# Patient Record
Sex: FEMALE | Race: WHITE | NOT HISPANIC OR LATINO | Employment: OTHER | URBAN - METROPOLITAN AREA
[De-identification: names, ages, dates, MRNs, and addresses within clinical notes are randomized per-mention and may not be internally consistent; named-entity substitution may affect disease eponyms.]

---

## 2017-02-02 ENCOUNTER — GENERIC CONVERSION - ENCOUNTER (OUTPATIENT)
Dept: OTHER | Facility: OTHER | Age: 77
End: 2017-02-02

## 2017-02-02 ENCOUNTER — TRANSCRIBE ORDERS (OUTPATIENT)
Dept: ADMINISTRATIVE | Facility: HOSPITAL | Age: 77
End: 2017-02-02

## 2017-02-02 DIAGNOSIS — R19.7 DIARRHEA, UNSPECIFIED TYPE: ICD-10-CM

## 2017-02-02 DIAGNOSIS — R10.13 ABDOMINAL PAIN, EPIGASTRIC: Primary | ICD-10-CM

## 2017-02-07 ENCOUNTER — TRANSCRIBE ORDERS (OUTPATIENT)
Dept: ADMINISTRATIVE | Facility: HOSPITAL | Age: 77
End: 2017-02-07

## 2017-02-07 ENCOUNTER — APPOINTMENT (OUTPATIENT)
Dept: LAB | Facility: HOSPITAL | Age: 77
End: 2017-02-07
Attending: INTERNAL MEDICINE
Payer: MEDICARE

## 2017-02-07 ENCOUNTER — APPOINTMENT (OUTPATIENT)
Dept: LAB | Facility: CLINIC | Age: 77
End: 2017-02-07
Payer: MEDICARE

## 2017-02-07 DIAGNOSIS — R10.32 ABDOMINAL PAIN, LEFT LOWER QUADRANT: Primary | ICD-10-CM

## 2017-02-07 DIAGNOSIS — R10.13 ABDOMINAL PAIN, EPIGASTRIC: ICD-10-CM

## 2017-02-07 DIAGNOSIS — R10.32 ABDOMINAL PAIN, LEFT LOWER QUADRANT: ICD-10-CM

## 2017-02-07 LAB
ALBUMIN SERPL BCP-MCNC: 3.6 G/DL (ref 3.5–5)
ALP SERPL-CCNC: 95 U/L (ref 46–116)
ALT SERPL W P-5'-P-CCNC: 31 U/L (ref 12–78)
AMYLASE SERPL-CCNC: 42 IU/L (ref 25–115)
ANION GAP SERPL CALCULATED.3IONS-SCNC: 10 MMOL/L (ref 4–13)
AST SERPL W P-5'-P-CCNC: 22 U/L (ref 5–45)
BILIRUB SERPL-MCNC: 0.6 MG/DL (ref 0.2–1)
BUN SERPL-MCNC: 13 MG/DL (ref 5–25)
CALCIUM SERPL-MCNC: 8.9 MG/DL (ref 8.3–10.1)
CHLORIDE SERPL-SCNC: 103 MMOL/L (ref 100–108)
CO2 SERPL-SCNC: 28 MMOL/L (ref 21–32)
CREAT SERPL-MCNC: 0.66 MG/DL (ref 0.6–1.3)
ERYTHROCYTE [DISTWIDTH] IN BLOOD BY AUTOMATED COUNT: 12.9 % (ref 11.6–15.1)
GFR SERPL CREATININE-BSD FRML MDRD: >60 ML/MIN/1.73SQ M
GLUCOSE SERPL-MCNC: 123 MG/DL (ref 65–140)
HCT VFR BLD AUTO: 40.2 % (ref 37–47)
HGB BLD-MCNC: 13.5 G/DL (ref 12–16)
LIPASE SERPL-CCNC: 183 U/L (ref 73–393)
MCH RBC QN AUTO: 29.8 PG (ref 27–31)
MCHC RBC AUTO-ENTMCNC: 33.7 G/DL (ref 31.4–37.4)
MCV RBC AUTO: 89 FL (ref 82–98)
PLATELET # BLD AUTO: 212 THOUSANDS/UL (ref 130–400)
PMV BLD AUTO: 9.5 FL (ref 8.9–12.7)
POTASSIUM SERPL-SCNC: 3.7 MMOL/L (ref 3.5–5.3)
PROT SERPL-MCNC: 7.2 G/DL (ref 6.4–8.2)
RBC # BLD AUTO: 4.54 MILLION/UL (ref 4.2–5.4)
SODIUM SERPL-SCNC: 141 MMOL/L (ref 136–145)
WBC # BLD AUTO: 7.6 THOUSAND/UL (ref 4.8–10.8)

## 2017-02-07 PROCEDURE — 85027 COMPLETE CBC AUTOMATED: CPT

## 2017-02-07 PROCEDURE — 80053 COMPREHEN METABOLIC PANEL: CPT | Performed by: INTERNAL MEDICINE

## 2017-02-07 PROCEDURE — 82150 ASSAY OF AMYLASE: CPT

## 2017-02-07 PROCEDURE — 83690 ASSAY OF LIPASE: CPT

## 2017-02-07 PROCEDURE — 36415 COLL VENOUS BLD VENIPUNCTURE: CPT

## 2017-02-13 ENCOUNTER — HOSPITAL ENCOUNTER (OUTPATIENT)
Dept: RADIOLOGY | Facility: HOSPITAL | Age: 77
Discharge: HOME | End: 2017-02-13
Attending: INTERNAL MEDICINE
Payer: MEDICARE

## 2017-02-13 DIAGNOSIS — R19.7 DIARRHEA, UNSPECIFIED TYPE: ICD-10-CM

## 2017-02-13 DIAGNOSIS — R10.13 ABDOMINAL PAIN, EPIGASTRIC: ICD-10-CM

## 2017-02-13 PROCEDURE — 74176 CT ABD & PELVIS W/O CONTRAST: CPT

## 2017-02-15 ENCOUNTER — ALLSCRIPTS OFFICE VISIT (OUTPATIENT)
Dept: OTHER | Facility: OTHER | Age: 77
End: 2017-02-15

## 2017-02-20 ENCOUNTER — ANESTHESIA EVENT (OUTPATIENT)
Dept: GASTROENTEROLOGY | Facility: AMBULARY SURGERY CENTER | Age: 77
End: 2017-02-20
Payer: MEDICARE

## 2017-02-20 RX ORDER — AMITRIPTYLINE HYDROCHLORIDE 25 MG/1
25 TABLET, FILM COATED ORAL
COMMUNITY
End: 2017-10-09

## 2017-02-21 ENCOUNTER — HOSPITAL ENCOUNTER (OUTPATIENT)
Facility: AMBULARY SURGERY CENTER | Age: 77
Setting detail: OUTPATIENT SURGERY
Discharge: HOME | End: 2017-02-21
Attending: INTERNAL MEDICINE | Admitting: INTERNAL MEDICINE
Payer: MEDICARE

## 2017-02-21 ENCOUNTER — ANESTHESIA (OUTPATIENT)
Dept: GASTROENTEROLOGY | Facility: AMBULARY SURGERY CENTER | Age: 77
End: 2017-02-21
Payer: MEDICARE

## 2017-02-21 VITALS
OXYGEN SATURATION: 100 % | HEART RATE: 69 BPM | TEMPERATURE: 98.7 F | SYSTOLIC BLOOD PRESSURE: 179 MMHG | RESPIRATION RATE: 18 BRPM | DIASTOLIC BLOOD PRESSURE: 77 MMHG

## 2017-02-21 DIAGNOSIS — R19.4 CHANGE IN BOWEL HABITS: ICD-10-CM

## 2017-02-21 DIAGNOSIS — Z86.010 HISTORY OF COLONIC POLYPS: ICD-10-CM

## 2017-02-21 DIAGNOSIS — R10.13 EPIGASTRIC PAIN: ICD-10-CM

## 2017-02-21 PROCEDURE — 88305 TISSUE EXAM BY PATHOLOGIST: CPT | Performed by: INTERNAL MEDICINE

## 2017-02-21 PROCEDURE — 88342 IMHCHEM/IMCYTCHM 1ST ANTB: CPT | Performed by: INTERNAL MEDICINE

## 2017-02-21 RX ORDER — FENTANYL CITRATE 50 UG/ML
INJECTION, SOLUTION INTRAMUSCULAR; INTRAVENOUS AS NEEDED
Status: DISCONTINUED | OUTPATIENT
Start: 2017-02-21 | End: 2017-02-21 | Stop reason: SURG

## 2017-02-21 RX ORDER — LABETALOL HYDROCHLORIDE 5 MG/ML
INJECTION, SOLUTION INTRAVENOUS AS NEEDED
Status: DISCONTINUED | OUTPATIENT
Start: 2017-02-21 | End: 2017-02-21 | Stop reason: SURG

## 2017-02-21 RX ORDER — PROPOFOL 10 MG/ML
INJECTION, EMULSION INTRAVENOUS AS NEEDED
Status: DISCONTINUED | OUTPATIENT
Start: 2017-02-21 | End: 2017-02-21 | Stop reason: SURG

## 2017-02-21 RX ORDER — SODIUM CHLORIDE, SODIUM LACTATE, POTASSIUM CHLORIDE, CALCIUM CHLORIDE 600; 310; 30; 20 MG/100ML; MG/100ML; MG/100ML; MG/100ML
125 INJECTION, SOLUTION INTRAVENOUS CONTINUOUS
Status: DISCONTINUED | OUTPATIENT
Start: 2017-02-21 | End: 2017-02-21 | Stop reason: HOSPADM

## 2017-02-21 RX ADMIN — PROPOFOL 50 MG: 10 INJECTION, EMULSION INTRAVENOUS at 11:55

## 2017-02-21 RX ADMIN — PROPOFOL 100 MG: 10 INJECTION, EMULSION INTRAVENOUS at 11:42

## 2017-02-21 RX ADMIN — LABETALOL HYDROCHLORIDE 5 MG: 5 INJECTION, SOLUTION INTRAVENOUS at 11:50

## 2017-02-21 RX ADMIN — PROPOFOL 50 MG: 10 INJECTION, EMULSION INTRAVENOUS at 11:59

## 2017-02-21 RX ADMIN — FENTANYL CITRATE 50 MCG: 50 INJECTION, SOLUTION INTRAMUSCULAR; INTRAVENOUS at 11:42

## 2017-02-21 RX ADMIN — PROPOFOL 50 MG: 10 INJECTION, EMULSION INTRAVENOUS at 12:10

## 2017-02-21 RX ADMIN — PROPOFOL 50 MG: 10 INJECTION, EMULSION INTRAVENOUS at 11:50

## 2017-02-21 RX ADMIN — SODIUM CHLORIDE, SODIUM LACTATE, POTASSIUM CHLORIDE, AND CALCIUM CHLORIDE 125 ML/HR: .6; .31; .03; .02 INJECTION, SOLUTION INTRAVENOUS at 11:27

## 2017-02-21 RX ADMIN — PROPOFOL 50 MG: 10 INJECTION, EMULSION INTRAVENOUS at 12:04

## 2017-02-21 RX ADMIN — FENTANYL CITRATE 50 MCG: 50 INJECTION, SOLUTION INTRAMUSCULAR; INTRAVENOUS at 11:50

## 2017-02-23 ENCOUNTER — HOSPITAL ENCOUNTER (EMERGENCY)
Facility: HOSPITAL | Age: 77
Discharge: HOME | End: 2017-02-23
Admitting: EMERGENCY MEDICINE
Payer: MEDICARE

## 2017-02-23 ENCOUNTER — APPOINTMENT (EMERGENCY)
Dept: RADIOLOGY | Facility: HOSPITAL | Age: 77
End: 2017-02-23
Payer: MEDICARE

## 2017-02-23 VITALS
WEIGHT: 190 LBS | HEART RATE: 90 BPM | TEMPERATURE: 98.4 F | SYSTOLIC BLOOD PRESSURE: 145 MMHG | OXYGEN SATURATION: 96 % | BODY MASS INDEX: 34.96 KG/M2 | DIASTOLIC BLOOD PRESSURE: 73 MMHG | HEIGHT: 62 IN | RESPIRATION RATE: 20 BRPM

## 2017-02-23 DIAGNOSIS — S22.42XA FRACTURE OF MULTIPLE RIBS OF LEFT SIDE, INITIAL ENCOUNTER: Primary | ICD-10-CM

## 2017-02-23 PROCEDURE — 99284 EMERGENCY DEPT VISIT MOD MDM: CPT

## 2017-02-23 PROCEDURE — 73030 X-RAY EXAM OF SHOULDER: CPT

## 2017-02-23 RX ORDER — OXYCODONE HYDROCHLORIDE AND ACETAMINOPHEN 5; 325 MG/1; MG/1
1 TABLET ORAL ONCE
Status: COMPLETED | OUTPATIENT
Start: 2017-02-23 | End: 2017-02-23

## 2017-02-23 RX ORDER — OXYCODONE HYDROCHLORIDE AND ACETAMINOPHEN 5; 325 MG/1; MG/1
1 TABLET ORAL EVERY 4 HOURS PRN
Qty: 12 TABLET | Refills: 0 | Status: SHIPPED | OUTPATIENT
Start: 2017-02-23 | End: 2017-02-25

## 2017-02-23 RX ADMIN — OXYCODONE HYDROCHLORIDE AND ACETAMINOPHEN 1 TABLET: 5; 325 TABLET ORAL at 09:39

## 2017-03-06 ENCOUNTER — OFFICE VISIT (OUTPATIENT)
Dept: NUTRITION | Facility: HOSPITAL | Age: 77
End: 2017-03-06
Payer: MEDICARE

## 2017-03-06 DIAGNOSIS — E11.42 DIABETIC POLYNEUROPATHY ASSOCIATED WITH TYPE 2 DIABETES MELLITUS (HCC): ICD-10-CM

## 2017-03-06 DIAGNOSIS — E11.9 DIABETES MELLITUS WITHOUT COMPLICATION (HCC): ICD-10-CM

## 2017-03-06 PROCEDURE — 97802 MEDICAL NUTRITION INDIV IN: CPT

## 2017-03-17 ENCOUNTER — GENERIC CONVERSION - ENCOUNTER (OUTPATIENT)
Dept: OTHER | Facility: OTHER | Age: 77
End: 2017-03-17

## 2017-03-20 ENCOUNTER — ALLSCRIPTS OFFICE VISIT (OUTPATIENT)
Dept: OTHER | Facility: OTHER | Age: 77
End: 2017-03-20

## 2017-03-29 ENCOUNTER — ALLSCRIPTS OFFICE VISIT (OUTPATIENT)
Dept: OTHER | Facility: OTHER | Age: 77
End: 2017-03-29

## 2017-04-11 ENCOUNTER — GENERIC CONVERSION - ENCOUNTER (OUTPATIENT)
Dept: OTHER | Facility: OTHER | Age: 77
End: 2017-04-11

## 2017-04-11 LAB
LEFT EYE DIABETIC RETINOPATHY: NORMAL
RIGHT EYE DIABETIC RETINOPATHY: NORMAL

## 2017-04-14 ENCOUNTER — GENERIC CONVERSION - ENCOUNTER (OUTPATIENT)
Dept: OTHER | Facility: OTHER | Age: 77
End: 2017-04-14

## 2017-05-05 ENCOUNTER — ALLSCRIPTS OFFICE VISIT (OUTPATIENT)
Dept: OTHER | Facility: OTHER | Age: 77
End: 2017-05-05

## 2017-05-05 DIAGNOSIS — M25.512 PAIN IN LEFT SHOULDER: ICD-10-CM

## 2017-05-10 ENCOUNTER — ALLSCRIPTS OFFICE VISIT (OUTPATIENT)
Dept: OTHER | Facility: OTHER | Age: 77
End: 2017-05-10

## 2017-06-09 ENCOUNTER — ALLSCRIPTS OFFICE VISIT (OUTPATIENT)
Dept: OTHER | Facility: OTHER | Age: 77
End: 2017-06-09

## 2017-06-25 ENCOUNTER — APPOINTMENT (INPATIENT)
Dept: RADIOLOGY | Facility: HOSPITAL | Age: 77
DRG: 605 | End: 2017-06-25
Payer: MEDICARE

## 2017-06-25 ENCOUNTER — HOSPITAL ENCOUNTER (INPATIENT)
Facility: HOSPITAL | Age: 77
LOS: 2 days | Discharge: HOME HEALTH | DRG: 605 | End: 2017-06-27
Attending: FAMILY MEDICINE | Admitting: FAMILY MEDICINE
Payer: MEDICARE

## 2017-06-25 ENCOUNTER — APPOINTMENT (EMERGENCY)
Dept: RADIOLOGY | Facility: HOSPITAL | Age: 77
DRG: 605 | End: 2017-06-25
Payer: MEDICARE

## 2017-06-25 DIAGNOSIS — M54.9 BACK PAIN: ICD-10-CM

## 2017-06-25 DIAGNOSIS — R29.6 FREQUENT FALLS: ICD-10-CM

## 2017-06-25 DIAGNOSIS — R07.9 CHEST PAIN: Primary | ICD-10-CM

## 2017-06-25 DIAGNOSIS — R26.89 DECREASED MOBILITY: ICD-10-CM

## 2017-06-25 DIAGNOSIS — M25.562 LEFT KNEE PAIN: ICD-10-CM

## 2017-06-25 DIAGNOSIS — M79.7 FIBROMYALGIA: ICD-10-CM

## 2017-06-25 LAB
ALBUMIN SERPL BCP-MCNC: 3.5 G/DL (ref 3.5–5)
ALP SERPL-CCNC: 91 U/L (ref 46–116)
ALT SERPL W P-5'-P-CCNC: 38 U/L (ref 12–78)
ANION GAP SERPL CALCULATED.3IONS-SCNC: 10 MMOL/L (ref 4–13)
APTT PPP: 24 SECONDS (ref 24–33)
AST SERPL W P-5'-P-CCNC: 26 U/L (ref 5–45)
BASOPHILS # BLD AUTO: 0.1 THOUSANDS/ΜL (ref 0–0.1)
BASOPHILS NFR BLD AUTO: 1 % (ref 0–1)
BILIRUB SERPL-MCNC: 0.5 MG/DL (ref 0.2–1)
BUN SERPL-MCNC: 16 MG/DL (ref 5–25)
CALCIUM SERPL-MCNC: 10 MG/DL (ref 8.3–10.1)
CHLORIDE SERPL-SCNC: 105 MMOL/L (ref 100–108)
CO2 SERPL-SCNC: 25 MMOL/L (ref 21–32)
CREAT SERPL-MCNC: 0.59 MG/DL (ref 0.6–1.3)
EOSINOPHIL # BLD AUTO: 0.3 THOUSAND/ΜL (ref 0–0.61)
EOSINOPHIL NFR BLD AUTO: 2 % (ref 0–6)
ERYTHROCYTE [DISTWIDTH] IN BLOOD BY AUTOMATED COUNT: 13.4 % (ref 11.6–15.1)
GFR SERPL CREATININE-BSD FRML MDRD: >60 ML/MIN/1.73SQ M
GLUCOSE SERPL-MCNC: 76 MG/DL (ref 65–140)
HCT VFR BLD AUTO: 40.8 % (ref 37–47)
HGB BLD-MCNC: 14 G/DL (ref 12–16)
INR PPP: 1.01 (ref 0.86–1.16)
LYMPHOCYTES # BLD AUTO: 2.9 THOUSANDS/ΜL (ref 0.6–4.47)
LYMPHOCYTES NFR BLD AUTO: 26 % (ref 14–44)
MCH RBC QN AUTO: 30.9 PG (ref 27–31)
MCHC RBC AUTO-ENTMCNC: 34.3 G/DL (ref 31.4–37.4)
MCV RBC AUTO: 90 FL (ref 82–98)
MONOCYTES # BLD AUTO: 0.8 THOUSAND/ΜL (ref 0.17–1.22)
MONOCYTES NFR BLD AUTO: 7 % (ref 4–12)
NEUTROPHILS # BLD AUTO: 7.2 THOUSANDS/ΜL (ref 1.85–7.62)
NEUTS SEG NFR BLD AUTO: 64 % (ref 43–75)
NRBC BLD AUTO-RTO: 0 /100 WBCS
PLATELET # BLD AUTO: 191 THOUSANDS/UL (ref 130–400)
PMV BLD AUTO: 9.5 FL (ref 8.9–12.7)
POTASSIUM SERPL-SCNC: 4.3 MMOL/L (ref 3.5–5.3)
PROT SERPL-MCNC: 7.6 G/DL (ref 6.4–8.2)
PROTHROMBIN TIME: 10.6 SECONDS (ref 9.4–11.7)
RBC # BLD AUTO: 4.53 MILLION/UL (ref 4.2–5.4)
SODIUM SERPL-SCNC: 140 MMOL/L (ref 136–145)
TROPONIN I SERPL-MCNC: <0.02 NG/ML
WBC # BLD AUTO: 11.2 THOUSAND/UL (ref 4.8–10.8)

## 2017-06-25 PROCEDURE — 85610 PROTHROMBIN TIME: CPT | Performed by: PHYSICIAN ASSISTANT

## 2017-06-25 PROCEDURE — 99285 EMERGENCY DEPT VISIT HI MDM: CPT

## 2017-06-25 PROCEDURE — 84484 ASSAY OF TROPONIN QUANT: CPT | Performed by: PHYSICIAN ASSISTANT

## 2017-06-25 PROCEDURE — 93005 ELECTROCARDIOGRAM TRACING: CPT | Performed by: PHYSICIAN ASSISTANT

## 2017-06-25 PROCEDURE — 73523 X-RAY EXAM HIPS BI 5/> VIEWS: CPT

## 2017-06-25 PROCEDURE — 71020 HB CHEST X-RAY 2VW FRONTAL&LATL: CPT

## 2017-06-25 PROCEDURE — 85025 COMPLETE CBC W/AUTO DIFF WBC: CPT | Performed by: PHYSICIAN ASSISTANT

## 2017-06-25 PROCEDURE — 73564 X-RAY EXAM KNEE 4 OR MORE: CPT

## 2017-06-25 PROCEDURE — 80053 COMPREHEN METABOLIC PANEL: CPT | Performed by: PHYSICIAN ASSISTANT

## 2017-06-25 PROCEDURE — 36415 COLL VENOUS BLD VENIPUNCTURE: CPT | Performed by: PHYSICIAN ASSISTANT

## 2017-06-25 PROCEDURE — 85730 THROMBOPLASTIN TIME PARTIAL: CPT | Performed by: PHYSICIAN ASSISTANT

## 2017-06-25 RX ORDER — OXYCODONE HYDROCHLORIDE AND ACETAMINOPHEN 5; 325 MG/1; MG/1
1 TABLET ORAL EVERY 4 HOURS PRN
Status: DISCONTINUED | OUTPATIENT
Start: 2017-06-25 | End: 2017-06-27 | Stop reason: HOSPADM

## 2017-06-25 RX ORDER — OXYCODONE HYDROCHLORIDE AND ACETAMINOPHEN 325; 5 MG/5ML; MG/5ML
SOLUTION ORAL EVERY 4 HOURS PRN
Status: ON HOLD | COMMUNITY
End: 2017-06-26 | Stop reason: CLARIF

## 2017-06-25 RX ORDER — PREGABALIN 75 MG/1
75 CAPSULE ORAL 2 TIMES DAILY
COMMUNITY
End: 2017-10-09

## 2017-06-25 RX ORDER — METOPROLOL SUCCINATE 50 MG/1
50 TABLET, EXTENDED RELEASE ORAL EVERY MORNING
Status: DISCONTINUED | OUTPATIENT
Start: 2017-06-26 | End: 2017-06-27 | Stop reason: HOSPADM

## 2017-06-25 RX ORDER — ATORVASTATIN CALCIUM 40 MG/1
40 TABLET, FILM COATED ORAL
Status: DISCONTINUED | OUTPATIENT
Start: 2017-06-26 | End: 2017-06-27 | Stop reason: HOSPADM

## 2017-06-25 RX ADMIN — SODIUM CHLORIDE 1000 ML: 0.9 INJECTION, SOLUTION INTRAVENOUS at 18:58

## 2017-06-25 NOTE — H&P
H&P Exam - Michal Sacks 65 y o  female MRN: [id number]    Unit/Bed#: ED 05 Encounter: [id number]    Assessment/Plan: This is a 65 y o  F with a PMHx of HTN,HLD,DM who presents for chest pain and L  Knee pain s/p fall  Patient is expected to stay for atleast 2 midnights, with an expected length of stay of 3 or more days  Patient will be placed under the service of Dr Harry Rater  The following assessment and plan was discussed with attending and the Senior Resident  Chest pain: Risk factors for ACS include HTN, DM, HLD  Patient does have a history of rib injury, pain can also be due to this  Need to rule out ACS, first troponin negative, trend troponins, place on telemetry, cardio consult; nuclear stress test in AM  Acute on chronic left knee pain s/p fall: Fall precautions, XR of hip and L  knee negative, order PT/OT, morphine 1mg q4h PRN for severe pain, home percocet for moderate pain, Consult ortho for further eval 2/2 severe worsening of functionality 2/2 the acute pain with no fractures seen on XR  Hx of L  Sided rib fracture: order CXR to assess this, ordered incentive spirometry to prevent atelectasis  HTN: Initial BP elevated on admission - possibly to pain; repeat BP improved  Continue home metoprolol, monitor vital signs, IV Lopressor PRN for SBP >180  HLD: Continue Crestor, last lipid panel was in November 2016, LDL 80, rest of the labs are unremarkable, will reorder lipid panel to reassess status of cholesterol  NIDDM: Will hold metformin, place patient on insulin sliding scale with regular Accu-Cheks, last hemoglobin A1c was 7 3 in November 2016, will reorder another A1c  Chronic pain 2/2 Fibromyalgia: Continue her home Percocet  IBS: pt reports hx of IBS, CT abd in Feb 2017 shows diverticulosis  Monitor for diarrhea or constipation, will treat as indicated  No recent exacerbation of chronic abdominal pain    Chronic urinary incontinence: supportive care  DVT prophylaxis: SCDs, SQL  Global: HL, Cardio diet, NPO after midnight for stress test in AM    History of Present Illness    66 yo f with PMH of fibromyalgia, IBS, HTN, HLD who presents today with L  Knee pain x3 days and L  Sided CP  Pt fell 3 days ago, as she was walking - reported that her knees gave away, and she ended up landing on her buttocks with her legs pinned under her, and since then she has had pain in her  L  Knee pain, can't stand on it, can only walk with a walker - started to use a walker after the fall  No LOC, no head injury  Reports she has been falling since this year, she has had 2 falls this year  Pt also reports that she has had increasing weakness in her RLE x1-2 months, reason for the fall  L  Sided chest pain: intermittent episodes x 3-4 months, sharp pain, 10/10, episodes last 2 minutes,  happens often (pt unable to quantify frequency), triggered regardless of activity, nothing makes it better or worse, self resolves, not tried anything for the pain  No hx of MI, never seen a cardiologist, no cardiac hx  No associated SOB or N/V  Review of Systems   Constitutional: Negative for chills and fever  Pt reports "all kinds of pains since I was 16 since I have had fibromyalgia"   HENT: Negative for rhinorrhea and sore throat  Respiratory: Negative for cough, shortness of breath and wheezing  Cardiovascular: Positive for chest pain  Negative for palpitations and leg swelling  Gastrointestinal: Positive for abdominal pain  Negative for blood in stool, nausea and vomiting  Chronic diffuse abdominal pain since 6-7 months with no recent exacerbation, seen Dr Irving Cortez in past, pt reports 2/2 IBS, last coloscopy showed polyps, also has chronic diarrhea associated with this with no recent acute exacerbations   Genitourinary: Negative for dysuria  Neurological: Negative for light-headedness and headaches         Historical Information   Past Medical History:   Diagnosis Date    Arthritis     DJD both knees    Diabetes mellitus     Fall with injury 02/15/2017    fell at home secondary to macular degeneration vision issues, neck pain currently    Fibromyalgia     had for many years  may have polymyalgia    Glaucoma     both eyes    H/O fibromyalgia     Herniated cervical disc     Hyperlipidemia     Hypertension     Liver disease     " fatty liver"    Lumbar herniated disc     with sciatica    Macular degeneration     both eyes, gets injections    Neuropathy     in the feet? falls on occ     Past Surgical History:   Procedure Laterality Date    APPENDECTOMY      CHOLECYSTECTOMY      Lap    COLONOSCOPY      COLONOSCOPY N/A 2/21/2017    Procedure: COLONOSCOPY and biopsy, snare polypectomy;  Surgeon: Connie Camacho MD;  Location: [facility] GI LAB; Service:     DILATION AND CURETTAGE, DIAGNOSTIC / THERAPEUTIC      ESOPHAGOGASTRODUODENOSCOPY N/A 2/21/2017    Procedure: ESOPHAGOGASTRODUODENOSCOPY (EGD) and biopsy ;  Surgeon: Connie Camacho MD;  Location: [facility] GI LAB; Service:     HEMORRHOID SURGERY      in her 18's    HYSTERECTOMY      complete    OOPHORECTOMY Left     age 21 then hysterectomy    OH REMV CATARACT EXTRACAP,INSERT LENS Right 5/19/2016    Procedure: EXTRACTION EXTRACAPSULAR CATARACT PHACO INTRAOCULAR LENS (IOL);   Surgeon: Laurie Iniguez MD;  Location: [facility] MAIN OR;  Service: Ophthalmology    TONSILLECTOMY      age 24     Social History   History   Alcohol Use    Yes     Comment: occ     History   Drug Use No     History   Smoking Status    Never Smoker   Smokeless Tobacco    Never Used     Family History:   Family History   Problem Relation Age of Onset    Coronary artery disease Father     Cancer Father      esophagus, trach    Peripheral vascular disease Father     Heart disease Sister      MI, in her 62s    Cancer Brother      bowel CA exp age 52    Cancer Maternal Grandmother      bowel CA       Meds/Allergies   PTA meds:   Prior to Admission Medications   Prescriptions Last Dose Informant Patient Reported? Taking? Multiple Vitamins-Minerals (PRESERVISION AREDS 2 PO) 6/25/2017  Yes No   Sig: Take by mouth 2 (two) times a day  Omega-3 Fatty Acids (FISH OIL PO) 6/25/2017  Yes No   Sig: Take 1,000 mg by mouth 2 (two) times a day  amitriptyline (ELAVIL) 25 mg tablet Past Month at Unknown time  Yes Yes   Sig: Take 25 mg by mouth daily at bedtime   ascorbic acid (VITAMIN C) 500 mg tablet 6/25/2017  Yes No   Sig: Take 500 mg by mouth 2 (two) times a day  bimatoprost (LUMIGAN) 0 01 % ophthalmic drops 6/24/2017  Yes No   Sig: Administer 1 drop to the right eye daily at bedtime  metFORMIN (GLUCOPHAGE) 850 mg tablet 6/25/2017  Yes No   Sig: Take 850 mg by mouth 2 (two) times a day with meals  metoprolol succinate (TOPROL-XL) 50 mg 24 hr tablet 6/25/2017  Yes No   Sig: Take 50 mg by mouth every morning  oxyCODONE-acetaminophen (ROXICET) 5-325 mg/5 mL solution 6/25/2017  Yes Yes   Sig: Take by mouth every 4 (four) hours as needed for moderate pain   pregabalin (LYRICA) 75 mg capsule 6/23/2017  Yes Yes   Sig: Take 75 mg by mouth 2 (two) times a day   rosuvastatin (CRESTOR) 20 MG tablet 6/24/2017  Yes No   Sig: Take 20 mg by mouth daily at bedtime        Facility-Administered Medications: None     Allergies   Allergen Reactions    Other Hives     Cat Scan Dye       Objective   First Vitals:   Blood Pressure: (!) 185/100 (06/25/17 1653)  Pulse: 80 (06/25/17 1653)  Temperature: 98 2 °F (36 8 °C) (06/25/17 1653)  Respirations: 18 (06/25/17 1653)  Weight - Scale: 86 2 kg (190 lb) (06/25/17 1653)  SpO2: 97 % (06/25/17 1653)    Current Vitals:   Blood Pressure: (!) 195/77 (06/25/17 1901)  Pulse: 80 (06/25/17 1901)  Temperature: 98 2 °F (36 8 °C) (06/25/17 1653)  Respirations: 18 (06/25/17 1901)  Weight - Scale: 86 2 kg (190 lb) (06/25/17 1653)  SpO2: 98 % (06/25/17 1901)    No intake or output data in the 24 hours ending 06/25/17 1911    Invasive Devices     Peripheral Intravenous Line Peripheral IV 06/25/17 Left Hand less than 1 day                Physical Exam   Constitutional: She is oriented to person, place, and time  She appears well-developed and well-nourished  No distress  HENT:   Head: Normocephalic and atraumatic  Right Ear: External ear normal    Left Ear: External ear normal    Cardiovascular: Normal rate, regular rhythm, normal heart sounds and intact distal pulses  No murmur heard  Pulmonary/Chest: Effort normal and breath sounds normal  No respiratory distress  She has no wheezes  She has no rales  She exhibits tenderness  Tender to palpation of L  Lateral lower costal area on ribs ~5-8   Abdominal: Soft  Bowel sounds are normal  She exhibits no distension  Mildly tender diffusely, more so in lower abdomen    Musculoskeletal: She exhibits no edema, tenderness or deformity  No erythema, or swelling noted on L  Knee; ROM in LLE limited & slow 2/2 pain - pt unable to completely flex L  Knee; R  Knee ROM intact    Neurological: She is alert and oriented to person, place, and time  No cranial nerve deficit  She exhibits normal muscle tone  Motor strength 4/5 but equal b/l   Skin: Skin is warm and dry  She is not diaphoretic  Psychiatric: She has a normal mood and affect         Lab Results:   Results for orders placed or performed during the hospital encounter of 06/25/17   CBC and differential   Result Value Ref Range    WBC 11 20 (H) 4 80 - 10 80 Thousand/uL    RBC 4 53 4 20 - 5 40 Million/uL    Hemoglobin 14 0 12 0 - 16 0 g/dL    Hematocrit 40 8 37 0 - 47 0 %    MCV 90 82 - 98 fL    MCH 30 9 27 0 - 31 0 pg    MCHC 34 3 31 4 - 37 4 g/dL    RDW 13 4 11 6 - 15 1 %    MPV 9 5 8 9 - 12 7 fL    Platelets 965 496 - 470 Thousands/uL    nRBC 0 /100 WBCs    Neutrophils Relative 64 43 - 75 %    Lymphocytes Relative 26 14 - 44 %    Monocytes Relative 7 4 - 12 %    Eosinophils Relative 2 0 - 6 %    Basophils Relative 1 0 - 1 %    Neutrophils Absolute 7 20 1 85 - 7 62 Thousands/µL    Lymphocytes Absolute 2 90 0 60 - 4 47 Thousands/µL    Monocytes Absolute 0 80 0 17 - 1 22 Thousand/µL    Eosinophils Absolute 0 30 0 00 - 0 61 Thousand/µL    Basophils Absolute 0 10 0 00 - 0 10 Thousands/µL   Protime-INR   Result Value Ref Range    Protime 10 6 9 4 - 11 7 seconds    INR 1 01 0 86 - 1 16   APTT   Result Value Ref Range    PTT 24 24 - 33 seconds   Comprehensive metabolic panel   Result Value Ref Range    Sodium 140 136 - 145 mmol/L    Potassium 4 3 3 5 - 5 3 mmol/L    Chloride 105 100 - 108 mmol/L    CO2 25 21 - 32 mmol/L    Anion Gap 10 4 - 13 mmol/L    BUN 16 5 - 25 mg/dL    Creatinine 0 59 (L) 0 60 - 1 30 mg/dL    Glucose 76 65 - 140 mg/dL    Calcium 10 0 8 3 - 10 1 mg/dL    AST 26 5 - 45 U/L    ALT 38 12 - 78 U/L    Alkaline Phosphatase 91 46 - 116 U/L    Total Protein 7 6 6 4 - 8 2 g/dL    Albumin 3 5 3 5 - 5 0 g/dL    Total Bilirubin 0 50 0 20 - 1 00 mg/dL    eGFR >60 0 ml/min/1 73sq m   Troponin I   Result Value Ref Range    Troponin I <0 02 <=0 04 ng/mL       Imaging: Xr Knee 4+ Views Left Injury    Result Date: 6/25/2017  Impression: No acute osseous abnormality  Workstation performed: [id number]     Xr Hips Bilateral 5+ W Pelvis If Performed    Result Date: 6/25/2017  Impression: No evidence of acute traumatic injury to the pelvis and bilateral hips  There is mild osteoarthritis of both hips  Workstation performed: [id number]     EKG, Pathology, and Other Studies: I have personally reviewed all pertinent reports      Code Status: DNAR  Advance Directive and Living Will: Yes      Counseling / Coordination of Care: Total floor / unit time spent today 30 minutes

## 2017-06-25 NOTE — ED PROVIDER NOTES
History  Chief Complaint   Patient presents with    Fall     pt has back problems and legs give out  a lot,states she fell 2 days ago when she did not lift her foot high enough,c/o left arm pain,having headache today     Patient is a 68-year-old female, h/o macular degeneration, fibromyalgia, HTN, HLD, presenting today with acute on chronic left knee pain x 3 days after she had a fall when both knees gave out on her and then fell onto her buttock with her legs underneath her  She has been having multiple falls more frequently over the past year  She is now using a walker at home and is able to ambulate with her son who helps, however lives by herself  Took a percocet before coming to the ED  Goes on to mention that she has been having chest pain on and off over the past few months that comes while at rest and while ambulating, without specific exacerbation factors  It is sharp and short lived and occurs on a daily basis  This pain began after she broke left side ribs back in February  Not actively having chest pain  Denies numbness, parethesias, weakness, nausea, vomiting, diaphoresis, shortness of breath  Differential includes but is not limited to fracture, sprain, effusion, ACS, PE  Prior to Admission Medications   Prescriptions Last Dose Informant Patient Reported? Taking? Multiple Vitamins-Minerals (PRESERVISION AREDS 2 PO) 6/25/2017  Yes No   Sig: Take by mouth 2 (two) times a day  Omega-3 Fatty Acids (FISH OIL PO) 6/25/2017  Yes No   Sig: Take 1,000 mg by mouth 2 (two) times a day  amitriptyline (ELAVIL) 25 mg tablet Past Month at Unknown time  Yes Yes   Sig: Take 25 mg by mouth daily at bedtime   ascorbic acid (VITAMIN C) 500 mg tablet 6/25/2017  Yes No   Sig: Take 500 mg by mouth 2 (two) times a day  bimatoprost (LUMIGAN) 0 01 % ophthalmic drops 6/24/2017  Yes No   Sig: Administer 1 drop to the right eye daily at bedtime     metFORMIN (GLUCOPHAGE) 850 mg tablet 6/25/2017  Yes No   Sig: Take 850 mg by mouth 2 (two) times a day with meals  metoprolol succinate (TOPROL-XL) 50 mg 24 hr tablet 6/25/2017  Yes No   Sig: Take 50 mg by mouth every morning  oxyCODONE-acetaminophen (ROXICET) 5-325 mg/5 mL solution 6/25/2017  Yes Yes   Sig: Take by mouth every 4 (four) hours as needed for moderate pain   pregabalin (LYRICA) 75 mg capsule 6/23/2017  Yes Yes   Sig: Take 75 mg by mouth 2 (two) times a day   rosuvastatin (CRESTOR) 20 MG tablet 6/24/2017  Yes No   Sig: Take 20 mg by mouth daily at bedtime  Facility-Administered Medications: None       Past Medical History:   Diagnosis Date    Arthritis     DJD both knees    Diabetes mellitus     Fall with injury 02/15/2017    fell at home secondary to macular degeneration vision issues, neck pain currently    Fibromyalgia     had for many years  may have polymyalgia    Glaucoma     both eyes    H/O fibromyalgia     Herniated cervical disc     Hyperlipidemia     Hypertension     Liver disease     " fatty liver"    Lumbar herniated disc     with sciatica    Macular degeneration     both eyes, gets injections    Neuropathy     in the feet? falls on occ       Past Surgical History:   Procedure Laterality Date    APPENDECTOMY      CHOLECYSTECTOMY      Lap    COLONOSCOPY      COLONOSCOPY N/A 2/21/2017    Procedure: COLONOSCOPY and biopsy, snare polypectomy;  Surgeon: Isa Todd MD;  Location: [facility] GI LAB; Service:     DILATION AND CURETTAGE, DIAGNOSTIC / THERAPEUTIC      ESOPHAGOGASTRODUODENOSCOPY N/A 2/21/2017    Procedure: ESOPHAGOGASTRODUODENOSCOPY (EGD) and biopsy ;  Surgeon: Isa Todd MD;  Location: [facility] GI LAB; Service:     HEMORRHOID SURGERY      in her 18's    HYSTERECTOMY      complete    OOPHORECTOMY Left     age 21 then hysterectomy    [place] REMV CATARACT EXTRACAP,INSERT LENS Right 5/19/2016    Procedure: EXTRACTION EXTRACAPSULAR CATARACT PHACO INTRAOCULAR LENS (IOL);   Surgeon: Kate Pérez MD;  Location: [facility] MAIN OR;  Service: Ophthalmology    TONSILLECTOMY      age 24       Family History   Problem Relation Age of Onset    Coronary artery disease Father     Cancer Father      esophagus, trach    Peripheral vascular disease Father     Heart disease Sister      MI, in her 62s    Cancer Brother      bowel CA exp age 52    Cancer Maternal Grandmother      bowel CA     I have reviewed and agree with the history as documented  Social History   Substance Use Topics    Smoking status: Never Smoker    Smokeless tobacco: Never Used    Alcohol use Yes      Comment: occ        Review of Systems   Constitutional: Negative  Negative for activity change and appetite change  Eyes: Negative  Respiratory: Negative  Cardiovascular: Positive for chest pain  Left rib pain    Gastrointestinal: Negative  Negative for abdominal distention and abdominal pain  Genitourinary: Negative  Musculoskeletal: Positive for arthralgias and back pain  Negative for gait problem, joint swelling, myalgias, neck pain and neck stiffness  Skin: Negative  Negative for color change, pallor, rash and wound  Neurological: Positive for headaches  Negative for dizziness, tremors, seizures, syncope, facial asymmetry, speech difficulty, light-headedness and numbness  All other systems reviewed and are negative  Physical Exam  ED Triage Vitals   Temperature Pulse Respirations Blood Pressure SpO2   06/25/17 1653 06/25/17 1653 06/25/17 1653 06/25/17 1653 06/25/17 1653   98 2 °F (36 8 °C) 80 18 (!) 185/100 97 %      Temp src Heart Rate Source Patient Position BP Location FiO2 (%)   -- 06/25/17 1901 06/25/17 1653 06/25/17 1653 --    Monitor Sitting Left arm       Pain Score       06/25/17 1653       6           Physical Exam   Constitutional: She is oriented to person, place, and time  She appears well-developed and well-nourished  HENT:   Head: Normocephalic and atraumatic     Right Ear: External ear normal    Left Ear: External ear normal    Nose: Nose normal    Mouth/Throat: Oropharynx is clear and moist    Eyes: Conjunctivae and EOM are normal  Pupils are equal, round, and reactive to light  Neck: Normal range of motion  Cardiovascular: Normal rate, regular rhythm, normal heart sounds and intact distal pulses  Pulmonary/Chest: Effort normal and breath sounds normal  No respiratory distress  She has no wheezes  She has no rales  She exhibits no tenderness  spo2 is 97% within normal limits resting comfortably in no apparent distress    Abdominal: Soft  Bowel sounds are normal    Musculoskeletal:        Legs:  Neurological: She is alert and oriented to person, place, and time  Neurovascular exam intact  Strength 5/5    Skin: Skin is warm and dry  Capillary refill takes less than 2 seconds  Nursing note and vitals reviewed        ED Medications  Medications   sodium chloride 0 9 % bolus 1,000 mL (1,000 mL Intravenous New Bag 6/25/17 1148)       Diagnostic Studies  Labs Reviewed   CBC AND DIFFERENTIAL - Abnormal        Result Value Ref Range Status    WBC 11 20 (*) 4 80 - 10 80 Thousand/uL Final    RBC 4 53  4 20 - 5 40 Million/uL Final    Hemoglobin 14 0  12 0 - 16 0 g/dL Final    Hematocrit 40 8  37 0 - 47 0 % Final    MCV 90  82 - 98 fL Final    MCH 30 9  27 0 - 31 0 pg Final    MCHC 34 3  31 4 - 37 4 g/dL Final    RDW 13 4  11 6 - 15 1 % Final    MPV 9 5  8 9 - 12 7 fL Final    Platelets 069  711 - 400 Thousands/uL Final    nRBC 0  /100 WBCs Final    Neutrophils Relative 64  43 - 75 % Final    Lymphocytes Relative 26  14 - 44 % Final    Monocytes Relative 7  4 - 12 % Final    Eosinophils Relative 2  0 - 6 % Final    Basophils Relative 1  0 - 1 % Final    Neutrophils Absolute 7 20  1 85 - 7 62 Thousands/µL Final    Lymphocytes Absolute 2 90  0 60 - 4 47 Thousands/µL Final    Monocytes Absolute 0 80  0 17 - 1 22 Thousand/µL Final    Eosinophils Absolute 0 30  0 00 - 0 61 Thousand/µL Final    Basophils Absolute 0 10 0 00 - 0 10 Thousands/µL Final   COMPREHENSIVE METABOLIC PANEL - Abnormal     Creatinine 0 59 (*) 0 60 - 1 30 mg/dL Final    Comment: Standardized to IDMS reference method    Sodium 140  136 - 145 mmol/L Final    Potassium 4 3  3 5 - 5 3 mmol/L Final    Comment: Slightly Hemolyzed; Results May be Affected    Chloride 105  100 - 108 mmol/L Final    CO2 25  21 - 32 mmol/L Final    Anion Gap 10  4 - 13 mmol/L Final    BUN 16  5 - 25 mg/dL Final    Glucose 76  65 - 140 mg/dL Final    Comment: If the patient is fasting, the ADA then defines impaired fasting glucose as > 100 mg/dL and diabetes as > or equal to 123 mg/dL  Calcium 10 0  8 3 - 10 1 mg/dL Final    AST 26  5 - 45 U/L Final    Comment: Slightly Hemolyzed; Results May be Affected    ALT 38  12 - 78 U/L Final    Alkaline Phosphatase 91  46 - 116 U/L Final    Total Protein 7 6  6 4 - 8 2 g/dL Final    Albumin 3 5  3 5 - 5 0 g/dL Final    Total Bilirubin 0 50  0 20 - 1 00 mg/dL Final    eGFR >60 0  ml/min/1 73sq m Final    Narrative:     National Kidney Disease Education Program recommendations are as follows:  GFR calculation is accurate only with a steady state creatinine  Chronic Kidney disease less than 60 ml/min/1 73 sq  meters  Kidney failure less than 15 ml/min/1 73 sq  meters  PROTIME-INR - Normal    Protime 10 6  9 4 - 11 7 seconds Final    INR 1 01  0 86 - 1 16 Final   APTT - Normal    PTT 24  24 - 33 seconds Final    Narrative: Therapeutic Heparin Range = 60-90 seconds   TROPONIN I - Normal    Troponin I <0 02  <=0 04 ng/mL Final    Comment: 3Autovalidation override    Narrative:     [employer] Chemistry analyzer 99% cutoff is > 0 04 ng/mL in network labs    o cTnI 99% cutoff is useful only when applied to patients in the clinical setting of myocardial ischemia  o cTnI 99% cutoff should be interpreted in the context of clinical history, ECG findings and possibly cardiac imaging to establish correct diagnosis    o cTnI 99% cutoff may be suggestive but clearly not indicative of a coronary event without the clinical setting of myocardial ischemia  RED TOP       XR knee 4+ views left injury    (Results Pending)   XR hips bilateral 5+ w pelvis if performed    (Results Pending)       Procedures  ECG 12 Lead Documentation  Date/Time: 6/25/2017 5:59 PM  Performed by: Yair Sales  Authorized by: Yair Sales     Indications / Diagnosis:  Rib pain   ECG reviewed by me, the ED Provider: yes    Patient location:  ED  Previous ECG:     Comparison to cardiac monitor: No    Interpretation:     Interpretation: normal    Rate:     ECG rate:  73    ECG rate assessment: normal    Rhythm:     Rhythm: sinus rhythm    Ectopy:     Ectopy: none    QRS:     QRS axis:  Normal    QRS intervals:  Normal  Conduction:     Conduction: normal    ST segments:     ST segments:  Normal  T waves:     T waves: normal            Phone Contacts  ED Phone Contact    ED Course  ED Course   Izabella Breen Documentation   Comment Time   Faustino Iglesias attending paged 06/25 2440   Paged [phone] resident  06/25 1905                         Initial Sepsis Screening     None           Default Flowsheet Data (last 720 hours)      Sepsis Reassessment     None                MDM  Number of Diagnoses or Management Options  Chest pain:   Frequent falls:   Left knee pain:   Diagnosis management comments: Discussed with patient the results of the lab test  Will admit patient for chest pain workup, physical and occupational therapy evaluation as well as for repetitive falls  Patient verbalizes understanding and agrees with the above assessment and plan         Amount and/or Complexity of Data Reviewed  Clinical lab tests: ordered and reviewed  Tests in the radiology section of CPT®: ordered and reviewed  Review and summarize past medical records: yes  Independent visualization of images, tracings, or specimens: yes      CritCare Time    Disposition  Final diagnoses:   Chest pain   Left knee pain Frequent falls     ED Disposition     ED Disposition Condition Comment    Admit  Case was discussed with Dr Radha Bernal and the patient's admission status was agreed to be Admission Status: inpatient status to the service of Dr Radha Bernal   Follow-up Information    None       Patient's Medications   Discharge Prescriptions    No medications on file     No discharge procedures on file      ED Provider  Electronically Signed by       Jaime Dominguez PA-C  06/25/17 0309

## 2017-06-25 NOTE — ED NOTES
Patient and son are very talkative  [employer] at bedside       Jamaica Campbell RN  06/25/17 [id number]

## 2017-06-26 ENCOUNTER — APPOINTMENT (INPATIENT)
Dept: RADIOLOGY | Facility: HOSPITAL | Age: 77
DRG: 605 | End: 2017-06-26
Payer: MEDICARE

## 2017-06-26 ENCOUNTER — GENERIC CONVERSION - ENCOUNTER (OUTPATIENT)
Dept: OTHER | Facility: OTHER | Age: 77
End: 2017-06-26

## 2017-06-26 ENCOUNTER — APPOINTMENT (INPATIENT)
Dept: NON INVASIVE DIAGNOSTICS | Facility: HOSPITAL | Age: 77
DRG: 605 | End: 2017-06-26
Payer: MEDICARE

## 2017-06-26 PROBLEM — M79.7 FIBROMYALGIA: Status: ACTIVE | Noted: 2017-06-26

## 2017-06-26 PROBLEM — E78.5 HYPERLIPIDEMIA: Status: ACTIVE | Noted: 2017-06-26

## 2017-06-26 PROBLEM — M54.9 BACK PAIN: Status: ACTIVE | Noted: 2017-06-26

## 2017-06-26 PROBLEM — E11.9 NON-INSULIN DEPENDENT TYPE 2 DIABETES MELLITUS (HCC): Status: ACTIVE | Noted: 2017-06-26

## 2017-06-26 PROBLEM — I10 HYPERTENSION: Status: ACTIVE | Noted: 2017-06-26

## 2017-06-26 PROBLEM — R32 URINARY INCONTINENCE: Status: ACTIVE | Noted: 2017-06-26

## 2017-06-26 PROBLEM — M25.562 LEFT KNEE PAIN: Status: ACTIVE | Noted: 2017-06-26

## 2017-06-26 PROBLEM — K58.9 IBS (IRRITABLE BOWEL SYNDROME): Status: ACTIVE | Noted: 2017-06-26

## 2017-06-26 LAB
ANION GAP SERPL CALCULATED.3IONS-SCNC: 10 MMOL/L (ref 4–13)
BUN SERPL-MCNC: 11 MG/DL (ref 5–25)
CALCIUM SERPL-MCNC: 9.2 MG/DL (ref 8.3–10.1)
CHLORIDE SERPL-SCNC: 106 MMOL/L (ref 100–108)
CHOLEST SERPL-MCNC: 200 MG/DL (ref 50–200)
CO2 SERPL-SCNC: 27 MMOL/L (ref 21–32)
CREAT SERPL-MCNC: 0.56 MG/DL (ref 0.6–1.3)
ERYTHROCYTE [DISTWIDTH] IN BLOOD BY AUTOMATED COUNT: 13.1 % (ref 11.6–15.1)
EST. AVERAGE GLUCOSE BLD GHB EST-MCNC: 171 MG/DL
GFR SERPL CREATININE-BSD FRML MDRD: >60 ML/MIN/1.73SQ M
GLUCOSE SERPL-MCNC: 103 MG/DL (ref 65–140)
GLUCOSE SERPL-MCNC: 108 MG/DL (ref 65–140)
GLUCOSE SERPL-MCNC: 135 MG/DL (ref 65–140)
GLUCOSE SERPL-MCNC: 139 MG/DL (ref 65–140)
GLUCOSE SERPL-MCNC: 158 MG/DL (ref 65–140)
GLUCOSE SERPL-MCNC: 99 MG/DL (ref 65–140)
HBA1C MFR BLD: 7.6 % (ref 4.2–6.3)
HCT VFR BLD AUTO: 37.7 % (ref 37–47)
HDLC SERPL-MCNC: 59 MG/DL (ref 40–60)
HGB BLD-MCNC: 12.7 G/DL (ref 12–16)
LDLC SERPL CALC-MCNC: 105 MG/DL (ref 0–100)
MAGNESIUM SERPL-MCNC: 1.5 MG/DL (ref 1.6–2.6)
MCH RBC QN AUTO: 30.5 PG (ref 27–31)
MCHC RBC AUTO-ENTMCNC: 33.7 G/DL (ref 31.4–37.4)
MCV RBC AUTO: 91 FL (ref 82–98)
PLATELET # BLD AUTO: 175 THOUSANDS/UL (ref 130–400)
PMV BLD AUTO: 9.8 FL (ref 8.9–12.7)
POTASSIUM SERPL-SCNC: 3.6 MMOL/L (ref 3.5–5.3)
RBC # BLD AUTO: 4.16 MILLION/UL (ref 4.2–5.4)
SODIUM SERPL-SCNC: 143 MMOL/L (ref 136–145)
TRIGL SERPL-MCNC: 180 MG/DL
TROPONIN I SERPL-MCNC: <0.02 NG/ML
WBC # BLD AUTO: 9.8 THOUSAND/UL (ref 4.8–10.8)

## 2017-06-26 PROCEDURE — 83036 HEMOGLOBIN GLYCOSYLATED A1C: CPT | Performed by: FAMILY MEDICINE

## 2017-06-26 PROCEDURE — 80048 BASIC METABOLIC PNL TOTAL CA: CPT | Performed by: FAMILY MEDICINE

## 2017-06-26 PROCEDURE — 83735 ASSAY OF MAGNESIUM: CPT | Performed by: FAMILY MEDICINE

## 2017-06-26 PROCEDURE — 82948 REAGENT STRIP/BLOOD GLUCOSE: CPT

## 2017-06-26 PROCEDURE — 93017 CV STRESS TEST TRACING ONLY: CPT

## 2017-06-26 PROCEDURE — 85027 COMPLETE CBC AUTOMATED: CPT | Performed by: FAMILY MEDICINE

## 2017-06-26 PROCEDURE — A9502 TC99M TETROFOSMIN: HCPCS

## 2017-06-26 PROCEDURE — 87081 CULTURE SCREEN ONLY: CPT | Performed by: FAMILY MEDICINE

## 2017-06-26 PROCEDURE — 80061 LIPID PANEL: CPT | Performed by: FAMILY MEDICINE

## 2017-06-26 PROCEDURE — 84484 ASSAY OF TROPONIN QUANT: CPT | Performed by: FAMILY MEDICINE

## 2017-06-26 PROCEDURE — 78452 HT MUSCLE IMAGE SPECT MULT: CPT

## 2017-06-26 RX ORDER — ONDANSETRON 2 MG/ML
4 INJECTION INTRAMUSCULAR; INTRAVENOUS EVERY 6 HOURS PRN
Status: DISCONTINUED | OUTPATIENT
Start: 2017-06-26 | End: 2017-06-27 | Stop reason: HOSPADM

## 2017-06-26 RX ORDER — OXYCODONE AND ACETAMINOPHEN 10; 325 MG/1; MG/1
1 TABLET ORAL EVERY 6 HOURS PRN
COMMUNITY
End: 2018-03-06 | Stop reason: SDUPTHER

## 2017-06-26 RX ORDER — OXYCODONE HYDROCHLORIDE AND ACETAMINOPHEN 5; 325 MG/1; MG/1
2 TABLET ORAL EVERY 6 HOURS PRN
Status: DISCONTINUED | OUTPATIENT
Start: 2017-06-26 | End: 2017-06-27 | Stop reason: HOSPADM

## 2017-06-26 RX ORDER — NAPROXEN 500 MG/1
250 TABLET ORAL 2 TIMES DAILY WITH MEALS
Status: DISCONTINUED | OUTPATIENT
Start: 2017-06-26 | End: 2017-06-27 | Stop reason: HOSPADM

## 2017-06-26 RX ORDER — IBUPROFEN 600 MG/1
600 TABLET ORAL 2 TIMES DAILY WITH MEALS
Status: DISCONTINUED | OUTPATIENT
Start: 2017-06-26 | End: 2017-06-26 | Stop reason: ALTCHOICE

## 2017-06-26 RX ORDER — MAGNESIUM SULFATE HEPTAHYDRATE 40 MG/ML
2 INJECTION, SOLUTION INTRAVENOUS ONCE
Status: COMPLETED | OUTPATIENT
Start: 2017-06-26 | End: 2017-06-26

## 2017-06-26 RX ADMIN — INSULIN LISPRO 1 UNITS: 100 INJECTION, SOLUTION INTRAVENOUS; SUBCUTANEOUS at 21:46

## 2017-06-26 RX ADMIN — ENOXAPARIN SODIUM 40 MG: 40 INJECTION SUBCUTANEOUS at 09:56

## 2017-06-26 RX ADMIN — MAGNESIUM SULFATE HEPTAHYDRATE 2 G: 40 INJECTION, SOLUTION INTRAVENOUS at 14:32

## 2017-06-26 RX ADMIN — REGADENOSON 0.4 MG: 0.08 INJECTION, SOLUTION INTRAVENOUS at 09:40

## 2017-06-26 RX ADMIN — NAPROXEN 250 MG: 500 TABLET ORAL at 16:30

## 2017-06-26 RX ADMIN — ATORVASTATIN CALCIUM 40 MG: 40 TABLET, FILM COATED ORAL at 16:30

## 2017-06-26 RX ADMIN — METOPROLOL SUCCINATE 50 MG: 50 TABLET, FILM COATED, EXTENDED RELEASE ORAL at 09:56

## 2017-06-26 RX ADMIN — OXYCODONE HYDROCHLORIDE AND ACETAMINOPHEN 1 TABLET: 5; 325 TABLET ORAL at 09:58

## 2017-06-26 RX ADMIN — OXYCODONE HYDROCHLORIDE AND ACETAMINOPHEN 2 TABLET: 5; 325 TABLET ORAL at 21:44

## 2017-06-26 RX ADMIN — OXYCODONE HYDROCHLORIDE AND ACETAMINOPHEN 1 TABLET: 5; 325 TABLET ORAL at 01:58

## 2017-06-26 NOTE — CONSULTS
Orthopedics   Jarred Guzman 65 y o  female MRN: [id number]  Unit/Bed#: 2 Libia Terrell 306-01      Chief Complaint:   left knee pain    HPI:   65 y  o female complaining of left knee pain S/P fall recently  Pt states she had a trip and fall events with impact of the L knee into a hard object  Since that time she has had medial sided L knee pain  She notes occasional feeling of instability due to pain  Pt notes analgesics seem to make the pain better  Pt  Denies catching, locking, poping numbness or tingling in the L leg  Review Of Systems:   · Skin: Normal  · Neuro: See HPI  · Musculoskeletal: See HPI  · 14 point review of systems negative except as stated above     Past Medical History:   Past Medical History:   Diagnosis Date    Arthritis     DJD both knees    Diabetes mellitus     Fall with injury 02/15/2017    fell at home secondary to macular degeneration vision issues, neck pain currently    Fibromyalgia     had for many years  may have polymyalgia    Glaucoma     both eyes    H/O fibromyalgia     Herniated cervical disc     Hyperlipidemia     Hypertension     Liver disease     " fatty liver"    Lumbar herniated disc     with sciatica    Macular degeneration     both eyes, gets injections    Neuropathy     in the feet? falls on occ       Past Surgical History:   Past Surgical History:   Procedure Laterality Date    APPENDECTOMY      CHOLECYSTECTOMY      Lap    COLONOSCOPY      COLONOSCOPY N/A 2/21/2017    Procedure: COLONOSCOPY and biopsy, snare polypectomy;  Surgeon: Evan Lujan MD;  Location: [facility] GI LAB; Service:     DILATION AND CURETTAGE, DIAGNOSTIC / THERAPEUTIC      ESOPHAGOGASTRODUODENOSCOPY N/A 2/21/2017    Procedure: ESOPHAGOGASTRODUODENOSCOPY (EGD) and biopsy ;  Surgeon: Evan Lujan MD;  Location: [facility];   Service:     HEMORRHOID SURGERY      in her 18's    HYSTERECTOMY      complete    OOPHORECTOMY Left     age 21 then hysterectomy    MA REMV CATARACT EXTRACAP,INSERT LENS Right 5/19/2016    Procedure: EXTRACTION EXTRACAPSULAR CATARACT PHACO INTRAOCULAR LENS (IOL); Surgeon: Lilian Manzano MD;  Location: [facility] MAIN OR;  Service: Ophthalmology    TONSILLECTOMY      age 24       Family History:  Family history reviewed and non-contributory  Family History   Problem Relation Age of Onset    Coronary artery disease Father     Cancer Father      esophagus, trach    Peripheral vascular disease Father     Heart disease Sister      MI, in her 62s    Cancer Brother      bowel CA exp age 52    Cancer Maternal Grandmother      bowel CA       Social History:  Social History     Social History    Marital status:      Spouse name: N/A    Number of children: N/A    Years of education: N/A     Social History Main Topics    Smoking status: Never Smoker    Smokeless tobacco: Never Used    Alcohol use Yes      Comment: occ    Drug use: No    Sexual activity: Not Asked     Other Topics Concern    None     Social History Narrative    None       Allergies:    Allergies   Allergen Reactions    Other Hives     Cat Scan Dye           Labs:    0  Lab Value Date/Time   HCT 37 7 06/26/2017 0543   HCT 40 8 06/25/2017 1750   HCT 40 2 02/07/2017 1446   HGB 12 7 06/26/2017 0543   HGB 14 0 06/25/2017 1750   HGB 13 5 02/07/2017 1446   INR 1 01 06/25/2017 1750   WBC 9 80 06/26/2017 0543   WBC 11 20 (H) 06/25/2017 1750   WBC 7 60 02/07/2017 1446       Meds:    Current Facility-Administered Medications:     atorvastatin (LIPITOR) tablet 40 mg, 40 mg, Oral, Daily With Dinner, Evette Kaylene, DO    enoxaparin (LOVENOX) subcutaneous injection 40 mg, 40 mg, Subcutaneous, Daily, Evette Kaylene, , 40 mg at 06/26/17 0956    insulin lispro (HumaLOG) 100 units/mL subcutaneous injection 1-5 Units, 1-5 Units, Subcutaneous, HS, Evette Kaylene, DO    insulin lispro (HumaLOG) 100 units/mL subcutaneous injection 1-6 Units, 1-6 Units, Subcutaneous, TID AC **AND** Fingerstick Glucose (POCT), , , TID AC, Evette Kaylene, DO    metoprolol (LOPRESSOR) injection 2 5 mg, 2 5 mg, Intravenous, Q6H PRN, Evette Kaylene, DO    metoprolol (LOPRESSOR) injection 5 mg, 5 mg, Intravenous, Once, Evette Kaylene, DO, Stopped at 06/26/17 0138    metoprolol succinate (TOPROL-XL) 24 hr tablet 50 mg, 50 mg, Oral, QAM, Evette Kaylene, DO, 50 mg at 06/26/17 0956    oxyCODONE-acetaminophen (PERCOCET) 5-325 mg per tablet 1 tablet, 1 tablet, Oral, Q4H PRN, Evette Kaylene, DO, 1 tablet at 06/26/17 0958    Blood Culture:   No results found for: BLOODCX    Wound Culture:   No results found for: WOUNDCULT    Ins and Outs:  No intake/output data recorded  Physical Exam:   /79   Pulse 88   Temp 98 °F (36 7 °C) (Tympanic)   Resp 18   Ht 5' 2" (1 575 m)   Wt 86 4 kg (190 lb 7 6 oz)   SpO2 99%   BMI 34 84 kg/m²   Gen: Alert and oriented to person, place, time  HEENT: EOMI, eyes clear, moist mucus membranes, hearing intact  Respiratory: Bilateral chest rise  No audible wheezing found  Cardiovascular: Regular Rate and Rhythm  Abdomen: soft nontender/nondistended  Musculoskeletal: left lower extremity  · Skin intact, no erythema, no effusion  · Tender to palpation over Medial joint line  · Stable to varus/valgus stress  · Negative lachmans, Posterior draw, Keri's test  · Sensation intact L1-S1  · 4/5 strength to hip flexion/extension, knee flexion/extension ankle dorsi/plantar flexion, EHL/FHL      Radiology:   I personally reviewed the films  X-rays left knee shows arthritis    _*_*_*_*_*_*_*_*_*_*_*_*_*_*_*_*_*_*_*_*_*_*_*_*_*_*_*_*_*_*_*_*_*_*_*_*_*_*_*_*_*    Assessment:  65 y  o female with left knee contusion and OA    Plan:   · Weight bearing as tolerated  left lower extremity  · PT  · Pain control  · Dispo: Ok for discharge from ortho perspective  · F/U OP if pain persists for consideration of injection vs  More advanced imaging    Zacarias Tony MD

## 2017-06-26 NOTE — CASE MANAGEMENT
Initial Clinical Review    Admission: Date/Time/Statement: 6/25/17 @ 1906 Inpatient Written     Orders Placed This Encounter   Procedures    Inpatient Admission (expected length of stay for this patient is greater than two midnights)     Standing Status:   Standing     Number of Occurrences:   1     Order Specific Question:   Admitting Physician     Answer:   Ady Brock     Order Specific Question:   Level of Care     Answer:   Med Surg [16]     Order Specific Question:   Estimated length of stay     Answer:   More than 2 Midnights     Order Specific Question:   Certification     Answer:   I certify that inpatient services are medically necessary for this patient for a duration of greater than two midnights  See H&P and MD Progress Notes for additional information about the patient's course of treatment  ED: Date/Time/Mode of Arrival:   ED Arrival Information     Expected Arrival Acuity Means of Arrival Escorted By Service Admission Type    - 6/25/2017 16:45 Urgent Walk-In Family Member General Medicine Urgent    Arrival Complaint    FALL, ARM PAIN, HEADACHE, BACK PAIN, LEG PAIN          Chief Complaint:   Chief Complaint   Patient presents with    Fall     pt has back problems and legs give out  a lot,states she fell 2 days ago when she did not lift her foot high enough,c/o left arm pain,having headache today       History of Illness: Patient is a 26-year-old female, h/o macular degeneration, fibromyalgia, HTN, HLD, presenting today with acute on chronic left knee pain x 3 days after she had a fall when both knees gave out on her and then fell onto her buttock with her legs underneath her  She has been having multiple falls more frequently over the past year  She is now using a walker at home and is able to ambulate with her son who helps, however lives by herself  Took a percocet before coming to the ED   Goes on to mention that she has been having chest pain on and off over the past few months that comes while at rest and while ambulating, without specific exacerbation factors  It is sharp and short lived and occurs on a daily basis  This pain began after she broke left side ribs back in February  Not actively having chest pain  ED Vital Signs:   ED Triage Vitals   Temperature Pulse Respirations Blood Pressure SpO2   06/25/17 1653 06/25/17 1653 06/25/17 1653 06/25/17 1653 06/25/17 1653   98 2 °F (36 8 °C) 80 18 (!) 185/100 97 %      Temp Source Heart Rate Source Patient Position BP Location FiO2 (%)   06/25/17 2026 06/25/17 1901 06/25/17 1653 06/25/17 1653 --   Tympanic Monitor Sitting Left arm       Pain Score       06/25/17 1653       6        Wt Readings from Last 1 Encounters:   06/25/17 86 4 kg (190 lb 7 6 oz)       Vital Signs (abnormal): /77    Abnormal Labs/Diagnostic Test Results:   WBC 11 20 (H)     Creatinine 0 59 (L)     XR right knee:  Impression: No acute osseous abnormality  Xr Hips Bilateral 5+ W Pelvis:  Impression: No evidence of acute traumatic injury to the pelvis and bilateral hips  There is mild osteoarthritis of both hips  EKG:  Rate 73, NSR    NM stress test:  IMPRESSIONS: Normal study after pharmacologic vasodilation  Myocardial perfusion imaging was normal at rest and with stress  ED Treatment:   Medication Administration from 06/25/2017 1645 to 06/25/2017 2023       Date/Time Order Dose Route Action     06/25/2017 1858 sodium chloride 0 9 % bolus 1,000 mL 1,000 mL Intravenous New Bag          Past Medical/Surgical History:    Active Ambulatory Problems     Diagnosis Date Noted    Cataract 05/16/2016     Resolved Ambulatory Problems     Diagnosis Date Noted    No Resolved Ambulatory Problems     Past Medical History:   Diagnosis Date    Arthritis     Diabetes mellitus     Fall with injury 02/15/2017    Fibromyalgia     Glaucoma     H/O fibromyalgia     Herniated cervical disc     Hyperlipidemia     Hypertension     Liver disease     Lumbar herniated disc     Macular degeneration     Neuropathy        Admitting Diagnosis: Back pain [M54 9]  Chest pain [R07 9]  Left knee pain [M25 562]  Frequent falls [R29 6]    Age/Sex: 65 y o  female    Assessment/Plan: This is a 65 y o  F with a PMHx of HTN,HLD,DM who presents for chest pain and R  Knee pain s/p fall  Patient is expected to stay for atleast 2 midnights, with an expected length of stay of 3 or more days  Patient will be placed under the service of Dr Huey Alonso  The following assessment and plan was discussed with attending and the Senior Resident      Chest pain: Risk factors for ACS include HTN, DM, HLD  Patient does have a history of rib injury, pain can also be due to this  Need to rule out ACS, first troponin negative, trend troponins, place on telemetry, cardiac consult; nuclear stress test in AM  Acute on chronic left knee pain s/p fall: Fall precautions, XR of hip and L  knee negative, order PT/OT, morphine 1mg q4h PRN for severe pain, home percocet for moderate pain, Consult ortho for further eval 2/2 severe worsening of functionality 2/2 the acute pain with no fractures seen on XR  Hx of L  Sided rib fracture: order CXR to assess this, ordered incentive spirometry to prevent atelectasis  HTN: Initial BP elevated on admission - possibly to pain; repeat improved  Continue home metoprolol, monitor vital signs, IV Lopressor PRN for SBP >180  HLD: Continue Crestor, last lipid panel was in November 2016, LDL 80, rest of the labs are unremarkable, will reorder lipid panel to reassess status of cholesterol  NIDDM: We'll hold metformin, place patient on insulin sliding scale with regular Accu-Cheks, last hemoglobin A1c was 7 3 in November 2016, will reorder another A1c  Chronic pain 2/2 Fibromyalgia: Continue her home Percocet  IBS: pt reports hx of IBS, CT abd in Feb 2017 shows diverticulosis  Monitor for diarrhea or constipation, treat as needed  No recent exacerbation of chronic abdominal pain    Chronic urinary incontinence: supportive care  DVT prophylaxis: SCDs, SQL  Global: HL, Cardiac diet, NPO after midnight for stress test in AM    Admission Orders:  Telemetry  Consult cardiology, orthopedic sx  NPO  Fall precautions  OOB with assistance  Accuchecks QAC and QHS with coverage  NM myocardial perfusion stress test  Sequential compression device  Incentive spirometry   MRSA cx pending  Trop stat on 06/26/17    Scheduled Meds:   atorvastatin 40 mg Oral Daily With Dinner   enoxaparin 40 mg Subcutaneous Daily   insulin lispro 1-5 Units Subcutaneous HS   insulin lispro 1-6 Units Subcutaneous TID AC   metoprolol 5 mg Intravenous Once   metoprolol succinate 50 mg Oral QAM     Continuous Infusions:    PRN Meds: metoprolol    oxyCODONE-acetaminophen 5-325mg x1 thus far    Network Utilization [address] [place] [address]  Main Number [phone]; Fax [phone]    L&D/PEDS/NICU Medical Necessity Denials should be called to the UR Nurses    ATTENTION: Be aware that we have recently moved to a new  office  We have a new phone and fax for the Network Utilization Review Department  Call with any questions or concerns to [phone] and follow the prompts  All voicemails are confidential  Fax determinations, denials, and requests for initial/continue stay review clinical to [phone]  For all requests, it would be easier if you could please send logs  *Due to high call volume, we can respond faster if you email us to our secured email listed above  *

## 2017-06-26 NOTE — CONSULTS
CARDIOLOGY CONSULTATION  Jarad Nielsen 65 y o  female MRN: [id number]  Unit/Bed#: [address] [place] [address] Encounter: [id number]      History of Present Illness   Physician Requesting Consult: Ciara Delacruz MD  Reason for Consult / Principal Problem: chest pain    Assessment/Plan   Chest pain- r/o acute coronary syndrome  Neg troponin *2  Stress test negative for evidence of focal ischemia  Possible MSK/rib pain? Htn- elevated bp secondary to pain   Hld  DM  Fibromyalgia  Mechanical fall        HPI: Jarad Nielsen is a 65y o  year old female who presents with left-sided chest pain and knee pain  She reports having a mechanical fall after which she had substernal pain radiating to her right arm  She also noted having knee pain and trouble with flexion  She reports having a mechanical fall at home  She denies feeling dizzy or lightheaded  She reports having any fevers or chills  She reports being very clumsy but also feeling unstable sometimes on her feet  Having some lower leg weakness  He denies having any prior history of myocardial infarction  She denies having any prior history of PCI or stents  Historical Information   Past Medical History:   Diagnosis Date    Arthritis     DJD both knees    Diabetes mellitus     Fall with injury 02/15/2017    fell at home secondary to macular degeneration vision issues, neck pain currently    Fibromyalgia     had for many years   may have polymyalgia    Glaucoma     both eyes    H/O fibromyalgia     Herniated cervical disc     Hyperlipidemia     Hypertension     Liver disease     " fatty liver"    Lumbar herniated disc     with sciatica    Macular degeneration     both eyes, gets injections    Neuropathy     in the feet? falls on occ     Past Surgical History:   Procedure Laterality Date    APPENDECTOMY      CHOLECYSTECTOMY      Lap    COLONOSCOPY      COLONOSCOPY N/A 2/21/2017    Procedure: COLONOSCOPY and biopsy, snare polypectomy;  Surgeon: Linda Mathews MD; Location: [facility] GI LAB; Service:     DILATION AND CURETTAGE, DIAGNOSTIC / THERAPEUTIC      ESOPHAGOGASTRODUODENOSCOPY N/A 2/21/2017    Procedure: ESOPHAGOGASTRODUODENOSCOPY (EGD) and biopsy ;  Surgeon: Ajay Anderson MD;  Location: [facility] GI LAB; Service:     HEMORRHOID SURGERY      in her 19's    HYSTERECTOMY      complete    OOPHORECTOMY Left     age 21 then hysterectomy    GA REMV CATARACT EXTRACAP,INSERT LENS Right 5/19/2016    Procedure: EXTRACTION EXTRACAPSULAR CATARACT PHACO INTRAOCULAR LENS (IOL); Surgeon: Andres Albright MD;  Location[facility] MAIN OR;  Service: Ophthalmology    TONSILLECTOMY      age 24     History   Alcohol Use    Yes     Comment: occ     History   Drug Use No     History   Smoking Status    Never Smoker   Smokeless Tobacco    Never Used     Family History   Problem Relation Age of Onset    Coronary artery disease Father     Cancer Father      esophagus, trach    Peripheral vascular disease Father     Heart disease Sister      MI, in her 62s    Cancer Brother      bowel CA exp age 52    Cancer Maternal Grandmother      bowel CA       Meds/Allergies   Prior to Admission medications    Medication Sig Start Date End Date Taking? Authorizing Provider   amitriptyline (ELAVIL) 25 mg tablet Take 25 mg by mouth daily at bedtime   Yes Historical Provider, MD   oxyCODONE-acetaminophen (PERCOCET)  mg per tablet Take 1 tablet by mouth every 6 (six) hours as needed for severe pain   Yes Deepak Morgan,    pregabalin (LYRICA) 75 mg capsule Take 75 mg by mouth 2 (two) times a day   Yes Historical Provider, MD   oxyCODONE-acetaminophen (ROXICET) 5-325 mg/5 mL solution Take by mouth every 4 (four) hours as needed for moderate pain  6/26/17 Yes Historical Provider, MD   ascorbic acid (VITAMIN C) 500 mg tablet Take 500 mg by mouth 2 (two) times a day  Historical Provider, MD   bimatoprost (LUMIGAN) 0 01 % ophthalmic drops Administer 1 drop to the right eye daily at bedtime  Historical Provider, MD   metFORMIN (GLUCOPHAGE) 850 mg tablet Take 850 mg by mouth 2 (two) times a day with meals  Historical Provider, MD   metoprolol succinate (TOPROL-XL) 50 mg 24 hr tablet Take 50 mg by mouth every morning  Historical Provider, MD   Multiple Vitamins-Minerals (PRESERVISION AREDS 2 PO) Take by mouth 2 (two) times a day  Historical Provider, MD   Omega-3 Fatty Acids (FISH OIL PO) Take 1,000 mg by mouth 2 (two) times a day  Historical Provider, MD   rosuvastatin (CRESTOR) 20 MG tablet Take 20 mg by mouth daily at bedtime      Historical Provider, MD     Current Facility-Administered Medications   Medication Dose Route Frequency Provider Last Rate Last Dose    atorvastatin (LIPITOR) tablet 40 mg  40 mg Oral Daily With [employer], DO   40 mg at 06/26/17 1630    enoxaparin (LOVENOX) subcutaneous injection 40 mg  40 mg Subcutaneous Daily [facility]   40 mg at 06/26/17 0956    insulin lispro (HumaLOG) 100 units/mL subcutaneous injection 1-5 Units  1-5 Units Subcutaneous HS [facility], DO        insulin lispro (HumaLOG) 100 units/mL subcutaneous injection 1-6 Units  1-6 Units Subcutaneous TID AC Evette Kaylene, DO        metoprolol (LOPRESSOR) injection 2 5 mg  2 5 mg Intravenous Q6H PRN [facility], DO        metoprolol (LOPRESSOR) injection 5 mg  5 mg Intravenous Once Evette Kaylene, DO   Stopped at 06/26/17 0138    metoprolol succinate (TOPROL-XL) 24 hr tablet 50 mg  50 mg Oral QAM [facility], DO   50 mg at 06/26/17 0956    naproxen (NAPROSYN) tablet 250 mg  250 mg Oral BID With Meals Sachdeep Paul   250 mg at 06/26/17 1630    ondansetron (ZOFRAN) injection 4 mg  4 mg Intravenous Q6H [facility]N [facility]        oxyCODONE-acetaminophen (PERCOCET) 5-325 mg per tablet 1 tablet  1 tablet Oral Q4H PRN Evette Kaylene, DO   1 tablet at 06/26/17 0958    oxyCODONE-acetaminophen (PERCOCET) 5-325 mg per tablet 2 tablet  2 tablet Oral Q6H PRN Wen Vela Allergies   Allergen Reactions    Other Hives     Cat Scan Dye       Review of systems  CONSTITUTIONAL:  No weight loss, fever, chills, weakness or fatigue  HEENT:  Eyes:  No visual loss, blurred vision, double vision or yellow sclerae  Ears, Nose, Throat:  No hearing loss, sneezing, congestion, runny nose or sore throat  SKIN:  No rash or itching  CARDIOVASCULAR:  As per hpi  RESPIRATORY:  No shortness of breath, cough or sputum  GASTROINTESTINAL:  No anorexia, nausea, vomiting or diarrhea  No abdominal pain or blood  GENITOURINARY:  Burning on urination  No flank pain  NEUROLOGICAL:  No headache, dizziness, syncope, paralysis, ataxia, numbness or tingling in the extremities  No change in bowel or bladder control  MUSCULOSKELETAL:  As per hpi  HEMATOLOGIC:  No anemia, bleeding or bruising  LYMPHATICS:  No enlarged nodes  No history of splenectomy  PSYCHIATRIC:  No active suicidal or homicidal ideation  ENDOCRINOLOGIC:  No reports of sweating, cold or heat intolerance  No polyuria or polydipsia  ALLERGIES:  No history of asthma, hives, eczema or rhinitis  More than 10 systems reviewed and otherwise noncontributory  Objective   Vitals: Blood pressure (!) 172/72, pulse 80, temperature 98 6 °F (37 °C), temperature source Tympanic, resp  rate 18, height 5' 2" (1 575 m), weight 86 4 kg (190 lb 7 6 oz), SpO2 98 %  Physical Exam   Constitutional: She is oriented to person, place, and time  No distress  HENT:   Head: Normocephalic and atraumatic  Right Ear: External ear normal    Left Ear: External ear normal    Nose: Nose normal    Mouth/Throat: No oropharyngeal exudate  Eyes: Conjunctivae are normal  Pupils are equal, round, and reactive to light  Right eye exhibits no discharge  Left eye exhibits no discharge  No scleral icterus  Neck: Normal range of motion  No JVD present  No tracheal deviation present  No thyromegaly present     Cardiovascular: Normal rate, regular rhythm and intact distal pulses  Exam reveals no gallop and no friction rub  No murmur heard  Pulmonary/Chest: Effort normal and breath sounds normal  No stridor  No respiratory distress  She has no wheezes  She has no rales  She exhibits no tenderness  Abdominal: Soft  Bowel sounds are normal  She exhibits no distension and no mass  There is no tenderness  There is no rebound and no guarding  Genitourinary:   Genitourinary Comments: No CVA tenderness   Musculoskeletal: She exhibits deformity  She exhibits no edema or tenderness  Neurological: She is alert and oriented to person, place, and time  She displays normal reflexes  She exhibits normal muscle tone  Skin: Skin is warm and dry  No rash noted  She is not diaphoretic  There is erythema  Psychiatric: She has a normal mood and affect   Her behavior is normal  Judgment and thought content normal          Recent Results (from the past 24 hour(s))   CBC and differential    Collection Time: 06/25/17  5:50 PM   Result Value Ref Range    WBC 11 20 (H) 4 80 - 10 80 Thousand/uL    RBC 4 53 4 20 - 5 40 Million/uL    Hemoglobin 14 0 12 0 - 16 0 g/dL    Hematocrit 40 8 37 0 - 47 0 %    MCV 90 82 - 98 fL    MCH 30 9 27 0 - 31 0 pg    MCHC 34 3 31 4 - 37 4 g/dL    RDW 13 4 11 6 - 15 1 %    MPV 9 5 8 9 - 12 7 fL    Platelets 180 793 - 310 Thousands/uL    nRBC 0 /100 WBCs    Neutrophils Relative 64 43 - 75 %    Lymphocytes Relative 26 14 - 44 %    Monocytes Relative 7 4 - 12 %    Eosinophils Relative 2 0 - 6 %    Basophils Relative 1 0 - 1 %    Neutrophils Absolute 7 20 1 85 - 7 62 Thousands/µL    Lymphocytes Absolute 2 90 0 60 - 4 47 Thousands/µL    Monocytes Absolute 0 80 0 17 - 1 22 Thousand/µL    Eosinophils Absolute 0 30 0 00 - 0 61 Thousand/µL    Basophils Absolute 0 10 0 00 - 0 10 Thousands/µL   Protime-INR    Collection Time: 06/25/17  5:50 PM   Result Value Ref Range    Protime 10 6 9 4 - 11 7 seconds    INR 1 01 0 86 - 1 16   APTT    Collection Time: 06/25/17  5:50 PM   Result Value Ref Range    PTT 24 24 - 33 seconds   Comprehensive metabolic panel    Collection Time: 06/25/17  5:50 PM   Result Value Ref Range    Sodium 140 136 - 145 mmol/L    Potassium 4 3 3 5 - 5 3 mmol/L    Chloride 105 100 - 108 mmol/L    CO2 25 21 - 32 mmol/L    Anion Gap 10 4 - 13 mmol/L    BUN 16 5 - 25 mg/dL    Creatinine 0 59 (L) 0 60 - 1 30 mg/dL    Glucose 76 65 - 140 mg/dL    Calcium 10 0 8 3 - 10 1 mg/dL    AST 26 5 - 45 U/L    ALT 38 12 - 78 U/L    Alkaline Phosphatase 91 46 - 116 U/L    Total Protein 7 6 6 4 - 8 2 g/dL    Albumin 3 5 3 5 - 5 0 g/dL    Total Bilirubin 0 50 0 20 - 1 00 mg/dL    eGFR >60 0 ml/min/1 73sq m   Troponin I    Collection Time: 06/25/17  5:50 PM   Result Value Ref Range    Troponin I <0 02 <=0 04 ng/mL   Fingerstick Glucose (POCT)    Collection Time: 06/26/17  1:07 AM   Result Value Ref Range    POC Glucose 108 65 - 140 mg/dl   Basic metabolic panel    Collection Time: 06/26/17  5:43 AM   Result Value Ref Range    Sodium 143 136 - 145 mmol/L    Potassium 3 6 3 5 - 5 3 mmol/L    Chloride 106 100 - 108 mmol/L    CO2 27 21 - 32 mmol/L    Anion Gap 10 4 - 13 mmol/L    BUN 11 5 - 25 mg/dL    Creatinine 0 56 (L) 0 60 - 1 30 mg/dL    Glucose 99 65 - 140 mg/dL    Calcium 9 2 8 3 - 10 1 mg/dL    eGFR >60 0 ml/min/1 73sq m   Magnesium    Collection Time: 06/26/17  5:43 AM   Result Value Ref Range    Magnesium 1 5 (L) 1 6 - 2 6 mg/dL   CBC (With Platelets)    Collection Time: 06/26/17  5:43 AM   Result Value Ref Range    WBC 9 80 4 80 - 10 80 Thousand/uL    RBC 4 16 (L) 4 20 - 5 40 Million/uL    Hemoglobin 12 7 12 0 - 16 0 g/dL    Hematocrit 37 7 37 0 - 47 0 %    MCV 91 82 - 98 fL    MCH 30 5 27 0 - 31 0 pg    MCHC 33 7 31 4 - 37 4 g/dL    RDW 13 1 11 6 - 15 1 %    Platelets 038 467 - 422 Thousands/uL    MPV 9 8 8 9 - 12 7 fL   Hemoglobin A1c    Collection Time: 06/26/17  5:43 AM   Result Value Ref Range    Hemoglobin A1C 7 6 (H) 4 2 - 6 3 %     mg/dl   Lipid Panel with Direct LDL reflex    Collection Time: 06/26/17  5:43 AM   Result Value Ref Range    Cholesterol 200 50 - 200 mg/dL    Triglycerides 180 (H) <=150 mg/dL    HDL, Direct 59 40 - 60 mg/dL    LDL Calculated 105 (H) 0 - 100 mg/dL   Troponin I    Collection Time: 06/26/17  5:43 AM   Result Value Ref Range    Troponin I <0 02 <=0 04 ng/mL   Fingerstick Glucose (POCT)    Collection Time: 06/26/17  7:45 AM   Result Value Ref Range    POC Glucose 103 65 - 140 mg/dl   Fingerstick Glucose (POCT)    Collection Time: 06/26/17 12:50 PM   Result Value Ref Range    POC Glucose 139 65 - 140 mg/dl   Fingerstick Glucose (POCT)    Collection Time: 06/26/17  3:45 PM   Result Value Ref Range    POC Glucose 135 65 - 140 mg/dl     Imaging: I have personally reviewed pertinent films in PACS  EKG: reviewed      Counseling / Coordination of Care  Total floor / unit time spent today 40 minutes   Greater than fifty percent of time spent at bedside for coordination of care, patient counseling, history taking:

## 2017-06-26 NOTE — PROGRESS NOTES
[facility] Practice Progress Note - Eunice Huitron 65 y o  female MRN: [id number]    Unit/Bed#: [address] [place] [address] Encounter: [id number]      Assessment/Plan:  Chest Pain rule out ACS  · Troponin x 2 negative  · Lipid Panel: Total Chol 200, TGs 180, HDL 59,   · CXR: No active pulmonary disease  · Nuclear stress test equivocal   · Continue to metoprolol, atorvastatin  Acute on Chronic Left Knee Pain s/p Fall  · Not improved  · Pain medication adjusted to provide better pain control, added naproxen, patient on home regimen of oxycodone  · XR hip shows mild arthritis bilaterally, no acute osseous abnormality of left knee  HTN  · Systolic BP range [id number]  · Likely elevated secondary to pain, expect improvement with better pain control  · Continue to monitor, consider adding anti-hypertensive if not improved  HLD  · Continue atorvastatin   Diabetes Mellitus Type II  · HbA1c 7 6  · Continue ISS, accuchecks  Fibromyalgia   · Continue home regimen of oxycodone  Chronic urinary incontinence  · Stable, continue to monitor  Hx of IBS      Subjective:   Patient reports severe pain in her left leg, especially at the knee  Has difficulty moving it, especially with flexion  Also reports right leg pain, not as severe  Denies fever, chills, headache, shortness of breath, current chest pain, abdominal pain  Objective:     Vitals: Blood pressure (!) 172/72, pulse 80, temperature 98 6 °F (37 °C), temperature source Tympanic, resp  rate 18, height 5' 2" (1 575 m), weight 86 4 kg (190 lb 7 6 oz), SpO2 98 %  ,Body mass index is 34 84 kg/m²    Wt Readings from Last 3 Encounters:   06/25/17 86 4 kg (190 lb 7 6 oz)   02/23/17 86 2 kg (190 lb)   05/16/16 86 2 kg (190 lb)     No intake or output data in the 24 hours ending 06/26/17 1617    Physical Exam: General appearance: alert and cooperative  Neck: supple, symmetrical, trachea midline  Lungs: clear to auscultation bilaterally  Heart: regular rate and rhythm, S1, S2 normal, no murmur, click, rub or gallop  Abdomen: soft, non-tender; bowel sounds normal; no masses,  no organomegaly  Extremities: +tenderness to palpation on posterior aspect of left knee, no baker's cyst appreciated; left knee flexion to 50-60 degrees; full left knee extension; no edema/effusions noted; 4/5 strength in lower extremities bilatearlly      Recent Results (from the past 24 hour(s))   CBC and differential    Collection Time: 06/25/17  5:50 PM   Result Value Ref Range    WBC 11 20 (H) 4 80 - 10 80 Thousand/uL    RBC 4 53 4 20 - 5 40 Million/uL    Hemoglobin 14 0 12 0 - 16 0 g/dL    Hematocrit 40 8 37 0 - 47 0 %    MCV 90 82 - 98 fL    MCH 30 9 27 0 - 31 0 pg    MCHC 34 3 31 4 - 37 4 g/dL    RDW 13 4 11 6 - 15 1 %    MPV 9 5 8 9 - 12 7 fL    Platelets 861 292 - 829 Thousands/uL    nRBC 0 /100 WBCs    Neutrophils Relative 64 43 - 75 %    Lymphocytes Relative 26 14 - 44 %    Monocytes Relative 7 4 - 12 %    Eosinophils Relative 2 0 - 6 %    Basophils Relative 1 0 - 1 %    Neutrophils Absolute 7 20 1 85 - 7 62 Thousands/µL    Lymphocytes Absolute 2 90 0 60 - 4 47 Thousands/µL    Monocytes Absolute 0 80 0 17 - 1 22 Thousand/µL    Eosinophils Absolute 0 30 0 00 - 0 61 Thousand/µL    Basophils Absolute 0 10 0 00 - 0 10 Thousands/µL   Protime-INR    Collection Time: 06/25/17  5:50 PM   Result Value Ref Range    Protime 10 6 9 4 - 11 7 seconds    INR 1 01 0 86 - 1 16   APTT    Collection Time: 06/25/17  5:50 PM   Result Value Ref Range    PTT 24 24 - 33 seconds   Comprehensive metabolic panel    Collection Time: 06/25/17  5:50 PM   Result Value Ref Range    Sodium 140 136 - 145 mmol/L    Potassium 4 3 3 5 - 5 3 mmol/L    Chloride 105 100 - 108 mmol/L    CO2 25 21 - 32 mmol/L    Anion Gap 10 4 - 13 mmol/L    BUN 16 5 - 25 mg/dL    Creatinine 0 59 (L) 0 60 - 1 30 mg/dL    Glucose 76 65 - 140 mg/dL    Calcium 10 0 8 3 - 10 1 mg/dL    AST 26 5 - 45 U/L    ALT 38 12 - 78 U/L    Alkaline Phosphatase 91 46 - 116 U/L    Total Protein 7 6 6 4 - 8 2 g/dL    Albumin 3 5 3 5 - 5 0 g/dL    Total Bilirubin 0 50 0 20 - 1 00 mg/dL    eGFR >60 0 ml/min/1 73sq m   Troponin I    Collection Time: 06/25/17  5:50 PM   Result Value Ref Range    Troponin I <0 02 <=0 04 ng/mL   Fingerstick Glucose (POCT)    Collection Time: 06/26/17  1:07 AM   Result Value Ref Range    POC Glucose 108 65 - 140 mg/dl   Basic metabolic panel    Collection Time: 06/26/17  5:43 AM   Result Value Ref Range    Sodium 143 136 - 145 mmol/L    Potassium 3 6 3 5 - 5 3 mmol/L    Chloride 106 100 - 108 mmol/L    CO2 27 21 - 32 mmol/L    Anion Gap 10 4 - 13 mmol/L    BUN 11 5 - 25 mg/dL    Creatinine 0 56 (L) 0 60 - 1 30 mg/dL    Glucose 99 65 - 140 mg/dL    Calcium 9 2 8 3 - 10 1 mg/dL    eGFR >60 0 ml/min/1 73sq m   Magnesium    Collection Time: 06/26/17  5:43 AM   Result Value Ref Range    Magnesium 1 5 (L) 1 6 - 2 6 mg/dL   CBC (With Platelets)    Collection Time: 06/26/17  5:43 AM   Result Value Ref Range    WBC 9 80 4 80 - 10 80 Thousand/uL    RBC 4 16 (L) 4 20 - 5 40 Million/uL    Hemoglobin 12 7 12 0 - 16 0 g/dL    Hematocrit 37 7 37 0 - 47 0 %    MCV 91 82 - 98 fL    MCH 30 5 27 0 - 31 0 pg    MCHC 33 7 31 4 - 37 4 g/dL    RDW 13 1 11 6 - 15 1 %    Platelets 648 363 - 157 Thousands/uL    MPV 9 8 8 9 - 12 7 fL   Hemoglobin A1c    Collection Time: 06/26/17  5:43 AM   Result Value Ref Range    Hemoglobin A1C 7 6 (H) 4 2 - 6 3 %     mg/dl   Lipid Panel with Direct LDL reflex    Collection Time: 06/26/17  5:43 AM   Result Value Ref Range    Cholesterol 200 50 - 200 mg/dL    Triglycerides 180 (H) <=150 mg/dL    HDL, Direct 59 40 - 60 mg/dL    LDL Calculated 105 (H) 0 - 100 mg/dL   Troponin I    Collection Time: 06/26/17  5:43 AM   Result Value Ref Range    Troponin I <0 02 <=0 04 ng/mL   Fingerstick Glucose (POCT)    Collection Time: 06/26/17  7:45 AM   Result Value Ref Range    POC Glucose 103 65 - 140 mg/dl   Fingerstick Glucose (POCT)    Collection Time: 06/26/17 12:50 PM   Result Value Ref Range    POC Glucose 139 65 - 140 mg/dl   Fingerstick Glucose (POCT)    Collection Time: 06/26/17  3:45 PM   Result Value Ref Range    POC Glucose 135 65 - 140 mg/dl       Current Facility-Administered Medications   Medication Dose Route Frequency Provider Last Rate Last Dose    atorvastatin (LIPITOR) tablet 40 mg  40 mg Oral Daily With [employer], DO        enoxaparin (LOVENOX) subcutaneous injection 40 mg  40 mg Subcutaneous Daily Evette Kaylene, DO   40 mg at 06/26/17 0956    insulin lispro (HumaLOG) 100 units/mL subcutaneous injection 1-5 Units  1-5 Units [facility]        insulin lispro (HumaLOG) 100 units/mL subcutaneous injection 1-6 Units  1-6 Units Subcutaneous TID AC Evette Kaylene, DO        magnesium sulfate 2 g/50 mL IVPB (premix) 2 g  2 g Intravenous Once Sachdeep Paul   2 g at 06/26/17 1432    metoprolol (LOPRESSOR) injection 2 5 mg  2 5 mg Intravenous Q6H PRN [facility]        metoprolol (LOPRESSOR) injection 5 mg  5 mg Intravenous Once Evette Kaylene, DO   Stopped at 06/26/17 0138    metoprolol succinate (TOPROL-XL) 24 hr tablet 50 mg  50 mg Oral QAM Evette Kaylene, DO   50 mg at 06/26/17 0956    naproxen (NAPROSYN) tablet 250 mg  250 mg Oral BID With Meals Sachdee Paul        ondansetron (ZOFRAN) injection 4 mg  4 mg Intra[facility]ous Q[facility]H [facility]N [facility]        oxyCODONE-acetaminophen (PERCOCET) 5-325 mg per tablet 1 tablet  1 tablet Oral Q4H PRN Evette Kaylene, DO   1 tablet at 06/26/17 0958    oxyCODONE-acetaminophen (PERCOCET) 5-325 mg per tablet 2 tablet  2 tablet Oral Q6H PRN [facility]           Invasive Devices     Peripheral Intravenous Line            Peripheral IV 06/25/17 Left Hand less than 1 day                Lab, Imaging and other studies: I have personally reviewed pertinent reports      VTE Pharmacologic Prophylaxis: Enoxaparin (Lovenox)  VTE Mechanical Prophylaxis: sequential compression device    [employer] Jae Colon

## 2017-06-26 NOTE — PLAN OF CARE
DISCHARGE PLANNING     Discharge to home or other facility with appropriate resources Progressing        INFECTION - ADULT     Absence or prevention of progression during hospitalization Progressing        Knowledge Deficit     Patient/family/caregiver demonstrates understanding of disease process, treatment plan, medications, and discharge instructions Progressing        PAIN - ADULT     Verbalizes/displays adequate comfort level or baseline comfort level Progressing        Potential for Falls     Patient will remain free of falls Progressing        SAFETY ADULT     Maintain or return to baseline ADL function Progressing     Maintain or return mobility status to optimal level Progressing

## 2017-06-26 NOTE — SOCIAL WORK
DASH discussion completed  Discussed goals of making sure pt's needs are met upon discharge, pt's preferences are taken into account, pt understands her health condition, medications and symptoms to watch for after returning home and pt is aware of any follow up appointments recommended by hospital physician  SPOKE WITH PT AT THE BEDSIDE  PT LIVES ALONG AND HAS A ROLATOR AND WHEELCHAIR AT HOME BUT IS GENERALLY INDEPENDENT WITH HER OWN CARE  PT DOES DRIVE AND HAS NO HHC AT THIS TIME  PT WOULD LIKE INFORMATION ON [email or web address], SHE HAS PAAD THROUGH THE [facility] AND IS ON [employer] CARE 2ND TO HER INSURANCE  CM WILL CONTINUE TO FOLLOW FOR DCP NEEDS

## 2017-06-26 NOTE — CONSULTS
Orthopedics   [facility] 65 y o  female MRN: [id number]  Unit/Bed#: [facility] 306-01      Chief Complaint:   left knee pain    HPI:   65 y  o female complaining of left knee pain S/P fall recently  Pt states she had a trip and fall events with impact of the L knee into a hard object  Since that time she has had medial sided L knee pain  She notes occasional feeling of instability due to pain  Pt notes analgesics seem to make the pain better  Pt  Denies catching, locking, poping numbness or tingling in the L leg  Review Of Systems:   · Skin: Normal  · Neuro: See HPI  · Musculoskeletal: See HPI  · 14 point review of systems negative except as stated above     Past Medical History:   Past Medical History:   Diagnosis Date    Arthritis     DJD both knees    Diabetes mellitus     Fall with injury 02/15/2017    fell at home secondary to macular degeneration vision issues, neck pain currently    Fibromyalgia     had for many years  may have polymyalgia    Glaucoma     both eyes    H/O fibromyalgia     Herniated cervical disc     Hyperlipidemia     Hypertension     Liver disease     " fatty liver"    Lumbar herniated disc     with sciatica    Macular degeneration     both eyes, gets injections    Neuropathy     in the feet? falls on occ       Past Surgical History:   Past Surgical History:   Procedure Laterality Date    APPENDECTOMY      CHOLECYSTECTOMY      Lap    COLONOSCOPY      COLONOSCOPY N/A 2/21/2017    Procedure: COLONOSCOPY and biopsy, snare polypectomy;  Surgeon: Selene Baca MD;  Location: [place] [address] [facility] LAB; Service:     DILATION AND CURETTAGE, DIAGNOSTIC / THERAPEUTIC      ESOPHAGOGASTRODUODENOSCOPY N/A 2/21/2017    Procedure: ESOPHAGOGASTRODUODENOSCOPY (EGD) and biopsy ;  Surgeon: Seleen Baca MD;  Location: [place] [address] [facility] LAB;   Service:     HEMORRHOID SURGERY      in her 18's    HYSTERECTOMY      complete    OOPHORECTOMY Left     age 21 then hysterectomy    OK REMV CATARACT EXTRACAP,INSERT LENS Right 5/19/2016    Procedure: EXTRACTION EXTRACAPSULAR CATARACT PHACO INTRAOCULAR LENS (IOL); Surgeon: Mason Klein MD;  Location: [facility] MAIN OR;  Service: Ophthalmology    TONSILLECTOMY      age 24       Family History:  Family history reviewed and non-contributory  Family History   Problem Relation Age of Onset    Coronary artery disease Father     Cancer Father      esophagus, trach    Peripheral vascular disease Father     Heart disease Sister      MI, in her 62s    Cancer Brother      bowel CA exp age 52    Cancer Maternal Grandmother      bowel CA       Social History:  Social History     Social History    Marital status:      Spouse name: N/A    Number of children: N/A    Years of education: N/A     Social History Main Topics    Smoking status: Never Smoker    Smokeless tobacco: Never Used    Alcohol use Yes      Comment: occ    Drug use: No    Sexual activity: Not Asked     Other Topics Concern    None     Social History Narrative    None       Allergies:    Allergies   Allergen Reactions    Other Hives     Cat Scan Dye           Labs:    0  Lab Value Date/Time   HCT 37 7 06/26/2017 0543   HCT 40 8 06/25/2017 1750   HCT 40 2 02/07/2017 1446   HGB 12 7 06/26/2017 0543   HGB 14 0 06/25/2017 1750   HGB 13 5 02/07/2017 1446   INR 1 01 06/25/2017 1750   WBC 9 80 06/26/2017 0543   WBC 11 20 (H) 06/25/2017 1750   WBC 7 60 02/07/2017 1446       Meds:    Current Facility-Administered Medications:     atorvastatin (LIPITOR) tablet 40 mg, 40 mg, Oral, Daily With Dinner, Evette Kaylene, DO    enoxaparin (LOVENOX) subcutaneous injection 40 mg, 40 mg, Subcutaneous, Daily, Evette Kaylene, , 40 mg at 06/26/17 0956    insulin lispro (HumaLOG) 100 units/mL subcutaneous injection 1-5 Units, 1-5 Units, Subcutaneous, HS, Evette Kaylene, DO    insulin lispro (HumaLOG) 100 units/mL subcutaneous injection 1-6 Units, 1-6 Units, Subcutaneous, TID AC **AND** Fingerstick Glucose (POCT), , , TID AC, Evette Kaylene, DO    metoprolol (LOPRESSOR) injection 2 5 mg, 2 5 mg, Intravenous, Q6H PRN, Evette Kaylene, DO    metoprolol (LOPRESSOR) injection 5 mg, 5 mg, Intravenous, Once, Evette Kaylene, DO, Stopped at 06/26/17 0138    metoprolol succinate (TOPROL-XL) 24 hr tablet 50 mg, 50 mg, Oral, QAM, Evette Kaylene, DO, 50 mg at 06/26/17 0956    oxyCODONE-acetaminophen (PERCOCET) 5-325 mg per tablet 1 tablet, 1 tablet, Oral, Q4H PRN, Evette Kaylene, DO, 1 tablet at 06/26/17 0958    Blood Culture:   No results found for: BLOODCX    Wound Culture:   No results found for: WOUNDCULT    Ins and Outs:  No intake/output data recorded  Physical Exam:   /79   Pulse 88   Temp 98 °F (36 7 °C) (Tympanic)   Resp 18   Ht 5' 2" (1 575 m)   Wt 86 4 kg (190 lb 7 6 oz)   SpO2 99%   BMI 34 84 kg/m²   Gen: Alert and oriented to person, place, time  HEENT: EOMI, eyes clear, moist mucus membranes, hearing intact  Respiratory: Bilateral chest rise  No audible wheezing found  Cardiovascular: Regular Rate and Rhythm  Abdomen: soft nontender/nondistended  Musculoskeletal: left lower extremity  · Skin intact, no erythema, no effusion  · Tender to palpation over Medial joint line  · Stable to varus/valgus stress  · Negative lachmans, Posterior draw, Keri's test  · Sensation intact L1-S1  · 4/5 strength to hip flexion/extension, knee flexion/extension ankle dorsi/plantar flexion, EHL/FHL      Radiology:   I personally reviewed the films  X-rays left knee shows arthritis    _*_*_*_*_*_*_*_*_*_*_*_*_*_*_*_*_*_*_*_*_*_*_*_*_*_*_*_*_*_*_*_*_*_*_*_*_*_*_*_*_*    Assessment:  65 y  o female with left knee contusion and OA    Plan:   · Weight bearing as tolerated  left lower extremity  · PT  · Pain control  · Dispo: Ok for discharge from ortho perspective  · F/U OP if pain persists for consideration of injection vs  More advanced imaging    Tarsha Murillo MD

## 2017-06-27 ENCOUNTER — APPOINTMENT (INPATIENT)
Dept: PHYSICAL THERAPY | Facility: HOSPITAL | Age: 77
DRG: 605 | End: 2017-06-27
Payer: MEDICARE

## 2017-06-27 VITALS
WEIGHT: 190.48 LBS | TEMPERATURE: 98 F | BODY MASS INDEX: 35.05 KG/M2 | HEIGHT: 62 IN | DIASTOLIC BLOOD PRESSURE: 63 MMHG | SYSTOLIC BLOOD PRESSURE: 133 MMHG | HEART RATE: 66 BPM | OXYGEN SATURATION: 95 % | RESPIRATION RATE: 20 BRPM

## 2017-06-27 PROBLEM — R07.9 CHEST PAIN: Status: RESOLVED | Noted: 2017-06-25 | Resolved: 2017-06-27

## 2017-06-27 PROBLEM — R26.89 DECREASED MOBILITY: Status: ACTIVE | Noted: 2017-06-27

## 2017-06-27 LAB
ANION GAP SERPL CALCULATED.3IONS-SCNC: 11 MMOL/L (ref 4–13)
BASOPHILS # BLD AUTO: 0 THOUSANDS/ΜL (ref 0–0.1)
BASOPHILS NFR BLD AUTO: 1 % (ref 0–1)
BUN SERPL-MCNC: 13 MG/DL (ref 5–25)
CALCIUM SERPL-MCNC: 9.3 MG/DL (ref 8.3–10.1)
CHLORIDE SERPL-SCNC: 104 MMOL/L (ref 100–108)
CO2 SERPL-SCNC: 26 MMOL/L (ref 21–32)
CREAT SERPL-MCNC: 0.56 MG/DL (ref 0.6–1.3)
EOSINOPHIL # BLD AUTO: 0.3 THOUSAND/ΜL (ref 0–0.61)
EOSINOPHIL NFR BLD AUTO: 4 % (ref 0–6)
ERYTHROCYTE [DISTWIDTH] IN BLOOD BY AUTOMATED COUNT: 13.2 % (ref 11.6–15.1)
GFR SERPL CREATININE-BSD FRML MDRD: >60 ML/MIN/1.73SQ M
GLUCOSE SERPL-MCNC: 117 MG/DL (ref 65–140)
GLUCOSE SERPL-MCNC: 286 MG/DL (ref 65–140)
GLUCOSE SERPL-MCNC: 98 MG/DL (ref 65–140)
HCT VFR BLD AUTO: 38.5 % (ref 37–47)
HGB BLD-MCNC: 13.1 G/DL (ref 12–16)
LYMPHOCYTES # BLD AUTO: 2.3 THOUSANDS/ΜL (ref 0.6–4.47)
LYMPHOCYTES NFR BLD AUTO: 36 % (ref 14–44)
MAGNESIUM SERPL-MCNC: 1.8 MG/DL (ref 1.6–2.6)
MAX DIASTOLIC BP: 69 MMHG
MAX HEART RATE: 111 BPM
MAX PREDICTED HEART RATE: 142 BPM
MAX. SYSTOLIC BP: 163 MMHG
MCH RBC QN AUTO: 30.8 PG (ref 27–31)
MCHC RBC AUTO-ENTMCNC: 34.1 G/DL (ref 31.4–37.4)
MCV RBC AUTO: 90 FL (ref 82–98)
MONOCYTES # BLD AUTO: 0.5 THOUSAND/ΜL (ref 0.17–1.22)
MONOCYTES NFR BLD AUTO: 8 % (ref 4–12)
MRSA NOSE QL CULT: NORMAL
NEUTROPHILS # BLD AUTO: 3.3 THOUSANDS/ΜL (ref 1.85–7.62)
NEUTS SEG NFR BLD AUTO: 51 % (ref 43–75)
NRBC BLD AUTO-RTO: 0 /100 WBCS
PHOSPHATE SERPL-MCNC: 3.9 MG/DL (ref 2.3–4.1)
PLATELET # BLD AUTO: 173 THOUSANDS/UL (ref 130–400)
PMV BLD AUTO: 9.4 FL (ref 8.9–12.7)
POTASSIUM SERPL-SCNC: 3.5 MMOL/L (ref 3.5–5.3)
PROTOCOL NAME: NORMAL
RBC # BLD AUTO: 4.27 MILLION/UL (ref 4.2–5.4)
SODIUM SERPL-SCNC: 141 MMOL/L (ref 136–145)
TIME IN EXERCISE PHASE: 60 S
WBC # BLD AUTO: 6.4 THOUSAND/UL (ref 4.8–10.8)

## 2017-06-27 PROCEDURE — G8988 SELF CARE GOAL STATUS: HCPCS

## 2017-06-27 PROCEDURE — 97162 PT EVAL MOD COMPLEX 30 MIN: CPT

## 2017-06-27 PROCEDURE — 97166 OT EVAL MOD COMPLEX 45 MIN: CPT

## 2017-06-27 PROCEDURE — G8987 SELF CARE CURRENT STATUS: HCPCS

## 2017-06-27 PROCEDURE — 84100 ASSAY OF PHOSPHORUS: CPT | Performed by: FAMILY MEDICINE

## 2017-06-27 PROCEDURE — G8979 MOBILITY GOAL STATUS: HCPCS

## 2017-06-27 PROCEDURE — 85025 COMPLETE CBC W/AUTO DIFF WBC: CPT | Performed by: FAMILY MEDICINE

## 2017-06-27 PROCEDURE — G8978 MOBILITY CURRENT STATUS: HCPCS

## 2017-06-27 PROCEDURE — 82948 REAGENT STRIP/BLOOD GLUCOSE: CPT

## 2017-06-27 PROCEDURE — 80048 BASIC METABOLIC PNL TOTAL CA: CPT | Performed by: FAMILY MEDICINE

## 2017-06-27 PROCEDURE — 83735 ASSAY OF MAGNESIUM: CPT | Performed by: FAMILY MEDICINE

## 2017-06-27 RX ORDER — NAPROXEN 250 MG/1
250 TABLET ORAL 2 TIMES DAILY WITH MEALS
Qty: 21 TABLET | Refills: 0 | Status: SHIPPED | OUTPATIENT
Start: 2017-06-27 | End: 2017-09-05

## 2017-06-27 RX ADMIN — METOPROLOL SUCCINATE 50 MG: 50 TABLET, FILM COATED, EXTENDED RELEASE ORAL at 09:27

## 2017-06-27 RX ADMIN — NAPROXEN 250 MG: 500 TABLET ORAL at 17:38

## 2017-06-27 RX ADMIN — ENOXAPARIN SODIUM 40 MG: 40 INJECTION SUBCUTANEOUS at 09:28

## 2017-06-27 RX ADMIN — INSULIN LISPRO 4 UNITS: 100 INJECTION, SOLUTION INTRAVENOUS; SUBCUTANEOUS at 12:05

## 2017-06-27 RX ADMIN — ATORVASTATIN CALCIUM 40 MG: 40 TABLET, FILM COATED ORAL at 17:37

## 2017-06-27 RX ADMIN — NAPROXEN 250 MG: 500 TABLET ORAL at 09:27

## 2017-06-27 NOTE — SOCIAL WORK
SPOKE WITH PT AT THE BEDSIDE  PT LIVES ALONG AND HAS A ROLATOR AND WHEELCHAIR AT HOME BUT IS GENERALLY INDEPENDENT WITH HER OWN CARE  PT DOES DRIVE AND HAS NO HHC AT THIS TIME  PT WOULD LIKE INFORMATION ON LIFELINE, SHE HAS PAAD THROUGH THE [facility] AND IS ON [employer] CARE 2ND TO HER INSURANCE  CM WILL CONTINUE TO FOLLOW FOR DCP NEEDS     PT PROVIDED NUMBERS FOR MOW AND LIFELINE

## 2017-06-27 NOTE — DISCHARGE SUMMARY
[facility] Discharge Summary - Medical Chiquita Stevens 65 y o  female MRN: [id number]    [facility] [address] / Bed: 2 [facility] 306/3 [place] Encounter: [id number]    BRIEF OVERVIEW  Admitting Provider: Kali Christy MD  Discharge Provider: Kali Christy MD    Discharge To: Home self care with VNA services   Facility: N/A    Outpatient Follow-Up: [facility]  Things to address at first follow up visit: Left knee pain due to arthritis  XR hip shows mild arthritis bilaterally, no acute osseous abnormality of left knee  Physical therapy referral and orthopedic referral if worsening pain for consideration of injection and/or more advanced imaging  Blood pressure elevated at times during mid-day evaluate BP in office  HbA1c 7 6 still not at goal on metformin  Labs and results pending at discharge: none     Admission Date: 6/25/2017     Discharge Date: 6/27/16    Primary Discharge Diagnosis  Principal Problem (Resolved):    Chest pain  Active Problems:    Back pain    Left knee pain    Fibromyalgia    Non-insulin dependent type 2 diabetes mellitus    IBS (irritable bowel syndrome)    Urinary incontinence    Hyperlipidemia    Hypertension    Decreased mobility      Secondary Discharge Diagnoses  none  Consulting Providers   Cardiology   [facility]  23  STAY    Procedures Performed/Pertinent Test results  Troponin: <0 02 x2  Lipid Panel: Total Chol 200, TGs 180, HDL 59,   HbA1C: 7 6    Xr Chest Pa & Lateral    Result Date: 6/26/2017  Impression: No active pulmonary disease  Cardiomegaly  Workstation performed: [id number]     Xr Knee 4+ Views Left Injury    Result Date: 6/25/2017  Impression: No acute osseous abnormality  Workstation performed: [id number]     Xr Hips Bilateral 5+ W Pelvis If Performed    Result Date: 6/25/2017  Impression: No evidence of acute traumatic injury to the pelvis and bilateral hips  There is mild osteoarthritis of both hips  Workstation performed: [id number]     NM stress: Summary  -  Stress results: There was no chest pain during stress  -  ECG conclusions: The stress ECG was equivocal for ischemia  -  Perfusion imaging: There was a small, reversible myocardial perfusion defect of the apical wall likely due to low counts  -  Gated SPECT: The calculated left ventricular ejection fraction was 75 %  There was no left ventricular regional abnormality   -  Impressions and recommendations: Normal study after pharmacologic vasodilation      IMPRESSIONS: Normal study after pharmacologic vasodilation  Myocardial perfusion imaging was normal at rest and with stress          HPI  H&P per admitting Team  66 yo f with PMH of fibromyalgia, IBS, HTN, HLD who presents today with L  Knee pain x3 days and L  Sided CP  Pt fell 3 days ago, as she was walking - reported that her knees gave away, and she ended up landing on her buttocks with her legs pinned under her, and since then she has had pain in her  L  Knee pain, can't stand on it, can only walk with a walker - started to use a walker after the fall  No LOC, no head injury  Reports she has been falling since this year, she has had 2 falls this year  Pt also reports that she has had increasing weakness in her RLE x1-2 months, reason for the fall      L  Sided chest pain: intermittent episodes x 3-4 months, sharp pain, 10/10, episodes last 2 minutes,  happens often (pt unable to quantify frequency), triggered regardless of activity, nothing makes it better or worse, self resolves, not tried anything for the pain  No hx of MI, never seen a cardiologist, no cardiac hx  No associated SOB or N/V  Hospital Course  Chest pain: Patient presented with Left sided chest pain  Troponin taken negative x2  EKG was NSR, patient was placed on telemetry, Cardiology was consulted, Morphine 1mg q4hr for pain  Chest pain resolved   Patient has multiple RF for ACS, stress test performed and was resulted normal study  ACS ruled out  Lipid panel taken  Patient to follow up with cardiology  Acute on chronic Left knee pain: Fall precaution, XR left knee showed arthritis no fracture, PT/OT was ordered, Orthopedic surgery was consulted recommended continued PT/OT OOB as tolerated  Follow up in clinic for worsening condition for further treatment options  Morphine 1mg q4hr PRN for severe pain, percocet for moderate pain  Pain initially did not resolve  Naproxen was added  Patient responded well  PT/OT recommended home with services  All other chronic issues were treated in hospital as stable conditions  Patient was discharged with good pain control  Physical Exam at Discharge  General: Pt is AAO x 3, not in any acute distress  Cardio: RRR, S1 and S2 +, no murmurs/rubs/gallops/clicks  Resp: CTA b/l, no wheezes/rales/rhonchi  Abdomen: soft, NT/ND, BS+  Extremities: Tenderness to palpation posterior aspect of left knee, no cyst or mass, Knee flexion to 60+ degrees, Full left knee extension, strength 5/5 bilaterally  no cyanosis or edema  PP 2+ b/l  Medications    amitriptyline 25 mg tablet   Commonly known as: ELAVIL   Take 25 mg by mouth daily at bedtime   Refills: 0     ascorbic acid 500 mg tablet   Commonly known as: VITAMIN C   Take 500 mg by mouth 2 (two) times a day  Refills: 0     bimatoprost 0 01 % ophthalmic drops   Commonly known as: LUMIGAN   Administer 1 drop to the right eye daily at bedtime  Refills: 0     FISH OIL PO   Take 1,000 mg by mouth 2 (two) times a day  Refills: 0     metFORMIN 850 mg tablet   Commonly known as: GLUCOPHAGE   Take 850 mg by mouth 2 (two) times a day with meals  Refills: 0     metoprolol succinate 50 mg 24 hr tablet   Commonly known as: TOPROL-XL   Take 50 mg by mouth every morning     Refills: 0     naproxen 250 mg tablet   Commonly known as: NAPROSYN   Take 1 tablet by mouth 2 (two) times a day with meals   Refills: 0     oxyCODONE-acetaminophen  mg per tablet Commonly known as: PERCOCET   Take 1 tablet by mouth every 6 (six) hours as needed for severe pain   Refills: 0     pregabalin 75 mg capsule   Commonly known as: LYRICA   Take 75 mg by mouth 2 (two) times a day   Refills: 0     PRESERVISION AREDS 2 PO   Take by mouth 2 (two) times a day  Refills: 0     rosuvastatin 20 MG tablet   Commonly known as: CRESTOR   Take 20 mg by mouth daily at bedtime  Refills: 0           Allergies  Allergies   Allergen Reactions    Other Hives     Cat Scan Dye       Diet restrictions: ADA  Activity restrictions: none  Code Status: Level 3 - DNAR and DNI  Advance Directive and Living Will: Received    Discharge Condition: good      Discharge  Statement   I spent 30 minutes discharging the patient  This time was spent on the day of discharge  I had direct contact with the patient on the day of discharge  Additional documentation is required if more than 30 minutes were spent on discharge

## 2017-06-27 NOTE — PLAN OF CARE
DISCHARGE PLANNING     Discharge to home or other facility with appropriate resources Progressing        DISCHARGE PLANNING - CARE MANAGEMENT     Discharge to post-acute care or home with appropriate resources Progressing        INFECTION - ADULT     Absence or prevention of progression during hospitalization Progressing        Knowledge Deficit     Patient/family/caregiver demonstrates understanding of disease process, treatment plan, medications, and discharge instructions Progressing        PAIN - ADULT     Verbalizes/displays adequate comfort level or baseline comfort level Progressing        Potential for Falls     Patient will remain free of falls Progressing        SAFETY ADULT     Maintain or return to baseline ADL function Progressing     Maintain or return mobility status to optimal level Progressing

## 2017-06-27 NOTE — OCCUPATIONAL THERAPY NOTE
OT EVALUATION     06/27/17 1025   Restrictions/Precautions   Other Precautions Fall Risk; Chair Alarm   Pain Assessment   Pain Assessment No/denies pain   Home Living   Type of [address] One level  (1 [place]ANIA)   Home Equipment Cane  (rollator )   Additional Comments patient reports she sponge bathes  Patient reports falls from doing something "stupid", niece assists at times but pt reports independent with ADLS and IADLS  Patient reports an interest in meals on wheels   Prior Function   Level of [place] Independent with ADLs and functional mobility   Lives With Alone   Receives Help From (niece )   ADL   Eating Assistance 7  Independent   Grooming Assistance 7  Independent   UB Bathing Assistance 7  Independent   LB Bathing Assistance 5  Supervision/Setup   UB Dressing Assistance 7  Independent    [phone]  5  Supervision/Setup   Bed Mobility   Supine to Sit 7  Independent   Transfers   Sit to Stand 5  Supervision   Stand to Sit 5  Supervision   Functional Mobility   Functional Mobility 5  Supervision   Additional Comments 100 feet   Additional items Rolling walker   Balance   Static Sitting Good   Dynamic Sitting Fair +   Static Standing Fair   Dynamic Standing Fair   Activity Tolerance   Activity Tolerance Patient tolerated treatment well   RUE Assessment   RUE Assessment WFL  (4-/5)   LUE Assessment   LUE Assessment WFL  (4-/5)   Cognition   Overall Cognitive Status WFL   Arousal/Participation Cooperative   Attention Within functional limits   Orientation Level Oriented X4   Following Commands Follows all commands and directions without difficulty   Assessment   Limitation Decreased ADL status; Decreased UE strength;Decreased Safe judgement during ADL;Decreased endurance;Decreased self-care trans;Decreased high-level ADLs  (decreased balance and mobility )   Prognosis Good   Assessment Patient evaluated by Occupational Therapy    Patient admitted with Chest pain  The patients occupational profile, medical and therapy history includes a extensive additional review of physical, cognitive, or psychosocial history related to current functional performance  Comorbidities affecting functional mobility and ADLS include: urinary incontinence, fibromyalgia, arthritis, HTN, macular degeneration, herniated lumbar disk with sciatica  Prior to admission, patient was independent with functional mobility with walker/cane at times, independent with ADLS and independent with IADLS  The evaluation identifies the following performance deficits: weakness, impaired balance, decreased endurance, increased fall risk, new onset of impairment of functional mobility, decreased ADLS, decreased IADLS, decreased activity tolerance, decreased safety awareness and decreased strength, that result in activity limitations and/or participation restrictions  This evaluation requires clinical decision making of moderate complexity, because the patient presents with comorbidites that affect occupational performance and required minimal to moderate modification of tasks or assistance with consideration of several treatment options  The Barthel Index was used as a functional outcome tool presenting with a score of 65, indicating moderate limitations of functional mobility and ADLS  Patient will benefit from skilled Occupational Therapy services to address above deficits and facilitate a safe return to prior level of function  Goals   Patient Goals go home   STG Time Frame (1-7 days)   Short Term Goal  Patient will increase standing tolerance to 3 minutes during functional activity; Patient will increase functional mobility to and from bathroom with rolling walker independently to increase performance with ADLS; Patient will tolerate 8 minutes of UE ROM/strengthening to increase general activity tolerance and performance in ADLS/IADLS     LTG Time Frame (8-14 days)   Long Term Goal Patient will increase standing tolerance to 6 minutes during functional activity; Patient will tolerate 12 minutes of UE ROM/strengthening to increase general activity tolerance and performance in ADLS/IADLS  Functional Transfer Goals   Pt Will Perform All Functional Transfers (STG independent )   ADL Goals   Pt Will Perform Bathing (LTG independent )   Pt Will Perform LE Dressing (STG independent )   Pt Will Perform Toileting (STG independent )   Plan   Treatment Interventions ADL retraining;Functional transfer training;UE strengthening/ROM; Endurance training;Patient/family training;Equipment evaluation/education; Activityengagement   OT Frequency 3-5x/wk   Recommendation   Discharge Recommendation Home with family support   Equipment Recommended (rolling walker )   Barthel Index   Feeding 10   Bathing 0   Grooming Score 5   Dressing Score 5   Bladder Score 5   Bowels Score 10   Toilet Use Score 5   Transfers (Bed/Chair) Score 10   Mobility (Level Surface) Score 10   Stairs Score 5   Barthel Index Score 65

## 2017-06-27 NOTE — PHYSICIAN ADVISOR
Current patient class: Inpatient  The patient is currently on Hospital Day: 3      The patient was admitted to the hospital at [facility] [address] on 6/25/17 for the following diagnosis:  Back pain [M54 9]  Chest pain [R07 9]  Left knee pain [M25 562]  Frequent falls [R29 6]       There is documentation in the medical record of an expected length of stay of at least 2 midnights  The patient is therefore expected to satisfy the 2 midnight benchmark and given the 2 midnight presumption is appropriate for INPATIENT ADMISSION  Given this expectation of a satisfying stay, CMS instructs us that the patient is most often appropriate for inpatient admission under part A provided medical necessity is documented in the chart  After review of the relevant documentation, labs, vital signs and test results, the patient is appropriate for INPATIENT ADMISSION  Admission to the hospital as an inpatient is a complex decision making process which requires the practitioner to consider the patients presenting complaint, history and physical examination and all relevant testing  With this in mind, in this case, the patient was deemed appropriate for INPATIENT ADMISSION  After review of the documentation and testing available at the time of the admission I concur with this clinical determination of medical necessity  Rationale is as follows: The patient is a 65 yrs old Female who presented to the ED at 6/25/2017  5:16 PM with a chief complaint of Fall (pt has back problems and legs give out  a lot,states she fell 2 days ago when she did not lift her foot high enough,c/o left arm pain,having headache today)      Patient continues to remain hospitalized the present time, with chest pain, hypertension, history of fibromyalgia, recent falls  Patient continues to remain hospitalized at the present time, left lower leg weakness, persistent left-sided chest pain   The patient had had an equivocal stress test, and will require further hospitalization for stabilization and further evaluation  The patient is therefore appropriate for INPATIENT ADMISSION  The patients vitals on arrival were ED Triage Vitals   Temperature Pulse Respirations Blood Pressure SpO2   06/25/17 1653 06/25/17 1653 06/25/17 1653 06/25/17 1653 06/25/17 1653   98 2 °F (36 8 °C) 80 18 (!) 185/100 97 %      Temp Source Heart Rate Source Patient Position BP Location FiO2 (%)   06/25/17 2026 06/25/17 1901 06/25/17 1653 06/25/17 1653 --   Tympanic Monitor Sitting Left arm       Pain Score       06/25/17 1653       6           Past Medical History:   Diagnosis Date    Arthritis     DJD both knees    Diabetes mellitus     Fall with injury 02/15/2017    fell at home secondary to macular degeneration vision issues, neck pain currently    Fibromyalgia     had for many years  may have polymyalgia    Glaucoma     both eyes    H/O fibromyalgia     Herniated cervical disc     Hyperlipidemia     Hypertension     Liver disease     " fatty liver"    Lumbar herniated disc     with sciatica    Macular degeneration     both eyes, gets injections    Neuropathy     in the feet? falls on occ     Past Surgical History:   Procedure Laterality Date    APPENDECTOMY      CHOLECYSTECTOMY      Lap    COLONOSCOPY      COLONOSCOPY N/A 2/21/2017    Procedure: COLONOSCOPY and biopsy, snare polypectomy;  Surgeon: Linda Mathews MD;  Location: [place] [address] [facility]; Service:     DILATION AND CURETTAGE, DIAGNOSTIC / THERAPEUTIC      ESOPHAGOGASTRODUODENOSCOPY N/A 2/21/2017    Procedure: ESOPHAGOGASTRODUODENOSCOPY (EGD) and biopsy ;  Surgeon: Linda Mathews MD;  Location: [place] [address] [facility]; Service:     HEMORRHOID SURGERY      in her 18's    HYSTERECTOMY      complete    OOPHORECTOMY Left     age 21 then hysterectomy    CT REMV CATARACT EXTRACAP,INSERT LENS Right 5/19/2016    Procedure: EXTRACTION EXTRACAPSULAR CATARACT PHACO INTRAOCULAR LENS (IOL);   Surgeon: Stan Wallis MD;  Location[facility] [place] [facility];  Service: Ophthalmology    TONSILLECTOMY      age 24           Consults have been placed to:   IP CONSULT TO CARDIOLOGY  IP CONSULT TO ORTHOPEDIC SURGERY    Vitals:    06/25/17 2026 06/26/17 0955 06/26/17 1434 06/26/17 1944   BP: 166/72 147/79 (!) 172/72 (!) 173/79   Pulse: 70 88 80 73   Resp: 18 18 18 18   Temp: 98 5 °F (36 9 °C) 98 °F (36 7 °C) 98 6 °F (37 °C) (!) 97 3 °F (36 3 °C)   TempSrc: Tympanic Tympanic Tympanic Tympanic   SpO2: 98% 99% 98% 95%   Weight: 86 4 kg (190 lb 7 6 oz)      Height: 5' 2" (1 575 m)          Most recent labs:    Recent Labs      06/25/17   1750  06/26/17   0543   WBC  11 20*  9 80   HGB  14 0  12 7   HCT  40 8  37 7   PLT  191  175   K  4 3  3 6   NA  140  143   CALCIUM  10 0  9 2   BUN  16  11   CREATININE  0 59*  0 56*   INR  1 01   --    TROPONINI  <0 02  <0 02   AST  26   --    ALT  38   --    ALKPHOS  91   --    BILITOT  0 50   --        Scheduled Meds:  atorvastatin 40 mg Oral Daily With Dinner   enoxaparin 40 mg Subcutaneous Daily   insulin lispro 1-5 Units Subcutaneous HS   insulin lispro 1-6 Units Subcutaneous TID AC   metoprolol 5 mg Intravenous Once   metoprolol succinate 50 mg Oral QAM   naproxen 250 mg Oral BID With Meals     Continuous Infusions:   PRN Meds: metoprolol    ondansetron    oxyCODONE-acetaminophen    oxyCODONE-acetaminophen    Surgical procedures (if appropriate):

## 2017-06-27 NOTE — DISCHARGE INSTRUCTIONS
Chest Pain   WHAT YOU NEED TO KNOW:   What causes chest pain? Chest pain can be caused by a range of conditions, from not serious to life-threatening  Chest pain can be a symptom of a digestive problem, such as acid reflux or a stomach ulcer  An anxiety attack or a strong emotion, such as anger, can also cause chest pain  Infection, inflammation, or a fracture in the bones or cartilage in your chest can cause pain or discomfort  Sometimes chest pain or pressure is caused by poor blood flow to your heart (angina)  Chest pain may also be caused by life-threatening conditions such as a heart attack or blood clot in your lungs  What other symptoms might I have with chest pain? · A burning feeling behind your breastbone    · A racing or slow heartbeat     · Fever or sweating     · Nausea or vomiting     · Shortness of breath     · Discomfort or pressure that spreads from your chest to your back, jaw, or arm     · Feeling weak, tired, or faint  How is the cause of chest pain diagnosed? Your healthcare provider will examine you  Describe your chest pain in as much detail as possible  Tell him or her where your pain is and when it began  Tell the provider if you notice anything that makes the pain worse or better  Tell him or her if it is constant or comes and goes  Your healthcare provider will ask about any medicines you use and medical conditions you have  He or she will also examine you  You may also need any of the following tests:  · An EKG  is a test that records your heart's electrical activity  · Blood tests  check for heart damage and signs of a heart attack  · An echocardiogram  uses sound waves to see if blood is flowing normally through your heart  · An ultrasound, x-ray, CT, or MRI scan  may show the cause of your chest pain  You may be given contrast liquid to help your heart show up better in the pictures   Tell the healthcare provider if you have ever had an allergic reaction to contrast liquid  Do not enter the MRI room with anything metal  Metal can cause serious injury  Tell the healthcare provider if you have any metal in or on your body  · An endoscopy  may be done to check for ulcers or problem with your esophagus  How is chest pain treated? Medicines may be given to treat the cause of your chest pain  Examples include pain medicine, anxiety medicine, or medicines to increase blood flow to your heart  Do not  take certain medicines without asking your healthcare provider first  These include NSAIDs, herbal or vitamin supplements, or hormones (estrogen or progestin)  What are some healthy living tips? The following are general healthy guidelines  If your chest pain is caused by a heart problem, your healthcare provider will give you specific guidelines to follow  · Do not smoke  Nicotine and other chemicals in cigarettes and cigars can cause lung and heart damage  Ask your healthcare provider for information if you currently smoke and need help to quit  E-cigarettes or smokeless tobacco still contain nicotine  Talk to your healthcare provider before you use these products  · Eat a variety of healthy, low-fat foods  Healthy foods include fruits, vegetables, whole-grain breads, low-fat dairy products, beans, lean meats, and fish  Ask for more information about a heart healthy diet  · Ask about activity  Your healthcare provider will tell you which activities to limit or avoid  Ask when you can drive, return to work, and have sex  Ask about the best exercise plan for you  · Maintain a healthy weight  Ask your healthcare provider how much you should weigh  Ask him or her to help you create a weight loss plan if you are overweight    Call 911 if:   · You have any of the following signs of a heart attack:      ¨ Squeezing, pressure, or pain in your chest that lasts longer than 5 minutes or returns    ¨ Discomfort or pain in your back, neck, jaw, stomach, or arm ¨ Trouble breathing    ¨ Nausea or vomiting    ¨ Lightheadedness or a sudden cold sweat, especially with chest pain or trouble breathing    When should I seek immediate care? · You have chest discomfort that gets worse, even with medicine  · You cough or vomit blood  · Your bowel movements are black or bloody  · You cannot stop vomiting, or it hurts to swallow  When should I contact my healthcare provider? · You have questions or concerns about your condition or care  CARE AGREEMENT:   You have the right to help plan your care  Learn about your health condition and how it may be treated  Discuss treatment options with your caregivers to decide what care you want to receive  You always have the right to refuse treatment  The above information is an  only  It is not intended as medical advice for individual conditions or treatments  Talk to your doctor, nurse or pharmacist before following any medical regimen to see if it is safe and effective for you  © 2017 [employer] Information is for End User's use only and may not be sold, redistributed or otherwise used for commercial purposes  All illustrations and images included in CareNotes® are the copyrighted property of [employer]  or Arthur Carreno  Chronic Back Pain   AMBULATORY CARE:   Chronic back pain  is back pain that lasts 3 months or longer  This may include pain that has not been controlled or does not improve with treatment  Your back pain may cause weakness or pain that spreads to your arms or legs  Seek immediate care for the following symptoms:   · Severe pain    · New signs of numbness or weakness, especially in your lower back, legs, arms, or genital area    · Unable to control of your bladder or bowel movements    · A fever or sudden weight loss  Contact your healthcare provider if:   · You have new or worsened pain      · You have questions or concerns about your condition or care   Treatment for chronic back pain  may include any of the following:  · NSAIDs , such as ibuprofen, help decrease swelling, pain, and fever  This medicine is available with or without a doctor's order  NSAIDs can cause stomach bleeding or kidney problems in certain people  If you take blood thinner medicine, always ask if NSAIDs are safe for you  Always read the medicine label and follow directions  Do not give these medicines to children under 10months of age without direction from your child's healthcare provider  · Acetaminophen  decreases pain  It is available without a doctor's order  Ask how much to take and how often to take it  Follow directions  Acetaminophen can cause liver damage if not taken correctly  · Prescription pain medicine  may be given  Ask how to take this medicine safely  · Muscle relaxers  help decrease muscle spasms and back pain  · Take your medicine as directed  Contact your healthcare provider if you think your medicine is not helping or if you have side effects  Tell him if you are allergic to any medicine  Keep a list of the medicines, vitamins, and herbs you take  Include the amounts, and when and why you take them  Bring the list or the pill bottles to follow-up visits  Carry your medicine list with you in case of an emergency  Manage your chronic back pain:   · Apply heat  on your back for 20 to 30 minutes every 2 hours for as many days as directed  Heat helps decrease pain and muscle spasms  · Stay active  as much as you can without causing more pain  Ask your healthcare provider what exercises are right for you  Do not sit or lie down for long periods  This could make your back pain worse  Avoid heavy lifting until your pain is gone  · Go to physical therapy as directed  A physical therapist teaches you exercises to help improve movement and strength, and to decrease pain  Follow up with your healthcare provider as directed:   You may be referred to a sports medicine or spine specialist  Write down your questions so you remember to ask them during your visits  © 2017 [employer] Information is for End User's use only and may not be sold, redistributed or otherwise used for commercial purposes  All illustrations and images included in CareNotes® are the copyrighted property of [employer]  or Arthur Carreno  The above information is an  only  It is not intended as medical advice for individual conditions or treatments  Talk to your doctor, nurse or pharmacist before following any medical regimen to see if it is safe and effective for you

## 2017-06-27 NOTE — PHYSICAL THERAPY NOTE
PT EVALUATION     06/27/17 1407   Pain Assessment   Pain Assessment No/denies pain   Home Living   Type of Home Apartment  ([address])   Home Layout One level   Bathroom Shower/Tub Tub/shower unit   [employer] Grab bars in [facility] (rollator)   Additional Comments Pt reports she sponge bathes but has difficulty reaching her back  Prior Function   Level of [place] Independent with ADLs and functional mobility; Needs assistance with IADLs  (pt normally ambulates without an AD but would like a RW)   Lives With Alone   Receives Help From (Niece)   Comments Pt does drive; Pt would like a RW for home use and PT agrees pt would benefit from a RW for home use  Restrictions/Precautions   Other Precautions Fall Risk; Chair Alarm; Bed Alarm   General   Additional Pertinent History Pt's chart reviewed  Pt adm with chest pain and left knee pain  Family/Caregiver Present Yes  (pt's niece)   Cognition   Overall Cognitive Status WFL   Arousal/Participation Cooperative   Attention Within functional limits   Orientation Level Oriented X4   Following Commands Follows all commands and directions without difficulty   RLE Assessment   RLE Assessment WFL  (3+/5)   LLE Assessment   LLE Assessment WFL  (3+/5)   Light Touch   RLE Light Touch Grossly intact   LLE Light Touch Grossly intact   Transfers   Sit to Stand 5  Supervision   Stand to Sit 5  Supervision   Ambulation/Elevation   Gait Assistance 5  Supervision   Assistive Device Rolling walker   Distance 100 feet   Balance   Static Sitting Good   Static Standing Fair +  (with RW)   Dynamic Standing Fair +  (with RW)   Assessment   Prognosis Good   Problem List Decreased strength;Decreased range of motion;Decreased endurance; Impaired balance;Decreased mobility; Decreased coordination   Assessment Patient seen for Physical Therapy evaluation  Patient admitted with Chest pain    Comorbidities affecting patient's physical performance include: Fibromyalgia and arthritis  Personal factors affecting patient at time of initial evaluation include: lives in one story house, inability to navigate level surfaces without external assistance, limited home support, positive fall history, inability to perform ADLS and inability to perform IADLS   Prior to admission, patient was independent with functional mobility without assistive device, independent with ADLS, requiring assist for IADLS, living alone in one story home with 1 steps to enter and ambulating household distance  Please find objective findings from Physical Therapy assessment regarding body systems outlined above with impairments and limitations including weakness, decreased ROM, impaired balance, decreased endurance, impaired coordination, decreased activity tolerance, decreased functional mobility tolerance, decreased safety awareness and fall risk  The Barthel Index was used as a functional outcome tool presenting with a score of 70 today indicating moderate limitations of functional mobility and ADLS  Patient's clinical presentation is currently evolving as seen in patient's presentation of vital sign response, increased fall risk, new onset of impairment of functional mobility, decreased endurance and new onset of weakness  Pt would benefit from continued Physical Therapy treatment to address deficits as defined above and maximize level of functional mobility  As demonstrated by objective findings, the assigned level of complexity for this evaluation is moderate     Goals   Patient Goals Home   STG Expiration Date (1-7 days)   Short Term Goal #1 bed mob - I; trans - I   Short Term Goal #2 amb with RW x 150 feet - I; up/down 1 step - I   LTG Expiration Date (8-14 days)   Long Term Goal #1 Independent functional mobility home/community, as previous, with use of a RW, balance with Rw - G; strength LEs  - 4/5   Plan   Treatment/Interventions ADL retraining;Functional transfer training;LE strengthening/ROM; Therapeutic exercise; Endurance training;Patient/family training;Bed mobility;Gait training   PT Frequency (3-5x/wk)   Recommendation   Recommendation (Home with services as needed)   Equipment Recommended (Pt will need a RW for home use)   Additional Comments Message left for Niko in CM to order RW for pt for home use     Barthel Index   Feeding 10   Bathing 0   Grooming Score 5   Dressing Score 5   Bladder Score 10   Bowels Score 10   Toilet Use Score 5   Transfers (Bed/Chair) Score 10   Mobility (Level Surface) Score 10   Stairs Score 5   Barthel Index Score 70

## 2017-06-28 NOTE — SOCIAL WORK
[facility], WILL PICK PT UP FOR NURSING AND PT/OT, DC INSTRUCTIONS SENT DIRECTLY TO [facility] MA[facility]

## 2017-06-30 LAB
ATRIAL RATE: 73 BPM
P AXIS: 26 DEGREES
PR INTERVAL: 182 MS
QRS AXIS: 4 DEGREES
QRSD INTERVAL: 88 MS
QT INTERVAL: 370 MS
QTC INTERVAL: 407 MS
T WAVE AXIS: 52 DEGREES
VENTRICULAR RATE: 73 BPM

## 2017-07-06 ENCOUNTER — ALLSCRIPTS OFFICE VISIT (OUTPATIENT)
Dept: OTHER | Facility: OTHER | Age: 77
End: 2017-07-06

## 2017-07-06 ENCOUNTER — APPOINTMENT (OUTPATIENT)
Dept: RADIOLOGY | Facility: CLINIC | Age: 77
End: 2017-07-06
Payer: MEDICARE

## 2017-07-06 DIAGNOSIS — R29.6 REPEATED FALLS: ICD-10-CM

## 2017-07-06 DIAGNOSIS — M17.0 PRIMARY OSTEOARTHRITIS OF BOTH KNEES: ICD-10-CM

## 2017-07-06 DIAGNOSIS — M25.561 PAIN IN RIGHT KNEE: ICD-10-CM

## 2017-07-06 PROCEDURE — 73562 X-RAY EXAM OF KNEE 3: CPT

## 2017-07-07 ENCOUNTER — ALLSCRIPTS OFFICE VISIT (OUTPATIENT)
Dept: OTHER | Facility: OTHER | Age: 77
End: 2017-07-07

## 2017-07-25 ENCOUNTER — APPOINTMENT (OUTPATIENT)
Dept: PHYSICAL THERAPY | Facility: CLINIC | Age: 77
End: 2017-07-25
Payer: COMMERCIAL

## 2017-07-25 DIAGNOSIS — M17.0 PRIMARY OSTEOARTHRITIS OF BOTH KNEES: ICD-10-CM

## 2017-07-25 PROCEDURE — 97163 PT EVAL HIGH COMPLEX 45 MIN: CPT

## 2017-07-25 PROCEDURE — G8979 MOBILITY GOAL STATUS: HCPCS

## 2017-07-25 PROCEDURE — G8978 MOBILITY CURRENT STATUS: HCPCS

## 2017-07-31 ENCOUNTER — GENERIC CONVERSION - ENCOUNTER (OUTPATIENT)
Dept: OTHER | Facility: OTHER | Age: 77
End: 2017-07-31

## 2017-08-01 ENCOUNTER — APPOINTMENT (OUTPATIENT)
Dept: PHYSICAL THERAPY | Facility: CLINIC | Age: 77
End: 2017-08-01
Payer: SUBSIDIZED

## 2017-08-01 PROCEDURE — 97112 NEUROMUSCULAR REEDUCATION: CPT

## 2017-08-01 PROCEDURE — 97110 THERAPEUTIC EXERCISES: CPT

## 2017-08-07 DIAGNOSIS — Z12.31 ENCOUNTER FOR SCREENING MAMMOGRAM FOR MALIGNANT NEOPLASM OF BREAST: ICD-10-CM

## 2017-08-08 ENCOUNTER — APPOINTMENT (OUTPATIENT)
Dept: PHYSICAL THERAPY | Facility: CLINIC | Age: 77
End: 2017-08-08
Payer: SUBSIDIZED

## 2017-08-15 ENCOUNTER — APPOINTMENT (OUTPATIENT)
Dept: PHYSICAL THERAPY | Facility: CLINIC | Age: 77
End: 2017-08-15
Payer: SUBSIDIZED

## 2017-08-15 PROCEDURE — 97110 THERAPEUTIC EXERCISES: CPT

## 2017-08-15 PROCEDURE — 97112 NEUROMUSCULAR REEDUCATION: CPT

## 2017-08-17 ENCOUNTER — ALLSCRIPTS OFFICE VISIT (OUTPATIENT)
Dept: OTHER | Facility: OTHER | Age: 77
End: 2017-08-17

## 2017-08-22 ENCOUNTER — APPOINTMENT (OUTPATIENT)
Dept: PHYSICAL THERAPY | Facility: CLINIC | Age: 77
End: 2017-08-22
Payer: SUBSIDIZED

## 2017-08-22 PROCEDURE — 97112 NEUROMUSCULAR REEDUCATION: CPT

## 2017-08-22 PROCEDURE — G8979 MOBILITY GOAL STATUS: HCPCS

## 2017-08-22 PROCEDURE — 97110 THERAPEUTIC EXERCISES: CPT

## 2017-08-22 PROCEDURE — G8978 MOBILITY CURRENT STATUS: HCPCS

## 2017-08-29 ENCOUNTER — APPOINTMENT (OUTPATIENT)
Dept: PHYSICAL THERAPY | Facility: CLINIC | Age: 77
End: 2017-08-29
Payer: SUBSIDIZED

## 2017-09-01 ENCOUNTER — APPOINTMENT (OUTPATIENT)
Dept: PHYSICAL THERAPY | Facility: CLINIC | Age: 77
End: 2017-09-01
Payer: MEDICARE

## 2017-09-01 PROCEDURE — 97110 THERAPEUTIC EXERCISES: CPT

## 2017-09-01 PROCEDURE — 97112 NEUROMUSCULAR REEDUCATION: CPT

## 2017-09-05 ENCOUNTER — APPOINTMENT (EMERGENCY)
Dept: RADIOLOGY | Facility: HOSPITAL | Age: 77
End: 2017-09-05
Payer: MEDICARE

## 2017-09-05 ENCOUNTER — HOSPITAL ENCOUNTER (EMERGENCY)
Facility: HOSPITAL | Age: 77
Discharge: HOME | End: 2017-09-05
Attending: EMERGENCY MEDICINE | Admitting: EMERGENCY MEDICINE
Payer: MEDICARE

## 2017-09-05 ENCOUNTER — APPOINTMENT (OUTPATIENT)
Dept: PHYSICAL THERAPY | Facility: CLINIC | Age: 77
End: 2017-09-05
Payer: MEDICARE

## 2017-09-05 ENCOUNTER — GENERIC CONVERSION - ENCOUNTER (OUTPATIENT)
Dept: OTHER | Facility: OTHER | Age: 77
End: 2017-09-05

## 2017-09-05 VITALS
RESPIRATION RATE: 20 BRPM | HEIGHT: 62 IN | BODY MASS INDEX: 34.96 KG/M2 | TEMPERATURE: 98.1 F | DIASTOLIC BLOOD PRESSURE: 60 MMHG | WEIGHT: 190 LBS | OXYGEN SATURATION: 99 % | HEART RATE: 62 BPM | SYSTOLIC BLOOD PRESSURE: 154 MMHG

## 2017-09-05 DIAGNOSIS — R06.00 DYSPNEA: Primary | ICD-10-CM

## 2017-09-05 DIAGNOSIS — R07.89 BURNING CHEST PAIN: ICD-10-CM

## 2017-09-05 LAB
ALBUMIN SERPL BCP-MCNC: 3.3 G/DL (ref 3.5–5)
ALP SERPL-CCNC: 96 U/L (ref 46–116)
ALT SERPL W P-5'-P-CCNC: 46 U/L (ref 12–78)
ANION GAP SERPL CALCULATED.3IONS-SCNC: 9 MMOL/L (ref 4–13)
AST SERPL W P-5'-P-CCNC: 33 U/L (ref 5–45)
BASOPHILS # BLD AUTO: 0.1 THOUSANDS/ΜL (ref 0–0.1)
BASOPHILS NFR BLD AUTO: 1 % (ref 0–1)
BILIRUB SERPL-MCNC: 0.3 MG/DL (ref 0.2–1)
BUN SERPL-MCNC: 13 MG/DL (ref 5–25)
CALCIUM SERPL-MCNC: 9.4 MG/DL (ref 8.3–10.1)
CHLORIDE SERPL-SCNC: 104 MMOL/L (ref 100–108)
CO2 SERPL-SCNC: 27 MMOL/L (ref 21–32)
CREAT SERPL-MCNC: 0.67 MG/DL (ref 0.6–1.3)
EOSINOPHIL # BLD AUTO: 0.2 THOUSAND/ΜL (ref 0–0.61)
EOSINOPHIL NFR BLD AUTO: 3 % (ref 0–6)
ERYTHROCYTE [DISTWIDTH] IN BLOOD BY AUTOMATED COUNT: 12.9 % (ref 11.6–15.1)
GFR SERPL CREATININE-BSD FRML MDRD: 84 ML/MIN/1.73SQ M
GLUCOSE SERPL-MCNC: 123 MG/DL (ref 65–140)
HCT VFR BLD AUTO: 38.3 % (ref 37–47)
HGB BLD-MCNC: 12.7 G/DL (ref 12–16)
LYMPHOCYTES # BLD AUTO: 2.2 THOUSANDS/ΜL (ref 0.6–4.47)
LYMPHOCYTES NFR BLD AUTO: 28 % (ref 14–44)
MCH RBC QN AUTO: 30.5 PG (ref 27–31)
MCHC RBC AUTO-ENTMCNC: 33.1 G/DL (ref 31.4–37.4)
MCV RBC AUTO: 92 FL (ref 82–98)
MONOCYTES # BLD AUTO: 0.5 THOUSAND/ΜL (ref 0.17–1.22)
MONOCYTES NFR BLD AUTO: 6 % (ref 4–12)
NEUTROPHILS # BLD AUTO: 4.7 THOUSANDS/ΜL (ref 1.85–7.62)
NEUTS SEG NFR BLD AUTO: 62 % (ref 43–75)
NRBC BLD AUTO-RTO: 0 /100 WBCS
NT-PROBNP SERPL-MCNC: 181 PG/ML
PLATELET # BLD AUTO: 178 THOUSANDS/UL (ref 130–400)
PMV BLD AUTO: 9.5 FL (ref 8.9–12.7)
POTASSIUM SERPL-SCNC: 4 MMOL/L (ref 3.5–5.3)
PROT SERPL-MCNC: 6.9 G/DL (ref 6.4–8.2)
RBC # BLD AUTO: 4.16 MILLION/UL (ref 4.2–5.4)
SODIUM SERPL-SCNC: 140 MMOL/L (ref 136–145)
TROPONIN I SERPL-MCNC: <0.02 NG/ML
WBC # BLD AUTO: 7.6 THOUSAND/UL (ref 4.8–10.8)

## 2017-09-05 PROCEDURE — 80053 COMPREHEN METABOLIC PANEL: CPT | Performed by: EMERGENCY MEDICINE

## 2017-09-05 PROCEDURE — 93005 ELECTROCARDIOGRAM TRACING: CPT | Performed by: EMERGENCY MEDICINE

## 2017-09-05 PROCEDURE — 84484 ASSAY OF TROPONIN QUANT: CPT | Performed by: EMERGENCY MEDICINE

## 2017-09-05 PROCEDURE — 71010 HB CHEST X-RAY 1 VIEW FRONTAL (PORTABLE): CPT

## 2017-09-05 PROCEDURE — 71250 CT THORAX DX C-: CPT

## 2017-09-05 PROCEDURE — 99285 EMERGENCY DEPT VISIT HI MDM: CPT

## 2017-09-05 PROCEDURE — 36415 COLL VENOUS BLD VENIPUNCTURE: CPT | Performed by: EMERGENCY MEDICINE

## 2017-09-05 PROCEDURE — 85025 COMPLETE CBC W/AUTO DIFF WBC: CPT | Performed by: EMERGENCY MEDICINE

## 2017-09-05 PROCEDURE — 83880 ASSAY OF NATRIURETIC PEPTIDE: CPT | Performed by: EMERGENCY MEDICINE

## 2017-09-05 RX ORDER — PREGABALIN 150 MG/1
150 CAPSULE ORAL 2 TIMES DAILY
Qty: 20 CAPSULE | Refills: 0 | Status: SHIPPED | OUTPATIENT
Start: 2017-09-05 | End: 2017-10-09

## 2017-09-05 RX ORDER — PREGABALIN 150 MG/1
150 CAPSULE ORAL 2 TIMES DAILY
Qty: 20 CAPSULE | Refills: 0 | Status: SHIPPED | OUTPATIENT
Start: 2017-09-05 | End: 2017-09-05

## 2017-09-09 LAB
ATRIAL RATE: 73 BPM
P AXIS: 32 DEGREES
PR INTERVAL: 188 MS
QRS AXIS: 7 DEGREES
QRSD INTERVAL: 88 MS
QT INTERVAL: 368 MS
QTC INTERVAL: 405 MS
T WAVE AXIS: 40 DEGREES
VENTRICULAR RATE: 73 BPM

## 2017-09-14 ENCOUNTER — GENERIC CONVERSION - ENCOUNTER (OUTPATIENT)
Dept: OTHER | Facility: OTHER | Age: 77
End: 2017-09-14

## 2017-09-14 ENCOUNTER — ALLSCRIPTS OFFICE VISIT (OUTPATIENT)
Dept: OTHER | Facility: OTHER | Age: 77
End: 2017-09-14

## 2017-09-14 ENCOUNTER — APPOINTMENT (OUTPATIENT)
Dept: PHYSICAL THERAPY | Facility: CLINIC | Age: 77
End: 2017-09-14
Payer: MEDICARE

## 2017-09-14 DIAGNOSIS — Z12.31 ENCOUNTER FOR SCREENING MAMMOGRAM FOR MALIGNANT NEOPLASM OF BREAST: ICD-10-CM

## 2017-09-14 DIAGNOSIS — E11.42 TYPE 2 DIABETES MELLITUS WITH DIABETIC POLYNEUROPATHY (HCC): ICD-10-CM

## 2017-09-14 DIAGNOSIS — R07.9 CHEST PAIN: ICD-10-CM

## 2017-09-14 DIAGNOSIS — Z87.39 PERSONAL HISTORY OF OTHER DISEASES OF THE MUSCULOSKELETAL SYSTEM AND CONNECTIVE TISSUE: ICD-10-CM

## 2017-09-14 DIAGNOSIS — M54.2 CERVICALGIA: ICD-10-CM

## 2017-09-14 DIAGNOSIS — Z13.820 ENCOUNTER FOR SCREENING FOR OSTEOPOROSIS: ICD-10-CM

## 2017-09-14 PROCEDURE — 97110 THERAPEUTIC EXERCISES: CPT

## 2017-09-14 PROCEDURE — 97112 NEUROMUSCULAR REEDUCATION: CPT

## 2017-09-14 PROCEDURE — G8980 MOBILITY D/C STATUS: HCPCS

## 2017-09-14 PROCEDURE — G8979 MOBILITY GOAL STATUS: HCPCS

## 2017-09-15 ENCOUNTER — GENERIC CONVERSION - ENCOUNTER (OUTPATIENT)
Dept: OTHER | Facility: OTHER | Age: 77
End: 2017-09-15

## 2017-09-18 ENCOUNTER — TRANSCRIBE ORDERS (OUTPATIENT)
Dept: ADMINISTRATIVE | Facility: HOSPITAL | Age: 77
End: 2017-09-18

## 2017-09-18 ENCOUNTER — APPOINTMENT (OUTPATIENT)
Dept: LAB | Facility: HOSPITAL | Age: 77
End: 2017-09-18
Attending: FAMILY MEDICINE
Payer: MEDICARE

## 2017-09-18 DIAGNOSIS — E11.42 TYPE 2 DIABETES MELLITUS WITH DIABETIC POLYNEUROPATHY (HCC): ICD-10-CM

## 2017-09-18 DIAGNOSIS — E11.42 DIABETIC POLYNEUROPATHY ASSOCIATED WITH TYPE 2 DIABETES MELLITUS (HCC): Primary | ICD-10-CM

## 2017-09-18 DIAGNOSIS — E11.42 DIABETIC POLYNEUROPATHY ASSOCIATED WITH TYPE 2 DIABETES MELLITUS (HCC): ICD-10-CM

## 2017-09-18 LAB
ALBUMIN SERPL BCP-MCNC: 3.4 G/DL (ref 3.5–5)
ALP SERPL-CCNC: 83 U/L (ref 46–116)
ALT SERPL W P-5'-P-CCNC: 36 U/L (ref 12–78)
ANION GAP SERPL CALCULATED.3IONS-SCNC: 8 MMOL/L (ref 4–13)
AST SERPL W P-5'-P-CCNC: 23 U/L (ref 5–45)
BASOPHILS # BLD AUTO: 0 THOUSANDS/ΜL (ref 0–0.1)
BASOPHILS NFR BLD AUTO: 0 % (ref 0–1)
BILIRUB SERPL-MCNC: 0.5 MG/DL (ref 0.2–1)
BUN SERPL-MCNC: 12 MG/DL (ref 5–25)
CALCIUM SERPL-MCNC: 8.9 MG/DL (ref 8.3–10.1)
CHLORIDE SERPL-SCNC: 105 MMOL/L (ref 100–108)
CHOLEST SERPL-MCNC: 227 MG/DL (ref 50–200)
CO2 SERPL-SCNC: 27 MMOL/L (ref 21–32)
CREAT SERPL-MCNC: 0.64 MG/DL (ref 0.6–1.3)
CREAT UR-MCNC: 113 MG/DL
EOSINOPHIL # BLD AUTO: 0.2 THOUSAND/ΜL (ref 0–0.61)
EOSINOPHIL NFR BLD AUTO: 3 % (ref 0–6)
ERYTHROCYTE [DISTWIDTH] IN BLOOD BY AUTOMATED COUNT: 13.2 % (ref 11.6–15.1)
EST. AVERAGE GLUCOSE BLD GHB EST-MCNC: 154 MG/DL
GFR SERPL CREATININE-BSD FRML MDRD: 86 ML/MIN/1.73SQ M
GLUCOSE P FAST SERPL-MCNC: 126 MG/DL (ref 65–99)
HBA1C MFR BLD: 7 % (ref 4.2–6.3)
HCT VFR BLD AUTO: 39.2 % (ref 37–47)
HDLC SERPL-MCNC: 73 MG/DL (ref 40–60)
HGB BLD-MCNC: 13.3 G/DL (ref 12–16)
LDLC SERPL CALC-MCNC: 115 MG/DL (ref 0–100)
LYMPHOCYTES # BLD AUTO: 2.2 THOUSANDS/ΜL (ref 0.6–4.47)
LYMPHOCYTES NFR BLD AUTO: 28 % (ref 14–44)
MCH RBC QN AUTO: 31.3 PG (ref 27–31)
MCHC RBC AUTO-ENTMCNC: 33.9 G/DL (ref 31.4–37.4)
MCV RBC AUTO: 92 FL (ref 82–98)
MICROALBUMIN UR-MCNC: 35.5 MG/L (ref 0–20)
MICROALBUMIN/CREAT 24H UR: 31 MG/G CREATININE (ref 0–30)
MONOCYTES # BLD AUTO: 0.5 THOUSAND/ΜL (ref 0.17–1.22)
MONOCYTES NFR BLD AUTO: 6 % (ref 4–12)
NEUTROPHILS # BLD AUTO: 5.1 THOUSANDS/ΜL (ref 1.85–7.62)
NEUTS SEG NFR BLD AUTO: 64 % (ref 43–75)
NRBC BLD AUTO-RTO: 0 /100 WBCS
PLATELET # BLD AUTO: 196 THOUSANDS/UL (ref 130–400)
PMV BLD AUTO: 9 FL (ref 8.9–12.7)
POTASSIUM SERPL-SCNC: 4 MMOL/L (ref 3.5–5.3)
PROT SERPL-MCNC: 7 G/DL (ref 6.4–8.2)
RBC # BLD AUTO: 4.26 MILLION/UL (ref 4.2–5.4)
SODIUM SERPL-SCNC: 140 MMOL/L (ref 136–145)
TRIGL SERPL-MCNC: 196 MG/DL
WBC # BLD AUTO: 8 THOUSAND/UL (ref 4.8–10.8)

## 2017-09-18 PROCEDURE — 85025 COMPLETE CBC W/AUTO DIFF WBC: CPT

## 2017-09-18 PROCEDURE — 80061 LIPID PANEL: CPT

## 2017-09-18 PROCEDURE — 83036 HEMOGLOBIN GLYCOSYLATED A1C: CPT

## 2017-09-18 PROCEDURE — 82043 UR ALBUMIN QUANTITATIVE: CPT

## 2017-09-18 PROCEDURE — 82570 ASSAY OF URINE CREATININE: CPT

## 2017-09-18 PROCEDURE — 80053 COMPREHEN METABOLIC PANEL: CPT

## 2017-09-26 ENCOUNTER — HOSPITAL ENCOUNTER (OUTPATIENT)
Dept: RADIOLOGY | Facility: HOSPITAL | Age: 77
Discharge: HOME | End: 2017-09-26
Attending: FAMILY MEDICINE
Payer: MEDICARE

## 2017-09-26 DIAGNOSIS — Z13.820 ENCOUNTER FOR SCREENING FOR OSTEOPOROSIS: ICD-10-CM

## 2017-09-26 DIAGNOSIS — Z12.31 ENCOUNTER FOR SCREENING MAMMOGRAM FOR MALIGNANT NEOPLASM OF BREAST: ICD-10-CM

## 2017-09-26 PROCEDURE — 77080 DXA BONE DENSITY AXIAL: CPT

## 2017-09-26 PROCEDURE — G0202 SCR MAMMO BI INCL CAD: HCPCS

## 2017-10-09 NOTE — PRE-PROCEDURE INSTRUCTIONS
Pre-Surgery Instructions:   Medication Instructions    ascorbic acid (VITAMIN C) 500 mg tablet Instructed patient per Anesthesia Guidelines   bimatoprost (LUMIGAN) 0 01 % ophthalmic drops Instructed patient per Anesthesia Guidelines   metFORMIN (GLUCOPHAGE) 850 mg tablet Instructed patient per Anesthesia Guidelines   metoprolol succinate (TOPROL-XL) 50 mg 24 hr tablet Instructed patient per Anesthesia Guidelines   Multiple Vitamins-Minerals (PRESERVISION AREDS 2 PO) Instructed patient per Anesthesia Guidelines   Omega-3 Fatty Acids (FISH OIL PO) Instructed patient per Anesthesia Guidelines   oxyCODONE-acetaminophen (PERCOCET)  mg per tablet Instructed patient per Anesthesia Guidelines   rosuvastatin (CRESTOR) 20 MG tablet Instructed patient per Anesthesia Guidelines  Pre op instructions given  Pt to take metoprolol the am of surgery   sister [facility]    The following information was developed to assist you to prepare for your operation  What do I need to do before coming to the hospital?   Arrange for a responsible person to drive you to and from the hospital    Arrange care for your children at home  Children are not allowed in the recovery areas of the hospital   Plan to wear clothing that is easy to put on and take off  If you are having shoulder surgery, wear a shirt that buttons or zippers in the front  Bathing  o Shower the evening before and the morning of your surgery with an antibacterial soap  Please refer to the Pre Op Showering Instructions for Surgery Patients Sheet   o Remove nail polish and all body piercing jewelry  o Do not shave any body part for at least 24 hours before surgery-this includes face, arms, legs and upper body  Food  o Nothing to eat or drink after midnight the night before your surgery   This includes candy and chewing gum  o Exception: If your surgery is after 12:00pm (noon), you may have clear liquids such as 7-Up®, ginger ale, apple or cranberry juice, Jell-O®, water, or clear broth until 8:00 am  o Do not drink milk or juice with pulp on the morning before surgery  o Do not drink alcohol 24 hours before surgery  Medicine  o Follow instructions you received from your surgeon about which medicines you may take on the day of surgery  o If instructed to take medicine on the morning of surgery, take pills with just a small sip of water  Call your prescribing doctor for specific infroamtion on what to do if you take insulin    What should I bring to the hospital?    Bring:  Lesly Bajwa or a walker, if you have them, for foot or knee surgery   A list of the daily medicines, vitamins, minerals, herbals and nutritional supplements you take  Include the dosages of medicines and the time you take them each day   Glasses, dentures or hearing aids   Minimal clothing; you will be wearing hospital sleepwear   Photo ID; required to verify your identity   If you have a Living Will or Power of , bring a copy of the documents   If you have an ostomy, bring an extra pouch and any supplies you use    Do not bring   Medicines or inhalers   Money, valuables or jewelry    What other information should I know about the day of surgery?  Notify your surgeons if you develop a cold, sore throat, cough, fever, rash or any other illness   Report to the Ambulatory Surgical/Same Day Surgery Unit   You will be instructed to stop at Registration only if you have not been pre-registered   Inform your  fi they do not stay that they will be asked by the staff to leave a phone number where they can be reached   Be available to be reached before surgery   In the event the operating room schedule changes, you may be asked to come in earlier or later than expected    *It is important to tell your doctor and others involved in your health care if you are taking or have been taking any non-prescription drugs, vitamins, minerals, herbals or other nutritional supplements   Any of these may interact with some food or medicines and cause a reaction

## 2017-10-11 ENCOUNTER — ALLSCRIPTS OFFICE VISIT (OUTPATIENT)
Dept: OTHER | Facility: OTHER | Age: 77
End: 2017-10-11

## 2017-10-11 PROBLEM — H25.812 COMBINED FORMS OF AGE-RELATED CATARACT OF LEFT EYE: Status: ACTIVE | Noted: 2017-10-11

## 2017-10-12 ENCOUNTER — ANESTHESIA EVENT (OUTPATIENT)
Dept: PERIOP | Facility: AMBULARY SURGERY CENTER | Age: 77
End: 2017-10-12
Payer: MEDICARE

## 2017-10-12 ENCOUNTER — HOSPITAL ENCOUNTER (OUTPATIENT)
Facility: AMBULARY SURGERY CENTER | Age: 77
Setting detail: OUTPATIENT SURGERY
Discharge: HOME | End: 2017-10-12
Attending: OPHTHALMOLOGY | Admitting: OPHTHALMOLOGY
Payer: MEDICARE

## 2017-10-12 ENCOUNTER — ANESTHESIA (OUTPATIENT)
Dept: PERIOP | Facility: AMBULARY SURGERY CENTER | Age: 77
End: 2017-10-12
Payer: MEDICARE

## 2017-10-12 VITALS
HEART RATE: 62 BPM | BODY MASS INDEX: 36.44 KG/M2 | OXYGEN SATURATION: 99 % | HEIGHT: 62 IN | DIASTOLIC BLOOD PRESSURE: 74 MMHG | SYSTOLIC BLOOD PRESSURE: 168 MMHG | RESPIRATION RATE: 18 BRPM | TEMPERATURE: 97.4 F | WEIGHT: 198 LBS

## 2017-10-12 PROBLEM — H25.812 COMBINED FORMS OF AGE-RELATED CATARACT OF LEFT EYE: Status: RESOLVED | Noted: 2017-10-11 | Resolved: 2017-10-12

## 2017-10-12 LAB — GLUCOSE SERPL-MCNC: 128 MG/DL (ref 65–140)

## 2017-10-12 PROCEDURE — 82948 REAGENT STRIP/BLOOD GLUCOSE: CPT

## 2017-10-12 PROCEDURE — V2632 POST CHMBR INTRAOCULAR LENS: HCPCS | Performed by: OPHTHALMOLOGY

## 2017-10-12 DEVICE — IOL SN60WF 22.0: Type: IMPLANTABLE DEVICE | Site: EYE | Status: FUNCTIONAL

## 2017-10-12 RX ORDER — TROPICAMIDE 10 MG/ML
1 SOLUTION/ DROPS OPHTHALMIC
Status: COMPLETED | OUTPATIENT
Start: 2017-10-12 | End: 2017-10-12

## 2017-10-12 RX ORDER — SODIUM CHLORIDE, SODIUM LACTATE, POTASSIUM CHLORIDE, CALCIUM CHLORIDE 600; 310; 30; 20 MG/100ML; MG/100ML; MG/100ML; MG/100ML
75 INJECTION, SOLUTION INTRAVENOUS CONTINUOUS
Status: DISCONTINUED | OUTPATIENT
Start: 2017-10-12 | End: 2017-10-12 | Stop reason: HOSPADM

## 2017-10-12 RX ORDER — LIDOCAINE HYDROCHLORIDE 10 MG/ML
INJECTION, SOLUTION EPIDURAL; INFILTRATION; INTRACAUDAL; PERINEURAL AS NEEDED
Status: DISCONTINUED | OUTPATIENT
Start: 2017-10-12 | End: 2017-10-12 | Stop reason: SURG

## 2017-10-12 RX ORDER — OFLOXACIN 3 MG/ML
SOLUTION/ DROPS OPHTHALMIC AS NEEDED
Status: DISCONTINUED | OUTPATIENT
Start: 2017-10-12 | End: 2017-10-12 | Stop reason: HOSPADM

## 2017-10-12 RX ORDER — SILICONE ADHESIVE 1.5" X 3"
SHEET (EA) TOPICAL AS NEEDED
Status: DISCONTINUED | OUTPATIENT
Start: 2017-10-12 | End: 2017-10-12 | Stop reason: HOSPADM

## 2017-10-12 RX ORDER — PROPOFOL 10 MG/ML
INJECTION, EMULSION INTRAVENOUS AS NEEDED
Status: DISCONTINUED | OUTPATIENT
Start: 2017-10-12 | End: 2017-10-12 | Stop reason: SURG

## 2017-10-12 RX ORDER — MIDAZOLAM HYDROCHLORIDE 1 MG/ML
INJECTION INTRAMUSCULAR; INTRAVENOUS AS NEEDED
Status: DISCONTINUED | OUTPATIENT
Start: 2017-10-12 | End: 2017-10-12 | Stop reason: SURG

## 2017-10-12 RX ORDER — TETRACAINE HYDROCHLORIDE 5 MG/ML
SOLUTION OPHTHALMIC AS NEEDED
Status: DISCONTINUED | OUTPATIENT
Start: 2017-10-12 | End: 2017-10-12 | Stop reason: HOSPADM

## 2017-10-12 RX ORDER — OFLOXACIN 3 MG/ML
1 SOLUTION/ DROPS OPHTHALMIC
Status: COMPLETED | OUTPATIENT
Start: 2017-10-12 | End: 2017-10-12

## 2017-10-12 RX ORDER — CYCLOPENTOLATE HYDROCHLORIDE 10 MG/ML
1 SOLUTION/ DROPS OPHTHALMIC
Status: COMPLETED | OUTPATIENT
Start: 2017-10-12 | End: 2017-10-12

## 2017-10-12 RX ORDER — SODIUM CHLORIDE, SODIUM LACTATE, POTASSIUM CHLORIDE, CALCIUM CHLORIDE 600; 310; 30; 20 MG/100ML; MG/100ML; MG/100ML; MG/100ML
INJECTION, SOLUTION INTRAVENOUS CONTINUOUS PRN
Status: DISCONTINUED | OUTPATIENT
Start: 2017-10-12 | End: 2017-10-12 | Stop reason: SURG

## 2017-10-12 RX ORDER — PHENYLEPHRINE HCL 2.5 %
1 DROPS OPHTHALMIC (EYE)
Status: COMPLETED | OUTPATIENT
Start: 2017-10-12 | End: 2017-10-12

## 2017-10-12 RX ADMIN — PHENYLEPHRINE HYDROCHLORIDE 1 DROP: 25 SOLUTION/ DROPS OPHTHALMIC at 10:25

## 2017-10-12 RX ADMIN — CYCLOPENTOLATE HYDROCHLORIDE 1 DROP: 10 SOLUTION/ DROPS OPHTHALMIC at 10:15

## 2017-10-12 RX ADMIN — PHENYLEPHRINE HYDROCHLORIDE 1 DROP: 25 SOLUTION/ DROPS OPHTHALMIC at 10:15

## 2017-10-12 RX ADMIN — MIDAZOLAM HYDROCHLORIDE 2 MG: 1 INJECTION, SOLUTION INTRAMUSCULAR; INTRAVENOUS at 11:28

## 2017-10-12 RX ADMIN — TROPICAMIDE 1 DROP: 10 SOLUTION/ DROPS OPHTHALMIC at 10:15

## 2017-10-12 RX ADMIN — LIDOCAINE HYDROCHLORIDE 50 MG: 10 INJECTION, SOLUTION EPIDURAL; INFILTRATION; INTRACAUDAL; PERINEURAL at 11:05

## 2017-10-12 RX ADMIN — CYCLOPENTOLATE HYDROCHLORIDE 1 DROP: 10 SOLUTION/ DROPS OPHTHALMIC at 10:25

## 2017-10-12 RX ADMIN — TROPICAMIDE 1 DROP: 10 SOLUTION/ DROPS OPHTHALMIC at 10:35

## 2017-10-12 RX ADMIN — PHENYLEPHRINE HYDROCHLORIDE 1 DROP: 25 SOLUTION/ DROPS OPHTHALMIC at 10:35

## 2017-10-12 RX ADMIN — OFLOXACIN 1 DROP: 3 SOLUTION/ DROPS OPHTHALMIC at 10:05

## 2017-10-12 RX ADMIN — PHENYLEPHRINE HYDROCHLORIDE 1 DROP: 25 SOLUTION/ DROPS OPHTHALMIC at 10:05

## 2017-10-12 RX ADMIN — PROPOFOL 50 MG: 10 INJECTION, EMULSION INTRAVENOUS at 11:06

## 2017-10-12 RX ADMIN — PROPOFOL 50 MG: 10 INJECTION, EMULSION INTRAVENOUS at 11:05

## 2017-10-12 RX ADMIN — OFLOXACIN 1 DROP: 3 SOLUTION/ DROPS OPHTHALMIC at 10:15

## 2017-10-12 RX ADMIN — CYCLOPENTOLATE HYDROCHLORIDE 1 DROP: 10 SOLUTION/ DROPS OPHTHALMIC at 10:05

## 2017-10-12 RX ADMIN — SODIUM CHLORIDE, SODIUM LACTATE, POTASSIUM CHLORIDE, AND CALCIUM CHLORIDE: .6; .31; .03; .02 INJECTION, SOLUTION INTRAVENOUS at 06:51

## 2017-10-12 RX ADMIN — TROPICAMIDE 1 DROP: 10 SOLUTION/ DROPS OPHTHALMIC at 10:25

## 2017-10-12 RX ADMIN — CYCLOPENTOLATE HYDROCHLORIDE 1 DROP: 10 SOLUTION/ DROPS OPHTHALMIC at 10:35

## 2017-10-12 RX ADMIN — OFLOXACIN 1 DROP: 3 SOLUTION/ DROPS OPHTHALMIC at 10:25

## 2017-10-12 RX ADMIN — TROPICAMIDE 1 DROP: 10 SOLUTION/ DROPS OPHTHALMIC at 10:05

## 2017-10-12 RX ADMIN — OFLOXACIN 1 DROP: 3 SOLUTION/ DROPS OPHTHALMIC at 10:35

## 2017-10-12 NOTE — H&P
H and P from primary care physician was reviewed today and the  Patient was re-assessed prior to surgery and there were no changes  noted from today's  exam or the H/P that would interfere with surgery

## 2017-10-12 NOTE — ANESTHESIA PREPROCEDURE EVALUATION
Review of Systems/Medical History  Patient summary reviewed  Chart reviewed      Cardiovascular  EKG reviewed, Hyperlipidemia, Hypertension ,    Pulmonary  Shortness of breath, ,        GI/Hepatic    Liver disease, ,             Endo/Other  Diabetes well controlled , Arthritis     GYN       Hematology   Musculoskeletal       Neurology    Neuromuscular disease ,    Psychology           Physical Exam    Airway    Mallampati score: II  TM Distance: >3 FB  Neck ROM: full     Dental   upper dentures and lower dentures,     Cardiovascular  Rhythm: regular, Rate: normal,     Pulmonary  Breath sounds clear to auscultation,     Other Findings        Anesthesia Plan  ASA Score- 2       Anesthesia Type- IV sedation with anesthesia with ASA Monitors  Additional Monitors:   Airway Plan:           Induction- intravenous  Informed Consent- Anesthetic plan and risks discussed with patient

## 2017-10-12 NOTE — ANESTHESIA POSTPROCEDURE EVALUATION
Post-Op Assessment Note      CV Status:  Stable    Mental Status:  Alert and awake    Hydration Status:  Euvolemic    PONV Controlled:  Controlled    Airway Patency:  Patent    Post Op Vitals Reviewed: Yes          Staff: Anesthesiologist           BP     Temp      Pulse     Resp      SpO2

## 2017-10-12 NOTE — OP NOTE
Postoperative Note  PATIENT NAME: Andrew Lopes  : 1940  MRN: [id number]  [facility] OR ROOM 01    Surgery Date: 10/12/2017    Age-related nuclear cataract of left eye [H25 12]    Post-Op Diagnosis Codes:     * Age-related nuclear cataract of left eye [H25 12]    Procedure(s):  EXTRACTION EXTRACAPSULAR CATARACT PHACO INTRAOCULAR LENS (IOL) LEFT EYE    Surgeon(s) and Role:     * Andres Albright MD - Primary    Assistants:  Circulator: Red Foote RN  Relief Circulator: Ena Holliday RN  Scrub Person: Ale Frankel RN; Adeel Gonzalez    Anesthesia Type:   IV Sedation with Anesthesia     Anesthesiologist: Wade Holder MD    Operative Indication:  Vision reduced to 20/60 in the left eye secondary to progressive cataract formation  The cataract was interfering with the patient's daily activities  All risks and benefits to the surgical procedure were explained to the patient and family members that were present during the pre-operative visit  The risks included but were not limited to blindness, infection, choroidal hemorrhage, loss of the eye, glaucoma, corneal edema, macular edema, retinal detachment, the need for a corneal transplant and the need to wear glasses postoperatively  The patient understood and signed the appropriate consent form  Operative Technique:  The patient was brought back to the operating suite and placed in the supine position  The patient was identified in from of the surgeon as well as the OR staff  The operative eye was confirmed and marked  The patient  was given a peribulbar block using a 50:50 mixture of 0 75% Marcaine and 2% Lidocaine  The pressure of the eye was checked by digital massage  The operative eye was prepped and draped in the usual sterile fashion  A wire lid speculum was inserted  A clear corneal, temporal approach was used  A 2 75 Phaco blade was used to tunnel and enter the  cornea from the temporal side   The anterior chamber was entered and visco-elastic was used to deepen the anterior chamber  Side port paracentesis tracts were performed using a 1 0 mm paracentesis blade at approximately 12 and 6 position  A circular tear capsulotomy was performed using a 25 gauge bent needle and Utrata capsulotomy forceps  Clementon dissection was carried out with balanced salt solution using a 27 gauge flat irrigating cannula  The nucleus was freely rotatable with in the capsular bag  The Nucleus was phacoemulsified   Using the [facility] machine and the phaco chop method  The cortical shell was removed using the bimanual I/A  The posterior capsule was polished as indicated  The posterior capsular bag was inflated using Provisc  The posterior chamber intraocular lens power+22 00 [id number] was taken from the package an  inspected and the power confirmed  The lens was then inserted  into the posterior capsular bag using the appropriate IOL folder and   The lens was well centered using the Sinsky hook  The Provisc was removed using the I/A unit  The side ports and the temporal incision were stromal hydrated until they were water tight  A 10-vicryl suture was placed to further secure the temporal incision if indicated after testing the incision  The pressure of the eye was adjusted accordingly using balance salt solution through the side ports  At the end of the case the patient was given a drop of Iopidine to prevent a pressure spike  Also, the patient was given a drop of antibiotic drop and antibiotic ointment to prevent infection  The patient's eye was then patched with an eye pad and covered with a plastic shield  The Patient was then taken to the recovery room  After vital signs were stable the patient was discharged with family/friend in satisfactory condition       Addendum:  Stan Wallis MD

## 2017-10-12 NOTE — DISCHARGE SUMMARY
Lotus Yung was discharged in satisfactory condition  Discharge instructions were reviewed and then given to the patient   Arrangements were made for follow up the next day with Gladys Huynh MD

## 2017-10-12 NOTE — DISCHARGE INSTRUCTIONS
[facility]    Discharge Instructions    Dr Angelo Henson and Dr Chitra Thapa  [phone]    Jarad Nielsen discharged in satisfactory condition  Post operative instructions were given  Patient told to bring drops to office tomorrow  Follow-up Appointment was given for 8:30 AM on 10/13/17 at the  [facility]  Normal Symptoms: Foreign body sensation, scratchy, picky feeling  Try Extra Strength Tylenol for headache  Call Office if severe pain or discomfort  Keep patch on operative eye until seen unless told otherwise  Bring pre-operative drops and sunglasses to office in morning   (3 bottles)  Instructions on how to use the drops will be given in AM     Activity: Avoid any heavy,  bending, lifting or excessive activity until instructed  May walk around home with assistance  OK to watch TV  Avoid any excessive reading  No driving until told (Normally 2-4 days after surgery)  Avoid sleeping on operative eye  No water in the eye    Diet: Resume normal diet as tolerated    If experiencing nausea, try bland foods or liquid diet first      Resume normal medications unless otherwise instructed     If any Questions or Problems Please Call: [phone]

## 2017-10-13 ENCOUNTER — TRANSCRIBE ORDERS (OUTPATIENT)
Dept: ADMINISTRATIVE | Facility: HOSPITAL | Age: 77
End: 2017-10-13

## 2017-10-13 ENCOUNTER — ALLSCRIPTS OFFICE VISIT (OUTPATIENT)
Dept: OTHER | Facility: OTHER | Age: 77
End: 2017-10-13

## 2017-10-13 DIAGNOSIS — R07.9 CHEST PAIN, UNSPECIFIED TYPE: Primary | ICD-10-CM

## 2017-10-14 NOTE — PROGRESS NOTES
Assessment  Assessed    1  Chest pain (786 50) (R07 9)   2  Shortness of breath (786 05) (R06 02)   3  Diabetes mellitus with polyneuropathy (250 60,357 2) (E11 42)   4  Hyperlipidemia (272 4) (E78 5)    Plan  Chest pain    · CARDIAC CATHETERIZATION; Status:Need Information - Financial Authorization; Requested [id number];    Perform:Located within [facility]; Due:13Oct2018; Last Updated By:Remigio Urrutia; 10/13/2017 10:50:22 AM;Ordered; For:Chest pain; Ordered By:Antoinette Monge;   · ECHO COMPLETE WITH CONTRAST IF INDICATED; Status:Need Information - Financial  Authorization; Requested GOE:[id number];    Perform:[facility]; Due:13Oct2018; Last Updated By:Remigio Urrutia; 10/13/2017 10:50:22 AM;Ordered; For:Chest pain; Ordered By:Antoinette Monge;   · EKG/ECG- POC; Status:Complete;   Done: 11BMM1963   Perform: In Office; Due:13Oct2018; Last Updated By:Alysa Pineda; 10/13/2017 10:09:45 AM;Ordered; For:Chest pain; Ordered By:Antoinette Monge;    Discussion/Summary  Cardiology Discussion Summary Free Text Note Form  Lu:   Ms Sarai Conteh continues to have chest pain and shortness of breath with exertion  She has multiple risk factors for CAD including DM, Hypertension, Dyslipidemia  though she had a normal nuclear stress test, she is at high risk for CAD  Will schedule for cardiac catheterization to rule out CAD  echocardiogram will also be obtained  up after testing complete  Chief Complaint  Chief Complaint Free Text Note Form: f/u, pt has c/o chest pain and discomfort when walking, shooting pain down left arm, also has a torn ligament in arm  Shortness of breath on exertion  EKG done  JW      History of Present Illness  Cardiology HPI Free Text Note Form  Luke: Ms Sarai Conteh is a 65year old female here for evaluation of chest pain  She was admitted to hospital in June with chest pain post fall  Stress test at that time was normal  Has had left shoulder pain since then   She also describes chest burning and shortness of breath  Symptoms only occur with exertion  Improves with heat to arm  risk factors for CAD including DM, Hypertension, Dyslipidemia  Review of Systems  Cardiology Female ROS:     Cardiac: as noted in HPI  Skin: No complaints of nonhealing sores or skin rash  General: lack of energy/fatigue  Respiratory: shortness of breath, but-- no cough/sputum-- and-- no wheezing  Gastrointestinal: No complaints of liver problems, nausea, vomiting, heartburn, constipation, bloody stools, diarrhea, problems swallowing, adbominal pain, or rectal bleeding  Neurological: No complaints of numbness, tingling, dizziness, weakness, seizures, headaches, syncope or fainting, AM fatigue, daytime sleepiness, no witnessed apnea episodes  Musculoskeletal: arthritis-- and-- swelling/pain   ROS Reviewed:   ROS reviewed  Active Problems  Problems    1  Acquired ankle/foot deformity (736 70) (M21 969)   2  Acute pain of left shoulder (719 41) (M25 512)   3  Anterior tibialis tendinitis of left lower extremity (726 72) (M76 812)   4  Arthralgia of left foot (719 47) (M25 572)   5  Atherosclerotic peripheral vascular disease (440 20) (I70 209)   6  BMI 33 0-33 9,adult (V85 33) (Z68 33)   7  Callus (700) (L84)   8  Cataracts, both eyes (366 9) (H26 9)   9  Cervicalgia (723 1) (M54 2)   10  Change in bowel habits (787 99) (R19 4)   11  Chest pain (786 50) (R07 9)   12  Diabetes mellitus with polyneuropathy (250 60,357 2) (E11 42)   13  Fibromyalgia (729 1) (M79 7)   14  Glaucoma (365 9) (H40 9)   15  Hyperlipidemia (272 4) (E78 5)   16  Knee pain, right (719 46) (M25 561)   17  Leg pain, right (729 5) (M79 604)   18  Multilevel degenerative disc disease (722 6) (M53 9)   19  Noncompliance of patient with other medical treatment and regimen (V15 81) (Z91 19)   20  Onychomycosis (110 1) (B35 1)   21  Pain in both feet (729 5) (M79 671,M79 672)   22  Poorly controlled type 2 diabetes mellitus (250 00) (E11 65)   23   Preoperative clearance (V72 84) (Z01 818)   24  Primary localized osteoarthritis of both knees (715 16) (M17 0)   25  Recurrent falls (V15 88) (R29 6)   26  Risk for falls (V15 88) (Z91 81)   27  Sciatica of left side (724 3) (M54 32)   28  Screening for depression (V79 0) (Z13 89)   29  Screening for diabetes mellitus (V77 1) (Z13 1)   30  Screening for osteoporosis (V82 81) (Z13 820)   31  Shortness of breath (786 05) (R06 02)   32  Tear of left supraspinatus tendon, subsequent encounter (V58 89,840 6) (S46 812D)   33  Transition of care   34  Visit for screening mammogram (V76 12) (Z12 31)    Past Medical History  Problems    1  History of Abdominal pain, epigastric (789 06) (R10 13)   2  History of Actinomycotic sepsis (038 8,995 91) (A42 7)   3  History of Acute upper respiratory infection (465 9) (J06 9)   4  History of Acute URI (465 9) (J06 9)   5  History of Anxiety (300 00) (F41 9)   6  History of Bee sting, accidental or unintentional, initial encounter (989 5,E905 3)   (T63 441A)   7  History of BMI 34 0-34 9,adult (V85 34) (Z68 34)   8  History of Diabetes Mellitus (250 00)   9  History of Encounter for routine gynecological examination (V72 31) (Z01 419)   10  History of Encounter for special screening examination for genitourinary disorder (V81 6)    (Z13 89)   11  History of acute bronchitis (V12 69) (Z87 09)   12  History of dermatitis (V13 3) (Z87 2)   13  History of essential hypertension (V12 59) (Z86 79)   14  History of hair or hair follicle disorder (D44 9) (Z87 898)   15  History of herpes zoster (V12 09) (Z86 19)   16  History of hyperlipidemia (V12 29) (Z86 39)   17  History of hypertension (V12 59) (Z86 79)   18  History of insomnia (V13 89) (Z87 898)   19  History of low back pain (V13 59) (Z87 39)   20  History of urinary frequency (V13 09) (Z87 898)   21  History of Impacted cerumen, unspecified laterality (380 4) (H61 20)   22   History of Infective otitis externa, unspecified laterality (380 10) (H60 399)   23  History of Knee contusion (924 11) (S80 00XA)   24  History of Meralgia paresthetica (355 1) (G57 10)   25  History of Meralgia paresthetica (355 1) (G57 10)   26  History of Noncompliance of patient with other medical treatment and regimen (V15 81)    (Z91 19)   27  History of Noncompliance of patient with other medical treatment and regimen (V15 81)    (Z91 19)   28  History of Nonvenomous Insect Bite Of Forearm (913 4)   29  History of Rib injury   30  History of Screening for heart disease (V81 2) (Z13 6)   31  History of Skin rash (782 1) (R21)   32  History of Sprained Ribs (848 3)   33  History of Trigeminal neuralgia (350 1) (G50 0)   34  History of UTI (lower urinary tract infection) (599 0) (N39 0)   35  History of Visit for pre-operative examination (V72 84) (Z01 818)   36  History of Xerosis cutis (706 8) (L85 3)  Active Problems And Past Medical History Reviewed: The active problems and past medical history were reviewed and updated today  Surgical History  Surgical History Reviewed: The surgical history was reviewed and updated today  Family History  Mother    1  No significant family history  Father    2  No significant family history  Family History Reviewed: The family history was reviewed and updated today  Social History  Problems    · Denied: History of Alcohol Use (History)   · Denied: History of Drug Use   · Never A Smoker  Social History Reviewed: The social history was reviewed and updated today  Current Meds   1  Lumigan 0 01 % Ophthalmic Solution; Therapy: (Recorded:29Mar2017) to Recorded   2  MetFORMIN HCl - 850 MG Oral Tablet; take 1 tablet by mouth twice a day; Therapy: 33PCY8886 to [phone])  Requested for: 15HBP3617; Last   Rx:[id number] Ordered   3  Metoprolol Succinate ER 50 MG Oral Tablet Extended Release 24 Hour; take 1 tablet by   mouth once daily;    Therapy: 99ITK0748 to (Evaluate:16Apr2017)  Requested for: 20Jun2016; Last   Rx:20Jun2016 Ordered   4  Oxycodone-Acetaminophen  MG Oral Tablet; TAKE 1 TABLET EVERY 6 HOURS AS   NEEDED; Last Rx:[id number] Ordered   5  Pazeo 0 7 % Ophthalmic Solution; Therapy: (Rupert Ou) to Recorded   6  ProAir  (90 Base) MCG/ACT Inhalation Aerosol Solution; INHALE 1 TO 2 PUFFS   EVERY 6 HOURS AS NEEDED; Therapy: 82GOK6151 to (Last Rx:30Mar2017)  Requested for: 15AXR8959 Ordered   7  Rosuvastatin Calcium 20 MG Oral Tablet; TAKE 1 TABLET DAILY  Requested for:   20Jun2016; Last Rx:20Jun2016 Ordered  Medication List Reviewed: The medication list was reviewed and updated today  Allergies  Medication    1  Combigan SOLN  Non-Medication    2  IVP Dye   3  No Known Latex Allergies    Vitals  Vital Signs    Recorded: 69DMZ2381 10:07AM   Heart Rate 59, Apical   Systolic 660, RUE, Sitting   Diastolic 76, RUE, Sitting   BP CUFF SIZE Large   Height 5 ft 2 in   Weight 197 lb 11 2 oz   BMI Calculated 36 16   BSA Calculated 1 9   O2 Saturation 98, RA     Physical Exam    Constitutional - General appearance: No acute distress, well appearing and well nourished  Eyes - Conjunctiva and Sclera examination: Conjunctiva pink, sclera anicteric  Neck - Normal, no JVD   Pulmonary - Respiratory effort: No signs of respiratory distress  -- Auscultation of lungs: Clear to auscultation  Cardiovascular - Auscultation of heart: Normal rate and rhythm, normal S1 and S2, no murmurs  -- Pedal pulses: Normal, 2+ bilaterally  -- Examination of extremities for edema and/or varicosities: Normal     Abdomen - Soft  Musculoskeletal - Gait and station: Normal gait  Inspection/palpation of joints, bones, and muscles: Abnormal-- diffusely tender  Skin - Skin: Normal without rashes  Skin is warm and well perfused  Neurologic - Speech normal  No focal deficits     Psychiatric - Orientation to person, place, and time: Normal -- Mood and affect: Normal       Results/Data  ECG Report:   Rhythm and rate:  normal sinus rhythm  P-waves:   the P wave is normal -- NE interval is normal    QRS: the QRS is normal   ST segment: the ST segments are normal       Health Management  Screening for diabetes mellitus   *VB - Eye Exam; every 1 year; Last 11Apr2017; Next Due: 11Apr2018; Active  Visit for screening mammogram   Digital Bilateral Screening Mammogram With CAD; every 1 year; Last 08NAW8643; Next Due:  48ZPW4323;  Overdue    Signatures   Electronically signed by : Mali So DO; Oct 13 2017 12:07PM EST                       (Author)

## 2017-10-18 ENCOUNTER — TELEPHONE (OUTPATIENT)
Dept: NON INVASIVE DIAGNOSTICS | Facility: HOSPITAL | Age: 77
End: 2017-10-18

## 2017-10-19 ENCOUNTER — HOSPITAL ENCOUNTER (INPATIENT)
Facility: HOSPITAL | Age: 77
LOS: 1 days | DRG: 282 | End: 2017-10-20
Attending: EMERGENCY MEDICINE | Admitting: FAMILY MEDICINE
Payer: MEDICARE

## 2017-10-19 ENCOUNTER — HOSPITAL ENCOUNTER (OUTPATIENT)
Dept: NON INVASIVE DIAGNOSTICS | Facility: HOSPITAL | Age: 77
Discharge: HOME | End: 2017-10-19
Attending: INTERNAL MEDICINE
Payer: MEDICARE

## 2017-10-19 ENCOUNTER — GENERIC CONVERSION - ENCOUNTER (OUTPATIENT)
Dept: OTHER | Facility: OTHER | Age: 77
End: 2017-10-19

## 2017-10-19 ENCOUNTER — APPOINTMENT (EMERGENCY)
Dept: RADIOLOGY | Facility: HOSPITAL | Age: 77
DRG: 282 | End: 2017-10-19
Payer: MEDICARE

## 2017-10-19 DIAGNOSIS — I21.4 NSTEMI (NON-ST ELEVATED MYOCARDIAL INFARCTION) (HCC): Primary | ICD-10-CM

## 2017-10-19 DIAGNOSIS — R07.9 CHEST PAIN: ICD-10-CM

## 2017-10-19 LAB
ANION GAP SERPL CALCULATED.3IONS-SCNC: 14 MMOL/L (ref 4–13)
BASOPHILS # BLD AUTO: 0 THOUSANDS/ΜL (ref 0–0.1)
BASOPHILS NFR BLD AUTO: 0 % (ref 0–1)
BUN SERPL-MCNC: 20 MG/DL (ref 5–25)
CALCIUM SERPL-MCNC: 9.8 MG/DL (ref 8.3–10.1)
CHLORIDE SERPL-SCNC: 101 MMOL/L (ref 100–108)
CO2 SERPL-SCNC: 21 MMOL/L (ref 21–32)
CREAT SERPL-MCNC: 0.82 MG/DL (ref 0.6–1.3)
EOSINOPHIL # BLD AUTO: 0 THOUSAND/ΜL (ref 0–0.61)
EOSINOPHIL NFR BLD AUTO: 0 % (ref 0–6)
ERYTHROCYTE [DISTWIDTH] IN BLOOD BY AUTOMATED COUNT: 13.3 % (ref 11.6–15.1)
GFR SERPL CREATININE-BSD FRML MDRD: 69 ML/MIN/1.73SQ M
GLUCOSE SERPL-MCNC: 216 MG/DL (ref 65–140)
GLUCOSE SERPL-MCNC: 327 MG/DL (ref 65–140)
HCT VFR BLD AUTO: 43.5 % (ref 37–47)
HGB BLD-MCNC: 14.6 G/DL (ref 12–16)
LYMPHOCYTES # BLD AUTO: 1.2 THOUSANDS/ΜL (ref 0.6–4.47)
LYMPHOCYTES NFR BLD AUTO: 8 % (ref 14–44)
MCH RBC QN AUTO: 30.7 PG (ref 27–31)
MCHC RBC AUTO-ENTMCNC: 33.5 G/DL (ref 31.4–37.4)
MCV RBC AUTO: 92 FL (ref 82–98)
MONOCYTES # BLD AUTO: 0.1 THOUSAND/ΜL (ref 0.17–1.22)
MONOCYTES NFR BLD AUTO: 0 % (ref 4–12)
NEUTROPHILS # BLD AUTO: 14.3 THOUSANDS/ΜL (ref 1.85–7.62)
NEUTS SEG NFR BLD AUTO: 92 % (ref 43–75)
NRBC BLD AUTO-RTO: 0 /100 WBCS
NT-PROBNP SERPL-MCNC: 342 PG/ML
PLATELET # BLD AUTO: 217 THOUSANDS/UL (ref 130–400)
PLATELET BLD QL SMEAR: ADEQUATE
PMV BLD AUTO: 9.6 FL (ref 8.9–12.7)
POTASSIUM SERPL-SCNC: 3.8 MMOL/L (ref 3.5–5.3)
RBC # BLD AUTO: 4.75 MILLION/UL (ref 4.2–5.4)
SODIUM SERPL-SCNC: 136 MMOL/L (ref 136–145)
TROPONIN I SERPL-MCNC: 0.07 NG/ML
TROPONIN I SERPL-MCNC: 11.08 NG/ML
TROPONIN I SERPL-MCNC: 5.04 NG/ML
TROPONIN I SERPL-MCNC: 9.96 NG/ML
TSH SERPL DL<=0.05 MIU/L-ACNC: 0.76 UIU/ML (ref 0.36–3.74)
TSH SERPL DL<=0.05 MIU/L-ACNC: 0.8 UIU/ML (ref 0.36–3.74)
WBC # BLD AUTO: 15.6 THOUSAND/UL (ref 4.8–10.8)

## 2017-10-19 PROCEDURE — 84484 ASSAY OF TROPONIN QUANT: CPT | Performed by: EMERGENCY MEDICINE

## 2017-10-19 PROCEDURE — 36415 COLL VENOUS BLD VENIPUNCTURE: CPT | Performed by: EMERGENCY MEDICINE

## 2017-10-19 PROCEDURE — 80048 BASIC METABOLIC PNL TOTAL CA: CPT | Performed by: EMERGENCY MEDICINE

## 2017-10-19 PROCEDURE — 83880 ASSAY OF NATRIURETIC PEPTIDE: CPT | Performed by: EMERGENCY MEDICINE

## 2017-10-19 PROCEDURE — 84484 ASSAY OF TROPONIN QUANT: CPT | Performed by: INTERNAL MEDICINE

## 2017-10-19 PROCEDURE — 99285 EMERGENCY DEPT VISIT HI MDM: CPT

## 2017-10-19 PROCEDURE — 85025 COMPLETE CBC W/AUTO DIFF WBC: CPT | Performed by: EMERGENCY MEDICINE

## 2017-10-19 PROCEDURE — 96372 THER/PROPH/DIAG INJ SC/IM: CPT

## 2017-10-19 PROCEDURE — 93005 ELECTROCARDIOGRAM TRACING: CPT

## 2017-10-19 PROCEDURE — 84443 ASSAY THYROID STIM HORMONE: CPT | Performed by: INTERNAL MEDICINE

## 2017-10-19 PROCEDURE — 93005 ELECTROCARDIOGRAM TRACING: CPT | Performed by: EMERGENCY MEDICINE

## 2017-10-19 PROCEDURE — 87081 CULTURE SCREEN ONLY: CPT | Performed by: FAMILY MEDICINE

## 2017-10-19 PROCEDURE — 71020 HB CHEST X-RAY 2VW FRONTAL&LATL: CPT

## 2017-10-19 PROCEDURE — 82948 REAGENT STRIP/BLOOD GLUCOSE: CPT

## 2017-10-19 RX ORDER — DOCUSATE SODIUM 100 MG/1
100 CAPSULE, LIQUID FILLED ORAL 2 TIMES DAILY
Status: DISCONTINUED | OUTPATIENT
Start: 2017-10-19 | End: 2017-10-20 | Stop reason: HOSPADM

## 2017-10-19 RX ORDER — MORPHINE SULFATE 2 MG/ML
1 INJECTION, SOLUTION INTRAMUSCULAR; INTRAVENOUS
Status: DISCONTINUED | OUTPATIENT
Start: 2017-10-19 | End: 2017-10-20 | Stop reason: HOSPADM

## 2017-10-19 RX ORDER — METOPROLOL TARTRATE 5 MG/5ML
5 INJECTION INTRAVENOUS ONCE
Status: COMPLETED | OUTPATIENT
Start: 2017-10-19 | End: 2017-10-19

## 2017-10-19 RX ORDER — 0.9 % SODIUM CHLORIDE 0.9 %
3 VIAL (ML) INJECTION AS NEEDED
Status: DISCONTINUED | OUTPATIENT
Start: 2017-10-19 | End: 2017-10-20 | Stop reason: HOSPADM

## 2017-10-19 RX ORDER — ACETAMINOPHEN 325 MG/1
650 TABLET ORAL EVERY 6 HOURS PRN
Status: DISCONTINUED | OUTPATIENT
Start: 2017-10-19 | End: 2017-10-20 | Stop reason: HOSPADM

## 2017-10-19 RX ORDER — OXYCODONE HYDROCHLORIDE AND ACETAMINOPHEN 5; 325 MG/1; MG/1
2 TABLET ORAL EVERY 6 HOURS PRN
Status: DISCONTINUED | OUTPATIENT
Start: 2017-10-19 | End: 2017-10-20 | Stop reason: HOSPADM

## 2017-10-19 RX ORDER — CHLORAL HYDRATE 500 MG
1000 CAPSULE ORAL 2 TIMES DAILY
Status: DISCONTINUED | OUTPATIENT
Start: 2017-10-19 | End: 2017-10-20 | Stop reason: HOSPADM

## 2017-10-19 RX ORDER — ONDANSETRON 2 MG/ML
4 INJECTION INTRAMUSCULAR; INTRAVENOUS EVERY 6 HOURS PRN
Status: DISCONTINUED | OUTPATIENT
Start: 2017-10-19 | End: 2017-10-20 | Stop reason: HOSPADM

## 2017-10-19 RX ORDER — CALCIUM CARBONATE 200(500)MG
1000 TABLET,CHEWABLE ORAL DAILY PRN
Status: DISCONTINUED | OUTPATIENT
Start: 2017-10-19 | End: 2017-10-20 | Stop reason: HOSPADM

## 2017-10-19 RX ORDER — METOPROLOL SUCCINATE 50 MG/1
50 TABLET, EXTENDED RELEASE ORAL EVERY MORNING
Status: DISCONTINUED | OUTPATIENT
Start: 2017-10-20 | End: 2017-10-19

## 2017-10-19 RX ORDER — ASCORBIC ACID 500 MG
1000 TABLET ORAL 2 TIMES DAILY
Status: DISCONTINUED | OUTPATIENT
Start: 2017-10-19 | End: 2017-10-20 | Stop reason: HOSPADM

## 2017-10-19 RX ORDER — ASPIRIN 81 MG/1
324 TABLET, CHEWABLE ORAL ONCE
Status: COMPLETED | OUTPATIENT
Start: 2017-10-19 | End: 2017-10-19

## 2017-10-19 RX ORDER — ATORVASTATIN CALCIUM 40 MG/1
40 TABLET, FILM COATED ORAL
Status: DISCONTINUED | OUTPATIENT
Start: 2017-10-19 | End: 2017-10-20 | Stop reason: HOSPADM

## 2017-10-19 RX ORDER — ASPIRIN 81 MG/1
81 TABLET, CHEWABLE ORAL DAILY
Status: DISCONTINUED | OUTPATIENT
Start: 2017-10-20 | End: 2017-10-20 | Stop reason: HOSPADM

## 2017-10-19 RX ADMIN — ASPIRIN 81 MG 324 MG: 81 TABLET ORAL at 12:32

## 2017-10-19 RX ADMIN — Medication 1000 MG: at 17:15

## 2017-10-19 RX ADMIN — OXYCODONE HYDROCHLORIDE AND ACETAMINOPHEN 1000 MG: 500 TABLET ORAL at 17:15

## 2017-10-19 RX ADMIN — NITROGLYCERIN 1 INCH: 20 OINTMENT TOPICAL at 13:46

## 2017-10-19 RX ADMIN — BIMATOPROST 1 DROP: 0.1 SOLUTION/ DROPS OPHTHALMIC at 21:55

## 2017-10-19 RX ADMIN — METOPROLOL TARTRATE 5 MG: 5 INJECTION INTRAVENOUS at 16:08

## 2017-10-19 RX ADMIN — METOPROLOL TARTRATE 5 MG: 5 INJECTION INTRAVENOUS at 14:00

## 2017-10-19 RX ADMIN — INSULIN LISPRO 2 UNITS: 100 INJECTION, SOLUTION INTRAVENOUS; SUBCUTANEOUS at 17:55

## 2017-10-19 RX ADMIN — METOPROLOL TARTRATE 25 MG: 25 TABLET ORAL at 21:49

## 2017-10-19 RX ADMIN — ATORVASTATIN CALCIUM 40 MG: 40 TABLET, FILM COATED ORAL at 16:08

## 2017-10-19 RX ADMIN — DOCUSATE SODIUM 100 MG: 100 CAPSULE, LIQUID FILLED ORAL at 17:22

## 2017-10-19 RX ADMIN — Medication 1 TABLET: at 17:54

## 2017-10-19 RX ADMIN — ENOXAPARIN SODIUM 90 MG: 100 INJECTION SUBCUTANEOUS at 13:47

## 2017-10-19 RX ADMIN — MORPHINE SULFATE 1 MG: 2 INJECTION, SOLUTION INTRAMUSCULAR; INTRAVENOUS at 16:30

## 2017-10-19 RX ADMIN — MORPHINE SULFATE 1 MG: 2 INJECTION, SOLUTION INTRAMUSCULAR; INTRAVENOUS at 17:14

## 2017-10-19 NOTE — CONSULTS
Consultation - Cardiology   Roz Braun 65 y o  female MRN: [id number]  Unit/Bed#: [address] 415[address]01 Encounter: [id number]    Assessment:   1  Chest pain most likely secondary to ACS  2  Uncontrolled hypertension  3  Hyperlipidemia  4  History of uncontrolled diabetes mellitus  5  Moderate obesity  6  Fibromyalgia with gait dysfunction    Plan:        Monitor on tele  Serial EKGs  Serial cardiac enzymes  Aspirin  Metoprolol IV and p o  Nitro paste  Lovenox 1 milligram/kg q 12h hourly  P r n  morphine for chest pain  And patient will be scheduled for cardiac catheterization tomorrow morning at [facility]   If she she becomes more symptomatic and has EKG changes or has significant increase in troponin she may need to be transferred earlier  Discussed with patient and her family member that agree with the plan  Spoke to medical team   Scheduled at [facility]  She need to be transferred under slim service  Spoke to resident and nursing staff  Discussed with patient and patient's family members  Patient is Voodoo does not want any blood transfusions  She does understand that it puts her at high risk if during cardiac catheterization we had any complications or bleeding  History of Present Illness    Physician Requesting Consult: Dani Daugherty DO    Reason for Consult / Principal Problem:  Chest pain    Consults    HPI: Roz Braun is a 65y o  year old female who presents with chest pain  Patient was initially scheduled to have cardiac catheterization done today however she refused to have cardiac catheterization done here as she felt if she has some blockage she need to be transferred  She went home and started doing that some work when she started having left-sided chest pain and came to the ER  She describes the pain as constant burning like in the front of the chest   Not accompanied with any shortness of breath  Her pain got better with IV metoprolol and nitro    Currently she is feeling some pressure but no chest pain  Her initial troponin is 0 07  She has no fever no chills no nausea no vomiting no other is significant complaint  She had history of fibromyalgia and she has constant pain and multiple sites  Her other cardiac risk factor includes history of hypertension, hyperlipidemia, noncompliance, diabetes mellitus, fibromyalgia  Review of Systems   Constitutional: Negative for activity change, chills, diaphoresis, fever and unexpected weight change  HENT: Negative for congestion  Eyes: Negative for discharge and redness  Respiratory: Negative for cough, chest tightness, shortness of breath and wheezing  Cardiovascular: Positive for chest pain  Negative for palpitations and leg swelling  Very nervous and had high blood pressure   Gastrointestinal: Negative for abdominal pain, diarrhea and nausea  Endocrine: Negative  Genitourinary: Negative for decreased urine volume and urgency  Musculoskeletal: Negative  Negative for arthralgias, back pain and gait problem  Skin: Negative for rash and wound  Allergic/Immunologic: Negative  Neurological: Negative for dizziness, seizures, syncope, weakness, light-headedness and headaches  Hematological: Negative  Psychiatric/Behavioral: Negative for agitation and confusion  The patient is nervous/anxious  Historical Information   Past Medical History:   Diagnosis Date    Arthritis     DJD both knees-has fallen on the knees several times    Diabetes mellitus ([facility] [address] )     Fall with injury 02/15/2017    fell at home secondary to macular degeneration vision issues, neck pain currently    Fibromyalgia     had for many years   may have polymyalgia    Fibromyalgia, primary     Frequent falls 05/2017    injured left shoulder-recent MRI    Glaucoma     both eyes    Herniated cervical disc     Hyperlipidemia     Hypertension     Irritable bowel syndrome     Liver disease     " fatty liver"    Lumbar herniated disc     with sciatica    Macular degeneration     both eyes, gets injections    Neuropathy     in the feet? falls on occ    Obesity     Pancreas disorder     "slow leak"    Shortness of breath     Urinary incontinence      Past Surgical History:   Procedure Laterality Date    APPENDECTOMY      CATARACT EXTRACTION      CHOLECYSTECTOMY      Lap    COLONOSCOPY      COLONOSCOPY N/A 2/21/2017    Procedure: COLONOSCOPY and biopsy, snare polypectomy;  Surgeon: Sandra Haddad MD;  Location: [facility] GI LAB; Service:     DILATION AND CURETTAGE, DIAGNOSTIC / THERAPEUTIC      ESOPHAGOGASTRODUODENOSCOPY N/A 2/21/2017    Procedure: ESOPHAGOGASTRODUODENOSCOPY (EGD) and biopsy ;  Surgeon: Sandra Haddad MD;  Location: [facility] GI LAB; Service:     HEMORRHOID SURGERY      in her 18's    HYSTERECTOMY      complete    OOPHORECTOMY Left     age 21 then hysterectomy    PA REMV CATARACT EXTRACAP,INSERT LENS Right 5/19/2016    Procedure: EXTRACTION EXTRACAPSULAR CATARACT PHACO INTRAOCULAR LENS (IOL); Surgeon: Humberto Powell MD;  Location: [facility] MAIN OR;  Service: Ophthalmology     [address] Left 10/12/2017    Procedure: EXTRACTION EXTRACAPSULAR CATARACT PHACO INTRAOCULAR LENS (IOL);   Surgeon: Humberto Powell MD;  Location: [facility] MAIN OR;  Service: Ophthalmology    TONSILLECTOMY      age 24     History   Alcohol use Not on file     Comment: occ     History   Drug use: Unknown     History   Smoking Status    Never Smoker   Smokeless Tobacco    Never Used     Family History:   Family History   Problem Relation Age of Onset    Coronary artery disease Father     Cancer Father      esophagus, trach    Peripheral vascular disease Father     Heart disease Sister      MI, in her 62s    Cancer Brother      bowel CA exp age 52    Obesity Brother     Heart disease Brother 64     Massive MI    Cancer Maternal Grandmother      bowel CA    Heart disease Mother      CHF Meds/Allergies    PTA meds:    Prescriptions Prior to Admission   Medication    ascorbic acid (VITAMIN C) 500 mg tablet    bimatoprost (LUMIGAN) 0 01 % ophthalmic drops    metFORMIN (GLUCOPHAGE) 850 mg tablet    metoprolol succinate (TOPROL-XL) 50 mg 24 hr tablet    Multiple Vitamins-Minerals (PRESERVISION AREDS 2 PO)    rosuvastatin (CRESTOR) 20 MG tablet    Omega-3 Fatty Acids (FISH OIL PO)    oxyCODONE-acetaminophen (PERCOCET)  mg per tablet      Allergies   Allergen Reactions    Combigan [Brimonidine Tartrate-Timolol] Other (See Comments)     Eyes became red    Iohexol Hives    Other Hives     Cat Scan Dye    Lyrica [Pregabalin]      Became "sick" so she avoids       Current Facility-Administered Medications:     acetaminophen (TYLENOL) tablet 650 mg, 650 mg, Oral, Q6H PRN, Debbi Hoang MD    ascorbic acid (VITAMIN C) tablet 1,000 mg, 1,000 mg, Oral, BID, Debbi Hoang MD  [facility]  Nathaniel Faklester ON 10/20/2017] aspirin chewable tablet 81 mg, 81 mg, Oral, Daily, Debbi Hoang MD    atorvastatin (LIPITOR) tablet 40 mg, 40 mg, Oral, Daily With Merna Gomez MD, 40 mg at 10/19/17 1608    bimatoprost (LUMIGAN) 0 01 % ophthalmic solution 1 drop, 1 drop, Both Eyes, HS, Debbi Hoang MD    calcium carbonate (TUMS) chewable tablet 1,000 mg, 1,000 mg, Oral, Daily PRN, Black Alvarenga MD    docusate sodium (COLACE) capsule 100 mg, 100 mg, Oral, BID, Debbi Hoang MD  [facility]  [START ON 10/20/2017] enoxaparin (LOVENOX) subcutaneous injection 90 mg, 1 mg/kg, Subcutaneous, Q12H [address], Debbi Hoang MD    fish oil capsule 1,000 mg, 1,000 mg, Oral, BID, Debbi Hoang MD    insulin lispro (HumaLOG) 100 units/mL subcutaneous injection 1-6 Units, 1-6 Units, Subcutaneous, Q6H [address] **AND** Fingerstick Glucose (POCT), , , Q6H, Debbi Hoang MD    metoprolol tartrate (LOPRESSOR) tablet 25 mg, 25 mg, Oral, Q12H [address], Debbi Hoang MD    morphine injection 1 mg, 1 mg, Intravenous, Q3H PRN, Debbi MARKS Francois Howard MD    multivitamin-minerals (CENTRUM) tablet 1 tablet, 1 tablet, Oral, Daily, Debbi Giordano MD    ondansetron [facility]) injection 4 mg, 4 mg, Intravenous, Q6H PRN, Tequila Escalera MD    oxyCODONE-acetaminophen (PERCOCET) 5-325 mg per tablet 2 tablet, 2 tablet, Oral, Q6H PRN, Prudence MD Jw    Insert peripheral IV, , , Once **AND** sodium chloride (PF) 0 9 % injection 3 mL, 3 mL, Intravenous, PRN, Sophronia Earthly, DO    VTE Pharmacologic Prophylaxis:    Lovenox    Objective:   Vitals: Blood pressure 157/74, pulse 81, temperature 98 1 °F (36 7 °C), temperature source Oral, resp  rate 20, height 5' 2" (1 575 m), weight 88 5 kg (195 lb), SpO2 92 %, not currently breastfeeding  Orthostatic Blood Pressures    Flowsheet Row Most Recent Value   Blood Pressure  157/74 filed at 10/19/2017 1546   Patient Position - Orthostatic VS  Lying filed at 10/19/2017 1313        No intake or output data in the 24 hours ending 10/19/17 1622  Body mass index is 35 67 kg/m²  Invasive Devices          No matching active lines, drains, or airways            Physical Exam:   Physical Exam    Neurologic:  Alert & oriented x 3, no new focal deficits, Not in any acute distress,  Constitutional:  Well developed, well nourished, non-toxic appearance   Eyes:  Pupil equal and reacting to light, conjunctiva normal   HENT:  Atraumatic, oropharynx moist, Neck- normal range of motion, no tenderness, supple   Respiratory:  Bilateral air entry, mostly clear to auscultation  Cardiovascular: S1-S2 regular with a 2/6 ejection systolic murmur and S4 is present  GI:  Soft, nondistended, normal bowel sounds, nontender, no hepatosplenomegaly appreciated  Musculoskeletal:  No edema, no tenderness, no deformities     Skin:  Well hydrated, no rash   Lymphatic:  No lymphadenopathy noted   Extremities:  Mild edema and distal pulses are present    Labs:   Troponins:   Results from last 7 days  Lab Units 10/19/17  1228   TROPONIN I ng/mL 0 07*       CBC with diff:   Results from last 7 days  Lab Units 10/19/17  1228   WBC Thousand/uL 15 60*   HEMOGLOBIN g/dL 14 6   HEMATOCRIT % 43 5   MCV fL 92   PLATELETS Thousands/uL 217   MCH pg 30 7   MCHC g/dL 33 5   RDW % 13 3   MPV fL 9 6   NRBC AUTO /100 WBCs 0       CMP:   Results from last 7 days  Lab Units 10/19/17  1351   SODIUM mmol/L 136   POTASSIUM mmol/L 3 8   CHLORIDE mmol/L 101   CO2 mmol/L 21   ANION GAP mmol/L 14*   BUN mg/dL 20   CREATININE mg/dL 0 82   GLUCOSE RANDOM mg/dL 327*   CALCIUM mg/dL 9 8   EGFR ml/min/1 73sq m 69       NT-proBNP:   Recent Labs      10/19/17   1351   NTBNP  342        Imaging & Testing   Cardiac testing:   No results found for this or any previous visit  In June 2017 she had a nuclear stress test done which was nonischemic    Imaging: I have personally reviewed pertinent reports  X-ray Chest 2 Views    Result Date: 10/19/2017  Narrative: CHEST INDICATION:  Chest pain COMPARISON:  09/05/2017 VIEWS:  Frontal and lateral projections IMAGES:  2 FINDINGS:     Cardiomediastinal silhouette appears upper limits of normal in size  The lungs are clear  No pneumothorax or pleural effusion  No evidence of heart failure  Visualized osseous structures appear within normal limits for the patient's age  Impression: No active pulmonary disease  Workstation performed: [id number]     Dxa Bone Density Spine Hip And Pelvis    Result Date: 9/26/2017  Narrative: CENTRAL  DXA SCAN CLINICAL HISTORY: 50-year-old woman  Menopause in her late 46s  OTHER RISK FACTORS: History of fracture following a low level trauma  TECHNIQUE: Bone densitometry was performed using a [employer] Horizon C bone densitometer  Regions of interest appear properly placed  COMPARISON: There are no prior DXA studies performed on this unit for comparison   RESULTS: LUMBAR SPINE L1-L4 (L2): BMD  1 488  gm/cm2 / T-score 2 4 / Z score 4 2 (Lumbar levels within parentheses represent vertebrae excluded from analysis due to local structural abnormalities or artifact)  These values are artifactually elevated due to degenerative sclerosis and osteophytosis  LEFT TOTAL HIP: BMD 1 267  gm/cm2 / T-score 2 1 / Z score 4 0 LEFT FEMORAL NECK: BMD 1 298  gm/cm2 / T score 1 9 / Z score 3 9 RIGHT TOTAL HIP: BMD  1 002  gm/cm2 / T-score 0 0 / Z score 1 9 RIGHT FEMORAL NECK: BMD  0 971  gm/cm2 / T score -0 5 / Z score 1 6     Impression: 1  Normal bone density  2   The 10 year risk of hip fracture is 1 6% with the 10 year risk of major osteoporotic fracture being 13 1% as calculated by the [facility]/WHO fracture risk assessment tool (FRAX)  3   The current NOF guidelines recommend treating patients with a T-score of -2 5 or less in the lumbar spine or hips, or in post-menopausal women and men over the age of 48 with low bone mass (osteopenia) and a FRAX 10 year risk score of >3% for hip fracture and/or >20% for major osteoporotic fracture  4   A daily intake of at least 1200 mg calcium and vitamin D 800- 1000 IU, as well as weight bearing and muscle strengthening exercise, fall prevention and avoidance of tobacco and excessive alcohol as preventive measurements are suggested  5   Follow-up DXA in two years is recommended for most patients  A one year follow-up DXA is recommended after initiation or change in therapy for osteoporosis  More frequent evaluation is also appropriate for patients with conditions associated with rapid bone loss, such as glucocorticoid therapy  The FRAX tool has not been validated in patients currently or previously treated with pharmacotherapy for osteoporosis  In such patients, clinical judgment must be exercised in interpreting FRAX scores  It is not appropriate to use FRAX to monitor treatment response   WHO CLASSIFICATION: Normal (a T-score of -1 0 or higher) Low bone mineral density (a T-score of less than -1 0 but higher than -2 5) Osteoporosis (a T-score of -2 5 or less) Severe osteoporosis (a T-score of -2 5 or less with a fragility fracture) Workstation performed: [id number]     Mammo Screening Bilateral W Cad    Result Date: 9/26/2017  Narrative: Patient History: Patient is postmenopausal  Patient's BMI is 35 1  Reason for exam: screening, asymptomatic  Screening Mammo Screening Bilateral W CAD: September 26, 2017 - Check In #: [de-identified] Bilateral CC and MLO view(s) were taken  Technologist: CEDRIC Gregg Prior study comparison: March 10, 2016, mammo screening bilateral W CAD performed at [place] [address]  November 4, 2014, bilateral screening mammogram performed at [place] [address]  December 8, 2011, left breast diagnostic mammogram performed at [place] [address]  November 22, 2011, left breast screening mammogram performed at [place] [address]  April 14, 2009, screening mammogram performed at [place] [address]  The breast tissue is almost entirely fat  Bilateral digital mammography is performed  No dominant soft tissue mass, architectural distortion or suspicious calcifications are noted  The skin and nipple contours are within normal limits  Scattered benign appearing calcifications are noted  No evidence of malignancy  No significant changes when compared to prior studies  IMPRESSION:1  No evidence of malignancy  2   No significant change when compared with the prior study  ACR BI-RADS® Assessments: BiRad:2 - Benign Recommendation: Routine screening mammogram of both breasts in 1 year  Analyzed by CAD The patient is scheduled in a reminder system for screening mammography  8-10% of cancers will be missed on mammography  Management of a palpable abnormality must be based on clinical grounds  Patients will be notified of their results via letter from our facility  Accredited by [employer] of Radiology and FDA   Transcription Location: NAYELI Rodriguez 98: [id number] Risk Value(s): Tyrer-Cuzick 10 Year: 2 300%, Tyrer-Felizzick Lifetime: 2 300%, Myriad Table: 1 5%, APOLONIA 5 Year: 1 4%, NCI Lifetime: 2 5%    EKG/ Monitor: Personally reviewed  Normal sinus rhythm with nonspecific ST changes  Heart rate was around 106 beats per minute on 1st EKG and 80 on 2nd EKG  Code Status: Level 1 - Full Code  Advance Directive and Living Will: Yes    Power of :    POLST:        Dr Serge Ross MD [facility] [place]      "This note has been constructed using a voice recognition system  Therefore there may be syntax, spelling, and/or grammatical errors   Please call if you have any questions  "

## 2017-10-19 NOTE — PLAN OF CARE
DISCHARGE PLANNING     Discharge to home or other facility with appropriate resources Progressing          INFECTION - ADULT     Absence or prevention of progression during hospitalization Progressing          Knowledge Deficit     Patient/family/caregiver demonstrates understanding of disease process, treatment plan, medications, and discharge instructions Progressing          PAIN - ADULT     Verbalizes/displays adequate comfort level or baseline comfort level Progressing          SAFETY ADULT     Patient will remain free of falls Progressing     Maintain or return to baseline ADL function Progressing     Maintain or return mobility status to optimal level Progressing

## 2017-10-19 NOTE — ED PROVIDER NOTES
History  Chief Complaint   Patient presents with    Chest Pain     started with chest pain around 9:30am this morning, pain radiates to left arm       History provided by:  Patient and relative  Chest Pain   Pain location:  Substernal area and epigastric  Pain quality: aching and burning    Pain radiates to:  Does not radiate  Pain severity:  Moderate  Onset quality:  Gradual  Timing:  Intermittent  Progression:  Waxing and waning  Chronicity:  New  Context: breathing and movement    Relieved by:  Nothing  Worsened by:  Nothing tried  Ineffective treatments:  None tried  Associated symptoms: no abdominal pain, no cough, no dizziness, no dysphagia, no fever, no headache, no nausea, no numbness, no shortness of breath and no weakness    Associated symptoms comment:  59-year-old female presents emergency department intermittent chest discomfort worse with exertion  Noted with some mild shortness of breath associated with this as well  Prior to Admission Medications   Prescriptions Last Dose Informant Patient Reported? Taking? Multiple Vitamins-Minerals (PRESERVISION AREDS 2 PO) 10/18/2017 at Unknown time  Yes Yes   Sig: Take by mouth 2 (two) times a day  Omega-3 Fatty Acids (FISH OIL PO)   Yes No   Sig: Take 1,000 mg by mouth 2 (two) times a day  ascorbic acid (VITAMIN C) 500 mg tablet 10/18/2017 at Unknown time  Yes Yes   Sig: Take 1,000 mg by mouth 2 (two) times a day     bimatoprost (LUMIGAN) 0 01 % ophthalmic drops 10/18/2017 at Unknown time  Yes Yes   Sig: Administer 1 drop to both eyes daily at bedtime     metFORMIN (GLUCOPHAGE) 850 mg tablet 10/18/2017 at Unknown time  Yes Yes   Sig: Take 850 mg by mouth 2 (two) times a day with meals  metoprolol succinate (TOPROL-XL) 50 mg 24 hr tablet 10/18/2017 at Unknown time  Yes Yes   Sig: Take 50 mg by mouth every morning     oxyCODONE-acetaminophen (PERCOCET)  mg per tablet  Self Yes No   Sig: Take 1 tablet by mouth every 6 (six) hours as needed for severe pain May take 1/2 tab when at home  If going out will take 10/325 mg    rosuvastatin (CRESTOR) 20 MG tablet 10/18/2017 at Unknown time  Yes Yes   Sig: Take 20 mg by mouth daily at bedtime  Facility-Administered Medications: None       Past Medical History:   Diagnosis Date    Arthritis     DJD both knees-has fallen on the knees several times    Diabetes mellitus ([facility])     Fall with injury 02/15/2017    fell at home secondary to macular degeneration vision issues, neck pain currently    Fibromyalgia     had for many years  may have polymyalgia    Fibromyalgia, primary     Frequent falls 05/2017    injured left shoulder-recent MRI    Glaucoma     both eyes    Herniated cervical disc     Hyperlipidemia     Hypertension     Irritable bowel syndrome     Liver disease     " fatty liver"    Lumbar herniated disc     with sciatica    Macular degeneration     both eyes, gets injections    Neuropathy     in the feet? falls on occ    Obesity     Pancreas disorder     "slow leak"    Shortness of breath     Urinary incontinence        Past Surgical History:   Procedure Laterality Date    APPENDECTOMY      CATARACT EXTRACTION      CHOLECYSTECTOMY      Lap    COLONOSCOPY      COLONOSCOPY N/A 2/21/2017    Procedure: COLONOSCOPY and biopsy, snare polypectomy;  Surgeon: Isa Todd MD;  Location: [place] [address] GI LAB; Service:     DILATION AND CURETTAGE, DIAGNOSTIC / THERAPEUTIC      ESOPHAGOGASTRODUODENOSCOPY N/A 2/21/2017    Procedure: ESOPHAGOGASTRODUODENOSCOPY (EGD) and biopsy ;  Surgeon: Isa Todd MD;  Location: [place] [address] GI LAB; Service:     HEMORRHOID SURGERY      in her 18's    HYSTERECTOMY      complete    OOPHORECTOMY Left     age 21 then hysterectomy    RI REMV CATARACT EXTRACAP,INSERT LENS Right 5/19/2016    Procedure: EXTRACTION EXTRACAPSULAR CATARACT PHACO INTRAOCULAR LENS (IOL);   Surgeon: Kate Pérez MD;  Location: [facility] MAIN OR;  Service: Ophthalmology     [facility] CATARACT EXTRACAP,INSERT LENS Left 10/12/2017    Procedure: EXTRACTION EXTRACAPSULAR CATARACT PHACO INTRAOCULAR LENS (IOL); Surgeon: Mora Hernandez MD;  Location: [facility] MAIN OR;  Service: Ophthalmology    TONSILLECTOMY      age 24       Family History   Problem Relation Age of Onset    Coronary artery disease Father     Cancer Father      esophagus, trach    Peripheral vascular disease Father     Heart disease Sister      MI, in her 62s    Cancer Brother      bowel CA exp age 52    Obesity Brother     Heart disease Brother 64     Massive MI    Cancer Maternal Grandmother      bowel CA    Heart disease Mother      CHF     I have reviewed and agree with the history as documented  Social History   Substance Use Topics    Smoking status: Never Smoker    Smokeless tobacco: Never Used    Alcohol use Yes      Comment: occ        Review of Systems   Constitutional: Negative for chills and fever  HENT: Negative for rhinorrhea, sore throat and trouble swallowing  Eyes: Negative for pain  Respiratory: Negative for cough, shortness of breath, wheezing and stridor  Cardiovascular: Positive for chest pain  Negative for leg swelling  Gastrointestinal: Negative for abdominal pain, diarrhea and nausea  Endocrine: Negative for polyuria  Genitourinary: Negative for dysuria, flank pain and urgency  Musculoskeletal: Negative for joint swelling, myalgias and neck stiffness  Skin: Negative for rash  Allergic/Immunologic: Negative for immunocompromised state  Neurological: Negative for dizziness, syncope, weakness, numbness and headaches  Psychiatric/Behavioral: Negative for confusion and suicidal ideas  All other systems reviewed and are negative        Physical Exam  ED Triage Vitals [10/19/17 1155]   Temperature Pulse Respirations Blood Pressure SpO2   98 6 °F (37 °C) (!) 114 22 144/92 100 %      Temp Source Heart Rate Source Patient Position - Orthostatic VS BP Location FiO2 (%)   Temporal Monitor Lying Right arm --      Pain Score       Worst Possible Pain           Physical Exam   Constitutional: She is oriented to person, place, and time  She appears well-developed and well-nourished  HENT:   Head: Normocephalic and atraumatic  Eyes: EOM are normal  Pupils are equal, round, and reactive to light  Neck: Normal range of motion  Neck supple  Cardiovascular: Normal rate and regular rhythm  Exam reveals no friction rub  No murmur heard  Pulmonary/Chest: Breath sounds normal  No respiratory distress  She has no wheezes  She has no rales  Abdominal: Soft  Bowel sounds are normal  She exhibits no distension  There is no tenderness  Musculoskeletal: Normal range of motion  She exhibits no edema or tenderness  Neurological: She is alert and oriented to person, place, and time  Skin: Skin is warm  No rash noted  Psychiatric: She has a normal mood and affect  Nursing note and vitals reviewed        ED Medications  Medications   sodium chloride (PF) 0 9 % injection 3 mL (not administered)   enoxaparin (LOVENOX) subcutaneous injection 90 mg (90 mg Subcutaneous Given 10/19/17 1347)   aspirin chewable tablet 324 mg (324 mg Oral Given 10/19/17 1232)   nitroglycerin (NITRO-BID) 2 % TD ointment 1 inch (1 inch Topical Given 10/19/17 1346)   metoprolol (LOPRESSOR) injection 5 mg (5 mg Intravenous Given 10/19/17 1400)       Diagnostic Studies  Labs Reviewed   CBC AND DIFFERENTIAL - Abnormal        Result Value Ref Range Status    WBC 15 60 (*) 4 80 - 10 80 Thousand/uL Final    Neutrophils Relative 92 (*) 43 - 75 % Final    Lymphocytes Relative 8 (*) 14 - 44 % Final    Monocytes Relative 0 (*) 4 - 12 % Final    Neutrophils Absolute 14 30 (*) 1 85 - 7 62 Thousands/µL Final    Monocytes Absolute 0 10 (*) 0 17 - 1 22 Thousand/µL Final    RBC 4 75  4 20 - 5 40 Million/uL Final    Hemoglobin 14 6  12 0 - 16 0 g/dL Final    Hematocrit 43 5  37 0 - 47 0 % Final    MCV 92  82 - 98 fL Final    MCH 30 7 27 0 - 31 0 pg Final    MCHC 33 5  31 4 - 37 4 g/dL Final    RDW 13 3  11 6 - 15 1 % Final    MPV 9 6  8 9 - 12 7 fL Final    Platelets 576  518 - 400 Thousands/uL Final    nRBC 0  /100 WBCs Final    Eosinophils Relative 0  0 - 6 % Final    Basophils Relative 0  0 - 1 % Final    Lymphocytes Absolute 1 20  0 60 - 4 47 Thousands/µL Final    Eosinophils Absolute 0 00  0 00 - 0 61 Thousand/µL Final    Basophils Absolute 0 00  0 00 - 0 10 Thousands/µL Final   TROPONIN I - Abnormal     Troponin I 0 07 (*) <=0 04 ng/mL Final    Comment:  Results indicate test should be repeated on new specimen collected within 4-6 hours of the original     Narrative:     [employer] Chemistry analyzer 99% cutoff is > 0 04 ng/mL in network labs    o cTnI 99% cutoff is useful only when applied to patients in the clinical setting of myocardial ischemia  o cTnI 99% cutoff should be interpreted in the context of clinical history, ECG findings and possibly cardiac imaging to establish correct diagnosis  o cTnI 99% cutoff may be suggestive but clearly not indicative of a coronary event without the clinical setting of myocardial ischemia  SMEAR REVIEW(PHLEBS DO NOT ORDER) - Normal    Platelet Estimate Adequate  Adequate Final   BASIC METABOLIC PANEL   NT-BNP PRO (BRAIN NATRIURETIC PEPTIDE)       X-ray chest 2 views   Final Result      No active pulmonary disease  Workstation performed: [id number]             Procedures  CriticalCare Time  Performed by: Will Jones  Authorized by: Will Jones     Critical care provider statement:     Critical care time (minutes):  80    Critical care time was exclusive of:  Separately billable procedures and treating other patients and teaching time    Critical care was necessary to treat or prevent imminent or life-threatening deterioration of the following conditions: NSTEMI with elevation in troponins and cardiac ischemia      Critical care was time spent personally by me on the following activities:  Blood draw for specimens, obtaining history from patient or surrogate, development of treatment plan with patient or surrogate, evaluation of patient's response to treatment, examination of patient, ordering and performing treatments and interventions, ordering and review of laboratory studies, ordering and review of radiographic studies, re-evaluation of patient's condition and review of old charts          Phone Contacts  ED Phone Contact    ED Course  ED Course                                MDM  Number of Diagnoses or Management Options  NSTEMI (non-ST elevated myocardial infarction) [facility]): new and requires workup  Diagnosis management comments: 1:14 PM  Spoke with Dr Kristen Zimmer, cardiology, recommend, inpatient eval, lovenox and possible transfer to to [facility] in the next 24-48 hours  Pain is still mildly present noted with tachycardia    1:59 PM   Spoke with the admitting team  Accepted the pt for admission  Spoke with the pt and they are in agreement to stay for further eval and admission  Lopressor to be given as well as EKG pending Dr Kristen Zimmer in the department evaluating the patient  Will admit to Medicine surgical tele       Amount and/or Complexity of Data Reviewed  Clinical lab tests: reviewed and ordered  Tests in the radiology section of CPT®: ordered and reviewed      The patient presented with a condition in which there was a high probability of imminent or life-threatening deterioration, and critical care services (excluding separately billable procedures) totalled  minutes (80)  Disposition  Final diagnoses:   NSTEMI (non-ST elevated myocardial infarction) [facility])     ED Disposition     ED Disposition Condition Comment    Admit        Follow-up Information    None       Patient's Medications   Discharge Prescriptions    No medications on file     No discharge procedures on file      ED Provider  Electronically Signed by       Macey Saravia DO  10/19/17 1400

## 2017-10-19 NOTE — H&P
H&P Exam - Nannette Montanez 65 y o  female MRN: [id number]    Unit/Bed#: TRAVIS Encounter: [id number]      Assessment/Plan     Assessment/Plan:    Patient will be admitted under the service of Dr Veronika Samuels with expected stay of <2 midnights  Plan of care and management discussed with Dr Veronika Samuels and agreed by her  Patient is a full code  Planned discharge to home once medically stable  #Chest pain likely secondary to ACS:   -Trop: 0 7, 5 04  -EKG: Sinus tachycardia with PACs  Low voltage QRS inferior infarct indeterminate age    -Continue monitoring on telemetry, serial troponin, and EKGs  -Continue ASA, Statin, Nitro, Metoprolol, Lovenox 1mg/kg q12hr, Morphine PRN chest pain  -Plan for cardiac catheterization tomorrow at [facility], will transfer out earlier if patient has EKG changes or worsening troponin or chest pain  -Dr Ava Leslie following    #Uncontrolled HTN:  -Received lopressor 5mg IV x 1 in ED with response  -Continue lopressor IV and po   -monitor vitals    #Uncontrolled T2DM:   -HgbA1c: 7 0 on 9/18/17  -Continue accucheks and coverage with ISS q6hr while NPO    #Fibromyalgia/DJD/Chronic pain:   -Resume home percocet    #IBS:   -No concerns for a flare at this time    #Urinary incontinence:   -stable    #Hx of cataracts  -recent cataract surgery  -patient will bring in eye drops from home    #[employer]  -does not accept blood products      Global: [email or web address], Lovenox 1mg/kg q12hr, NPO    History of Present Illness     HPI:  Nannette Montanez is a 65 y o  morbidly obese non-compliant F with history of Uncontrolled T2DM with neuropathy, HTN, PVD, dyslipidemia, DJD, fibromyalgia, IBS, urinary incontinence who presents with constant left-sided substernal pressure radiating to the left arm and aggravated with exertion and associated with nausea and sob since 0930 today  Patient states she has been experiencing similar pain for the past 2 months and was undergoing a cardiac work up   Patient was scheduled for a cardiac cath this morning which she refused as she was told that she would be transferred out should she need any intervention  Patient received lopressor 5 mg IV x 1, Nitropaste,  mg po x 1, 1mg/kg Lovenox in the ED and was seen by Dr Obdulia Joy  Patient continued with burning and chest pressure on my arrival to the ED  Initial troponin was 0 7  Review of Systems   Constitutional: Negative for chills, diaphoresis, fatigue and fever  HENT: Negative for congestion, sore throat and trouble swallowing  Eyes:        +Sensitivity to light (attributed to recent cataract surgery)   Respiratory: Positive for chest tightness and shortness of breath  Negative for cough and wheezing  Cardiovascular: Positive for chest pain  Negative for palpitations and leg swelling  Gastrointestinal: Positive for nausea  Negative for abdominal distention, abdominal pain, diarrhea and vomiting  Endocrine: Negative for polyuria  Genitourinary: Negative for dysuria  Musculoskeletal: Positive for arthralgias, back pain and myalgias  Negative for joint swelling  Skin: Negative for pallor  Neurological: Positive for headaches  Negative for dizziness, tremors, seizures, syncope, facial asymmetry, speech difficulty, light-headedness and numbness  Hematological: Does not bruise/bleed easily  Psychiatric/Behavioral: Negative for confusion  All other systems reviewed and are negative  Historical Information   Past Medical History:   Diagnosis Date    Arthritis     DJD both knees-has fallen on the knees several times    Diabetes mellitus ([facility] [address] )     Fall with injury 02/15/2017    fell at home secondary to macular degeneration vision issues, neck pain currently    Fibromyalgia     had for many years   may have polymyalgia    Fibromyalgia, primary     Frequent falls 05/2017    injured left shoulder-recent MRI    Glaucoma     both eyes    Herniated cervical disc     Hyperlipidemia     Hypertension     Irritable bowel syndrome     Liver disease     " fatty liver"    Lumbar herniated disc     with sciatica    Macular degeneration     both eyes, gets injections    Neuropathy     in the feet? falls on occ    Obesity     Pancreas disorder     "slow leak"    Shortness of breath     Urinary incontinence      Past Surgical History:   Procedure Laterality Date    APPENDECTOMY      CATARACT EXTRACTION      CHOLECYSTECTOMY      Lap    COLONOSCOPY      COLONOSCOPY N/A 2/21/2017    Procedure: COLONOSCOPY and biopsy, snare polypectomy;  Surgeon: Wolf Kapoor MD;  Location: [place] [address] GI LAB; Service:     DILATION AND CURETTAGE, DIAGNOSTIC / THERAPEUTIC      ESOPHAGOGASTRODUODENOSCOPY N/A 2/21/2017    Procedure: ESOPHAGOGASTRODUODENOSCOPY (EGD) and biopsy ;  Surgeon: Wolf Kapoor MD;  Location: [place] [address] GI LAB; Service:     HEMORRHOID SURGERY      in her 18's    HYSTERECTOMY      complete    OOPHORECTOMY Left     age 21 then hysterectomy    [place] REMV CATARACT EXTRACAP,INSERT LENS Right 5/19/2016    Procedure: EXTRACTION EXTRACAPSULAR CATARACT PHACO INTRAOCULAR LENS (IOL); Surgeon: Clarence Rangel MD;  Location[facility] [place] [facility] MAIN OR;  Service: Ophthalmology     [address] Left 10/12/2017    Procedure: EXTRACTION EXTRACAPSULAR CATARACT PHACO INTRAOCULAR LENS (IOL);   Surgeon: Clarnece Rangel MD;  Location: [place] [facility] MAIN OR;  Service: Ophthalmology    TONSILLECTOMY      age 24     Social History   History   Alcohol Use    Yes     Comment: occ     History   Drug Use No     History   Smoking Status    Never Smoker   Smokeless Tobacco    Never Used     Family History:   Family History   Problem Relation Age of Onset    Coronary artery disease Father     Cancer Father      esophagus, trach    Peripheral vascular disease Father     Heart disease Sister      MI, in her 62s    Cancer Brother      bowel CA exp age 52    Obesity Brother     Heart disease Brother 64     Massive MI    Cancer Maternal Grandmother      bowel CA    Heart disease Mother      CHF       Meds/Allergies   PTA meds:   Prior to Admission Medications   Prescriptions Last Dose Informant Patient Reported? Taking? Multiple Vitamins-Minerals (PRESERVISION AREDS 2 PO) 10/18/2017 at Unknown time  Yes Yes   Sig: Take by mouth 2 (two) times a day  Omega-3 Fatty Acids (FISH OIL PO)   Yes No   Sig: Take 1,000 mg by mouth 2 (two) times a day  ascorbic acid (VITAMIN C) 500 mg tablet 10/18/2017 at Unknown time  Yes Yes   Sig: Take 1,000 mg by mouth 2 (two) times a day     bimatoprost (LUMIGAN) 0 01 % ophthalmic drops 10/18/2017 at Unknown time  Yes Yes   Sig: Administer 1 drop to both eyes daily at bedtime     metFORMIN (GLUCOPHAGE) 850 mg tablet 10/18/2017 at Unknown time  Yes Yes   Sig: Take 850 mg by mouth 2 (two) times a day with meals  metoprolol succinate (TOPROL-XL) 50 mg 24 hr tablet 10/18/2017 at Unknown time  Yes Yes   Sig: Take 50 mg by mouth every morning  oxyCODONE-acetaminophen (PERCOCET)  mg per tablet  Self Yes No   Sig: Take 1 tablet by mouth every 6 (six) hours as needed for severe pain May take 1/2 tab when at home  If going out will take 10/325 mg    rosuvastatin (CRESTOR) 20 MG tablet 10/18/2017 at Unknown time  Yes Yes   Sig: Take 20 mg by mouth daily at bedtime  Facility-Administered Medications: None     Allergies   Allergen Reactions    Combigan [Brimonidine Tartrate-Timolol] Other (See Comments)     Eyes became red    Iohexol Hives    Other Hives     Cat Scan Dye    Lyrica [Pregabalin]      Became "sick" so she avoids       Objective   Vitals: Blood pressure (!) 177/77, pulse 75, temperature 98 6 °F (37 °C), temperature source Temporal, resp  rate 20, height 5' 2" (1 575 m), weight 88 5 kg (195 lb), SpO2 95 %, not currently breastfeeding      No intake or output data in the 24 hours ending 10/19/17 8557    Invasive Devices          No matching active lines, drains, or airways Physical Exam   Constitutional: She appears well-developed and well-nourished  No distress  HENT:   Head: Normocephalic and atraumatic  Mouth/Throat: No oropharyngeal exudate  Eyes: Pupils are equal, round, and reactive to light  No scleral icterus  Neck: Normal range of motion  Neck supple  No JVD present  Cardiovascular: Normal rate, regular rhythm, normal heart sounds and intact distal pulses  No murmur heard  Pulmonary/Chest: Effort normal and breath sounds normal  No respiratory distress  She has no wheezes  Abdominal: Soft  Bowel sounds are normal  She exhibits no distension  There is no tenderness  Musculoskeletal: Normal range of motion  She exhibits tenderness  Lymphadenopathy:     She has no cervical adenopathy  Neurological: She is alert  She has normal reflexes  She exhibits normal muscle tone  Skin: Skin is warm and dry  She is not diaphoretic  Psychiatric: She has a normal mood and affect  Nursing note and vitals reviewed        Lab Results:   Results for orders placed or performed during the hospital encounter of 10/19/17   CBC and differential   Result Value Ref Range    WBC 15 60 (H) 4 80 - 10 80 Thousand/uL    RBC 4 75 4 20 - 5 40 Million/uL    Hemoglobin 14 6 12 0 - 16 0 g/dL    Hematocrit 43 5 37 0 - 47 0 %    MCV 92 82 - 98 fL    MCH 30 7 27 0 - 31 0 pg    MCHC 33 5 31 4 - 37 4 g/dL    RDW 13 3 11 6 - 15 1 %    MPV 9 6 8 9 - 12 7 fL    Platelets 803 156 - 771 Thousands/uL    nRBC 0 /100 WBCs    Neutrophils Relative 92 (H) 43 - 75 %    Lymphocytes Relative 8 (L) 14 - 44 %    Monocytes Relative 0 (L) 4 - 12 %    Eosinophils Relative 0 0 - 6 %    Basophils Relative 0 0 - 1 %    Neutrophils Absolute 14 30 (H) 1 85 - 7 62 Thousands/µL    Lymphocytes Absolute 1 20 0 60 - 4 47 Thousands/µL    Monocytes Absolute 0 10 (L) 0 17 - 1 22 Thousand/µL    Eosinophils Absolute 0 00 0 00 - 0 61 Thousand/µL    Basophils Absolute 0 00 0 00 - 0 10 Thousands/µL   Basic metabolic panel Result Value Ref Range    Sodium 136 136 - 145 mmol/L    Potassium 3 8 3 5 - 5 3 mmol/L    Chloride 101 100 - 108 mmol/L    CO2 21 21 - 32 mmol/L    Anion Gap 14 (H) 4 - 13 mmol/L    BUN 20 5 - 25 mg/dL    Creatinine 0 82 0 60 - 1 30 mg/dL    Glucose 327 (H) 65 - 140 mg/dL    Calcium 9 8 8 3 - 10 1 mg/dL    eGFR 69 ml/min/1 73sq m   Troponin I   Result Value Ref Range    Troponin I 0 07 (H) <=0 04 ng/mL   NT-BNP PRO   Result Value Ref Range    NT-proBNP 342 <450 pg/mL   Troponin I   Result Value Ref Range    Troponin I 5 04 (H) <=0 04 ng/mL   TSH, 3rd generation   Result Value Ref Range    TSH 3RD GENERATON 0 760 0 358 - 3 740 uIU/mL   Fingerstick Glucose (POCT)   Result Value Ref Range    POC Glucose 216 (H) 65 - 140 mg/dl   Smear Review(Phlebs Do Not Order)   Result Value Ref Range    Platelet Estimate Adequate Adequate     Imaging:   CHEST      INDICATION:  Chest pain     COMPARISON:  09/05/2017     VIEWS:  Frontal and lateral projections     IMAGES:  2     FINDINGS:          Cardiomediastinal silhouette appears upper limits of normal in size      The lungs are clear  No pneumothorax or pleural effusion  No evidence of heart failure      Visualized osseous structures appear within normal limits for the patient's age      IMPRESSION:     No active pulmonary disease         Workstation performed: [id number]     EKG, Pathology, and Other Studies: I have personally reviewed pertinent reports  EKG: Sinus tachycardia with PACs  Low voltage QRS inferior infarct indeterminate age  Code Status: Full  Advance Directive and Living Will: Yes      Counseling / Coordination of Care  Total floor / unit time spent today 50 minutes  Greater than 50% of total time was spent with the patient and / or family counseling and / or coordination of care  [facility]

## 2017-10-19 NOTE — ED NOTES
Blood pressure prior to medication administration was 175/75  Dr Alexsandra Lopez at bedside          Sidney Venegas RN  10/19/17 7216

## 2017-10-20 ENCOUNTER — APPOINTMENT (INPATIENT)
Dept: NON INVASIVE DIAGNOSTICS | Facility: HOSPITAL | Age: 77
DRG: 247 | End: 2017-10-20
Attending: INTERNAL MEDICINE
Payer: MEDICARE

## 2017-10-20 ENCOUNTER — GENERIC CONVERSION - ENCOUNTER (OUTPATIENT)
Dept: OTHER | Facility: OTHER | Age: 77
End: 2017-10-20

## 2017-10-20 ENCOUNTER — HOSPITAL ENCOUNTER (INPATIENT)
Facility: HOSPITAL | Age: 77
LOS: 1 days | Discharge: HOME | DRG: 247 | End: 2017-10-21
Attending: INTERNAL MEDICINE | Admitting: FAMILY MEDICINE
Payer: MEDICARE

## 2017-10-20 VITALS
SYSTOLIC BLOOD PRESSURE: 146 MMHG | HEIGHT: 62 IN | BODY MASS INDEX: 35.88 KG/M2 | WEIGHT: 195 LBS | OXYGEN SATURATION: 96 % | HEART RATE: 76 BPM | TEMPERATURE: 97.8 F | RESPIRATION RATE: 18 BRPM | DIASTOLIC BLOOD PRESSURE: 88 MMHG

## 2017-10-20 DIAGNOSIS — R07.9 CHEST PAIN: ICD-10-CM

## 2017-10-20 PROBLEM — I21.4 NSTEMI (NON-ST ELEVATED MYOCARDIAL INFARCTION) (HCC): Status: ACTIVE | Noted: 2017-10-20

## 2017-10-20 LAB
ANION GAP SERPL CALCULATED.3IONS-SCNC: 9 MMOL/L (ref 4–13)
BUN SERPL-MCNC: 14 MG/DL (ref 5–25)
CALCIUM SERPL-MCNC: 9.4 MG/DL (ref 8.3–10.1)
CHLORIDE SERPL-SCNC: 105 MMOL/L (ref 100–108)
CO2 SERPL-SCNC: 26 MMOL/L (ref 21–32)
CREAT SERPL-MCNC: 0.68 MG/DL (ref 0.6–1.3)
ERYTHROCYTE [DISTWIDTH] IN BLOOD BY AUTOMATED COUNT: 13.4 % (ref 11.6–15.1)
GFR SERPL CREATININE-BSD FRML MDRD: 84 ML/MIN/1.73SQ M
GLUCOSE SERPL-MCNC: 133 MG/DL (ref 65–140)
GLUCOSE SERPL-MCNC: 135 MG/DL (ref 65–140)
GLUCOSE SERPL-MCNC: 145 MG/DL (ref 65–140)
HCT VFR BLD AUTO: 40 % (ref 37–47)
HGB BLD-MCNC: 13.3 G/DL (ref 12–16)
MAGNESIUM SERPL-MCNC: 1.7 MG/DL (ref 1.6–2.6)
MCH RBC QN AUTO: 30.6 PG (ref 27–31)
MCHC RBC AUTO-ENTMCNC: 33.3 G/DL (ref 31.4–37.4)
MCV RBC AUTO: 92 FL (ref 82–98)
PHOSPHATE SERPL-MCNC: 2.9 MG/DL (ref 2.3–4.1)
PLATELET # BLD AUTO: 212 THOUSANDS/UL (ref 130–400)
PMV BLD AUTO: 9 FL (ref 8.9–12.7)
POTASSIUM SERPL-SCNC: 3.4 MMOL/L (ref 3.5–5.3)
RBC # BLD AUTO: 4.35 MILLION/UL (ref 4.2–5.4)
SODIUM SERPL-SCNC: 140 MMOL/L (ref 136–145)
TROPONIN I SERPL-MCNC: 11.28 NG/ML
TROPONIN I SERPL-MCNC: 11.35 NG/ML
TROPONIN I SERPL-MCNC: 11.56 NG/ML
WBC # BLD AUTO: 15.3 THOUSAND/UL (ref 4.8–10.8)

## 2017-10-20 PROCEDURE — 4A023N7 MEASUREMENT OF CARDIAC SAMPLING AND PRESSURE, LEFT HEART, PERCUTANEOUS APPROACH: ICD-10-PCS | Performed by: INTERNAL MEDICINE

## 2017-10-20 PROCEDURE — 02703ZZ DILATION OF CORONARY ARTERY, ONE ARTERY, PERCUTANEOUS APPROACH: ICD-10-PCS | Performed by: INTERNAL MEDICINE

## 2017-10-20 PROCEDURE — 84484 ASSAY OF TROPONIN QUANT: CPT | Performed by: INTERNAL MEDICINE

## 2017-10-20 PROCEDURE — C1874 STENT, COATED/COV W/DEL SYS: HCPCS

## 2017-10-20 PROCEDURE — C1725 CATH, TRANSLUMIN NON-LASER: HCPCS | Performed by: INTERNAL MEDICINE

## 2017-10-20 PROCEDURE — 92920 PRQ TRLUML C ANGIOP 1ART&/BR: CPT

## 2017-10-20 PROCEDURE — B2151ZZ FLUOROSCOPY OF LEFT HEART USING LOW OSMOLAR CONTRAST: ICD-10-PCS | Performed by: INTERNAL MEDICINE

## 2017-10-20 PROCEDURE — 83735 ASSAY OF MAGNESIUM: CPT | Performed by: INTERNAL MEDICINE

## 2017-10-20 PROCEDURE — 82948 REAGENT STRIP/BLOOD GLUCOSE: CPT

## 2017-10-20 PROCEDURE — 85027 COMPLETE CBC AUTOMATED: CPT | Performed by: INTERNAL MEDICINE

## 2017-10-20 PROCEDURE — 93005 ELECTROCARDIOGRAM TRACING: CPT | Performed by: FAMILY MEDICINE

## 2017-10-20 PROCEDURE — 84100 ASSAY OF PHOSPHORUS: CPT | Performed by: INTERNAL MEDICINE

## 2017-10-20 PROCEDURE — 93458 L HRT ARTERY/VENTRICLE ANGIO: CPT | Performed by: INTERNAL MEDICINE

## 2017-10-20 PROCEDURE — C1769 GUIDE WIRE: HCPCS | Performed by: INTERNAL MEDICINE

## 2017-10-20 PROCEDURE — C1894 INTRO/SHEATH, NON-LASER: HCPCS | Performed by: INTERNAL MEDICINE

## 2017-10-20 PROCEDURE — 99153 MOD SED SAME PHYS/QHP EA: CPT | Performed by: INTERNAL MEDICINE

## 2017-10-20 PROCEDURE — C1887 CATHETER, GUIDING: HCPCS | Performed by: INTERNAL MEDICINE

## 2017-10-20 PROCEDURE — C1760 CLOSURE DEV, VASC: HCPCS | Performed by: INTERNAL MEDICINE

## 2017-10-20 PROCEDURE — C9600 PERC DRUG-EL COR STENT SING: HCPCS | Performed by: INTERNAL MEDICINE

## 2017-10-20 PROCEDURE — 027034Z DILATION OF CORONARY ARTERY, ONE ARTERY WITH DRUG-ELUTING INTRALUMINAL DEVICE, PERCUTANEOUS APPROACH: ICD-10-PCS | Performed by: INTERNAL MEDICINE

## 2017-10-20 PROCEDURE — B2111ZZ FLUOROSCOPY OF MULTIPLE CORONARY ARTERIES USING LOW OSMOLAR CONTRAST: ICD-10-PCS | Performed by: INTERNAL MEDICINE

## 2017-10-20 PROCEDURE — 80048 BASIC METABOLIC PNL TOTAL CA: CPT | Performed by: INTERNAL MEDICINE

## 2017-10-20 RX ORDER — OXYCODONE HYDROCHLORIDE AND ACETAMINOPHEN 5; 325 MG/1; MG/1
1 TABLET ORAL EVERY 6 HOURS PRN
Status: DISCONTINUED | OUTPATIENT
Start: 2017-10-20 | End: 2017-10-21 | Stop reason: HOSPADM

## 2017-10-20 RX ORDER — SODIUM CHLORIDE 9 MG/ML
50 INJECTION, SOLUTION INTRAVENOUS CONTINUOUS
Status: DISPENSED | OUTPATIENT
Start: 2017-10-20 | End: 2017-10-21

## 2017-10-20 RX ORDER — GLIMEPIRIDE 2 MG/1
1 TABLET ORAL DAILY
Status: DISCONTINUED | OUTPATIENT
Start: 2017-10-20 | End: 2017-10-21 | Stop reason: HOSPADM

## 2017-10-20 RX ORDER — FENTANYL CITRATE 50 UG/ML
INJECTION, SOLUTION INTRAMUSCULAR; INTRAVENOUS CODE/TRAUMA/SEDATION MEDICATION
Status: COMPLETED | OUTPATIENT
Start: 2017-10-20 | End: 2017-10-20

## 2017-10-20 RX ORDER — LIDOCAINE HYDROCHLORIDE 10 MG/ML
INJECTION, SOLUTION INFILTRATION; PERINEURAL CODE/TRAUMA/SEDATION MEDICATION
Status: COMPLETED | OUTPATIENT
Start: 2017-10-20 | End: 2017-10-20

## 2017-10-20 RX ORDER — SODIUM CHLORIDE 9 MG/ML
75 INJECTION, SOLUTION INTRAVENOUS CONTINUOUS
Status: DISCONTINUED | OUTPATIENT
Start: 2017-10-20 | End: 2017-10-20

## 2017-10-20 RX ORDER — DIPHENHYDRAMINE HYDROCHLORIDE 50 MG/ML
INJECTION INTRAMUSCULAR; INTRAVENOUS CODE/TRAUMA/SEDATION MEDICATION
Status: COMPLETED | OUTPATIENT
Start: 2017-10-20 | End: 2017-10-20

## 2017-10-20 RX ORDER — NITROGLYCERIN 20 MG/100ML
INJECTION INTRAVENOUS CODE/TRAUMA/SEDATION MEDICATION
Status: COMPLETED | OUTPATIENT
Start: 2017-10-20 | End: 2017-10-20

## 2017-10-20 RX ORDER — ONDANSETRON 2 MG/ML
4 INJECTION INTRAMUSCULAR; INTRAVENOUS EVERY 6 HOURS PRN
Status: DISCONTINUED | OUTPATIENT
Start: 2017-10-20 | End: 2017-10-21 | Stop reason: HOSPADM

## 2017-10-20 RX ORDER — ASPIRIN 81 MG/1
81 TABLET, CHEWABLE ORAL DAILY
Status: DISCONTINUED | OUTPATIENT
Start: 2017-10-21 | End: 2017-10-21 | Stop reason: HOSPADM

## 2017-10-20 RX ORDER — GLIMEPIRIDE 2 MG/1
1 TABLET ORAL 2 TIMES DAILY
Status: CANCELLED | OUTPATIENT
Start: 2017-10-20

## 2017-10-20 RX ORDER — PRAVASTATIN SODIUM 40 MG
40 TABLET ORAL
Status: DISCONTINUED | OUTPATIENT
Start: 2017-10-20 | End: 2017-10-21 | Stop reason: HOSPADM

## 2017-10-20 RX ORDER — METOPROLOL SUCCINATE 50 MG/1
50 TABLET, EXTENDED RELEASE ORAL EVERY MORNING
Status: DISCONTINUED | OUTPATIENT
Start: 2017-10-20 | End: 2017-10-21 | Stop reason: HOSPADM

## 2017-10-20 RX ORDER — POTASSIUM CHLORIDE 20MEQ/15ML
40 LIQUID (ML) ORAL ONCE
Status: COMPLETED | OUTPATIENT
Start: 2017-10-20 | End: 2017-10-20

## 2017-10-20 RX ORDER — MIDAZOLAM HYDROCHLORIDE 1 MG/ML
INJECTION INTRAMUSCULAR; INTRAVENOUS CODE/TRAUMA/SEDATION MEDICATION
Status: COMPLETED | OUTPATIENT
Start: 2017-10-20 | End: 2017-10-20

## 2017-10-20 RX ORDER — MAGNESIUM HYDROXIDE/ALUMINUM HYDROXICE/SIMETHICONE 120; 1200; 1200 MG/30ML; MG/30ML; MG/30ML
30 SUSPENSION ORAL EVERY 6 HOURS PRN
Status: DISCONTINUED | OUTPATIENT
Start: 2017-10-20 | End: 2017-10-21 | Stop reason: HOSPADM

## 2017-10-20 RX ADMIN — Medication 1000 MG: at 08:01

## 2017-10-20 RX ADMIN — OXYCODONE HYDROCHLORIDE AND ACETAMINOPHEN 1 TABLET: 5; 325 TABLET ORAL at 15:32

## 2017-10-20 RX ADMIN — DIPHENHYDRAMINE HYDROCHLORIDE 25 MG: 50 INJECTION, SOLUTION INTRAMUSCULAR; INTRAVENOUS at 11:07

## 2017-10-20 RX ADMIN — HYDROCORTISONE SODIUM SUCCINATE 100 MG: 100 INJECTION, POWDER, FOR SOLUTION INTRAMUSCULAR; INTRAVENOUS at 10:56

## 2017-10-20 RX ADMIN — TICAGRELOR 180 MG: 90 TABLET ORAL at 11:20

## 2017-10-20 RX ADMIN — FENTANYL CITRATE 25 MCG: 50 INJECTION INTRAMUSCULAR; INTRAVENOUS at 11:38

## 2017-10-20 RX ADMIN — PRAVASTATIN SODIUM 40 MG: 40 TABLET ORAL at 15:39

## 2017-10-20 RX ADMIN — SODIUM CHLORIDE 50 ML/HR: 0.9 INJECTION, SOLUTION INTRAVENOUS at 12:28

## 2017-10-20 RX ADMIN — METOPROLOL TARTRATE 25 MG: 25 TABLET ORAL at 08:01

## 2017-10-20 RX ADMIN — POTASSIUM CHLORIDE 40 MEQ: 20 SOLUTION ORAL at 08:23

## 2017-10-20 RX ADMIN — MIDAZOLAM HYDROCHLORIDE 0.5 MG: 1 INJECTION, SOLUTION INTRAMUSCULAR; INTRAVENOUS at 11:44

## 2017-10-20 RX ADMIN — FENTANYL CITRATE 25 MCG: 50 INJECTION INTRAMUSCULAR; INTRAVENOUS at 11:11

## 2017-10-20 RX ADMIN — BIVALIRUDIN 1.75 MG/KG/HR: 250 INJECTION, POWDER, LYOPHILIZED, FOR SOLUTION INTRAVENOUS at 11:25

## 2017-10-20 RX ADMIN — PREDNISONE 50 MG: 20 TABLET ORAL at 10:15

## 2017-10-20 RX ADMIN — SODIUM CHLORIDE 75 ML/HR: 0.9 INJECTION, SOLUTION INTRAVENOUS at 10:28

## 2017-10-20 RX ADMIN — ASPIRIN 81 MG 81 MG: 81 TABLET ORAL at 08:01

## 2017-10-20 RX ADMIN — LIDOCAINE HYDROCHLORIDE 10 ML: 10 INJECTION, SOLUTION INFILTRATION; PERINEURAL at 11:12

## 2017-10-20 RX ADMIN — FENTANYL CITRATE 25 MCG: 50 INJECTION INTRAMUSCULAR; INTRAVENOUS at 11:44

## 2017-10-20 RX ADMIN — MIDAZOLAM HYDROCHLORIDE 1 MG: 1 INJECTION, SOLUTION INTRAMUSCULAR; INTRAVENOUS at 11:11

## 2017-10-20 RX ADMIN — DOCUSATE SODIUM 100 MG: 100 CAPSULE, LIQUID FILLED ORAL at 08:01

## 2017-10-20 RX ADMIN — TIMOLOL MALEATE 1 DROP: 2.5 SOLUTION OPHTHALMIC at 21:31

## 2017-10-20 RX ADMIN — OXYCODONE HYDROCHLORIDE AND ACETAMINOPHEN 1000 MG: 500 TABLET ORAL at 08:01

## 2017-10-20 RX ADMIN — Medication 1 TABLET: at 08:01

## 2017-10-20 RX ADMIN — ENOXAPARIN SODIUM 90 MG: 100 INJECTION SUBCUTANEOUS at 00:43

## 2017-10-20 RX ADMIN — METOPROLOL SUCCINATE 50 MG: 50 TABLET, EXTENDED RELEASE ORAL at 12:31

## 2017-10-20 RX ADMIN — NITROGLYCERIN 200 MCG: 20 INJECTION INTRAVENOUS at 11:30

## 2017-10-20 RX ADMIN — BIMATOPROST 1 DROP: 0.1 SOLUTION/ DROPS OPHTHALMIC at 21:33

## 2017-10-20 RX ADMIN — MIDAZOLAM HYDROCHLORIDE 0.5 MG: 1 INJECTION, SOLUTION INTRAMUSCULAR; INTRAVENOUS at 11:38

## 2017-10-20 RX ADMIN — TICAGRELOR 90 MG: 90 TABLET ORAL at 21:31

## 2017-10-20 NOTE — H&P
History and Physical - [facility] Medicine    Patient Information: Lasha Jimenez 65 y o  female MRN: [id number]  Unit/Bed#: -01 Encounter: [id number]  Admitting Physician: Chriss Tucker MD  PCP: Cornelio Wild DO  Date of Admission:  10/20/17    Assessment/Plan:    Hospital Problem List:     Principal Problem:    NSTEMI (non-ST elevated myocardial infarction) [facility])  Active Problems:    Non-insulin dependent type 2 diabetes mellitus ([address] )    Hyperlipidemia    Hypertension    Cataract    Fibromyalgia    IBS (irritable bowel syndrome)    #Chest pain likely secondary to ACS:   -Trop: 0 7, 5 04  -EKG: Sinus tachycardia with PACs  Low voltage QRS inferior infarct indeterminate age    -Continue monitoring on telemetry, serial troponin, and EKGs  -Continue ASA, Statin, Nitro, Metoprolol, Lovenox 1mg/kg q12hr, Morphine PRN chest pain  -Plan for cardiac catheterization tomorrow at [facility], will transfer out earlier if patient has EKG changes or worsening troponin or chest pain  -Dr Magui Astorga following     #Uncontrolled HTN:  -Received lopressor 5mg IV x 1 in ED with response  -Continue monitoring    #Uncontrolled T2DM:   -HgbA1c: 7 0 on 9/18/17  -Continue accucheks and coverage with ISS q6hr while NPO     #Fibromyalgia/DJD/Chronic pain:   -Resume home percocet     #IBS:   -No concerns for a flare at this time     #Urinary incontinence:   -stable     #Hx of cataracts  -recent cataract surgery  -patient will bring in eye drops from home     #Sikhism  -does not accept blood products       Plan for the Primary Problem(s):    Plan for Additional Problems:   ·     VTE Prophylaxis: Enoxaparin (Lovenox)  / sequential compression device   Code Status: full code  POLST: There is no POLST form on file for this patient (pre-hospital)    Anticipated Length of Stay:  Patient will be admitted on an Inpatient basis with an anticipated length of stay of  > 2 midnights     Justification for Hospital Stay:  Acute myocardial infection    Total Time for Visit, including Counseling / Coordination of Care: 1 hour  Greater than 50% of this total time spent on direct patient counseling and coordination of care  Chief Complaint:   Chest pain  History of Present Illness:      Lotus Yung is a 65 y o  morbidly obese non-compliant F with history of Uncontrolled T2DM with neuropathy, HTN, PVD, dyslipidemia, DJD, fibromyalgia, IBS, urinary incontinence who presents with constant left-sided substernal pressure radiating to the left arm and aggravated with exertion and associated with nausea and sob  Patient states she has been experiencing similar pain for the past 2 months and was undergoing a cardiac work up  Patient was scheduled for a cardiac cath this morning which she refused as she was told that she would be transferred out should she need any intervention  She has iodin dye allergies     Patient received lopressor 5 mg IV x 1, Nitropaste,  mg po x 1, 1mg/kg Lovenox in the ED and was seen by Dr Cecily Alvarado  Patient continued with burning and chest pressure on my arrival to the ED  Initial troponin was 0 7  >11 35     Review of Systems:    Review of Systems   Constitutional: Positive for fatigue  Negative for activity change, appetite change, chills, fever and unexpected weight change  HENT: Negative  Negative for congestion and ear pain  Eyes: Negative for photophobia, pain, discharge, redness and itching  Respiratory: Positive for chest tightness  Negative for apnea, cough, choking, shortness of breath, wheezing and stridor  Cardiovascular: Positive for chest pain  Negative for palpitations and leg swelling  Gastrointestinal: Negative for abdominal distention, abdominal pain, anal bleeding, blood in stool, constipation, diarrhea, nausea and rectal pain  Endocrine: Negative for cold intolerance, heat intolerance, polydipsia, polyphagia and polyuria     Genitourinary: Negative for difficulty urinating, dyspareunia, dysuria, enuresis and flank pain  Musculoskeletal: Negative  Negative for arthralgias, back pain, gait problem, joint swelling, myalgias and neck pain  Skin: Negative for color change, pallor, rash and wound  Neurological: Negative for dizziness, tremors, seizures, syncope, facial asymmetry, speech difficulty, weakness, light-headedness, numbness and headaches  Psychiatric/Behavioral: Negative  Negative for agitation  Past Medical and Surgical History:     Past Medical History:   Diagnosis Date    Arthritis     DJD both knees-has fallen on the knees several times    Diabetes mellitus ([facility] [address] )     Fall with injury 02/15/2017    fell at home secondary to macular degeneration vision issues, neck pain currently    Fibromyalgia     had for many years  may have polymyalgia    Fibromyalgia, primary     Frequent falls 05/2017    injured left shoulder-recent MRI    Glaucoma     both eyes    Herniated cervical disc     Hyperlipidemia     Hypertension     Irritable bowel syndrome     Liver disease     " fatty liver"    Lumbar herniated disc     with sciatica    Macular degeneration     both eyes, gets injections    Neuropathy     in the feet? falls on occ    Obesity     Pancreas disorder     "slow leak"    Shortness of breath     Urinary incontinence        Past Surgical History:   Procedure Laterality Date    APPENDECTOMY      CATARACT EXTRACTION      CHOLECYSTECTOMY      Lap    COLONOSCOPY      COLONOSCOPY N/A 2/21/2017    Procedure: COLONOSCOPY and biopsy, snare polypectomy;  Surgeon: Jalen Johnson MD;  Location: [facility] GI LAB; Service:     DILATION AND CURETTAGE, DIAGNOSTIC / THERAPEUTIC      ESOPHAGOGASTRODUODENOSCOPY N/A 2/21/2017    Procedure: ESOPHAGOGASTRODUODENOSCOPY (EGD) and biopsy ;  Surgeon: Jalen Johnson MD;  Location: [facility] GI LAB;   Service:     HEMORRHOID SURGERY      in her 18's    HYSTERECTOMY      complete    OOPHORECTOMY Left     age 21 then hysterectomy    ID REMV CATARACT EXTRACAP,INSERT LENS Right 5/19/2016    Procedure: EXTRACTION EXTRACAPSULAR CATARACT PHACO INTRAOCULAR LENS (IOL); Surgeon: Renard Zuñiga MD;  Location: [facility] MAIN OR;  Service: Ophthalmology     [address] Left 10/12/2017    Procedure: EXTRACTION EXTRACAPSULAR CATARACT PHACO INTRAOCULAR LENS (IOL); Surgeon: Renard Zuñiga MD;  Location: [facility] MAIN OR;  Service: Ophthalmology    TONSILLECTOMY      age 24       Meds/Allergies:    Prior to Admission medications    Medication Sig Start Date End Date Taking? Authorizing Provider   ascorbic acid (VITAMIN C) 500 mg tablet Take 1,000 mg by mouth 2 (two) times a day      Historical Provider, MD   bimatoprost (LUMIGAN) 0 01 % ophthalmic drops Administer 1 drop to both eyes daily at bedtime      Historical Provider, MD   metFORMIN (GLUCOPHAGE) 850 mg tablet Take 850 mg by mouth 2 (two) times a day with meals  Historical Provider, MD   metoprolol succinate (TOPROL-XL) 50 mg 24 hr tablet Take 50 mg by mouth every morning  Historical Provider, MD   Multiple Vitamins-Minerals (PRESERVISION AREDS 2 PO) Take by mouth 2 (two) times a day  Historical Provider, MD   Omega-3 Fatty Acids (FISH OIL PO) Take 1,000 mg by mouth 2 (two) times a day  Historical Provider, MD   oxyCODONE-acetaminophen (PERCOCET)  mg per tablet Take 1 tablet by mouth every 6 (six) hours as needed for severe pain May take 1/2 tab when at home  If going out will take 10/325 mg     Viet Brito DO   rosuvastatin (CRESTOR) 20 MG tablet Take 20 mg by mouth daily at bedtime  Historical Provider, MD   timolol (BETIMOL) 0 25 % ophthalmic solution Administer 1 drop to the right eye 2 (two) times a day Patient not sure of the dose    Historical Provider, MD     I have reviewed home medications with patient personally  Allergies:    Allergies   Allergen Reactions    Combigan [Brimonidine Tartrate-Timolol] Other (See Comments) Eyes became red    Iohexol Hives    Other Hives     Cat Scan Dye    Lyrica [Pregabalin]      Became "sick" so she avoids       Social History:     Marital Status:    Occupation:   Patient Pre-hospital Living Situation:   Patient Pre-hospital Level of Mobility:   Patient Pre-hospital Diet Restrictions:   Substance Use History:   History   Alcohol use Not on file     Comment: occ     History   Smoking Status    Never Smoker   Smokeless Tobacco    Never Used     History   Drug use: Unknown       Family History:    non-contributory    Physical Exam:     Vitals:   Blood Pressure: 135/63 (10/20/17 0954)  Pulse: 68 (10/20/17 0954)  Temperature: 97 9 °F (36 6 °C) (10/20/17 0954)  Temp Source: Oral (10/20/17 0954)  Respirations: 18 (10/20/17 0954)  Height: 5' 2" (157 5 cm) (10/20/17 0954)  Weight - Scale: 86 7 kg (191 lb 2 2 oz) (10/20/17 0954)  SpO2: 93 % (10/20/17 0954)    Physical Exam   Constitutional: She is oriented to person, place, and time  No distress  HENT:   Head: Normocephalic and atraumatic  Right Ear: External ear normal    Left Ear: External ear normal    Eyes: Conjunctivae and EOM are normal  Pupils are equal, round, and reactive to light  Right eye exhibits no discharge  No scleral icterus  Neck: Normal range of motion  Neck supple  No tracheal deviation present  No thyromegaly present  Cardiovascular: Exam reveals no gallop and no friction rub  No murmur heard  Pulmonary/Chest: No respiratory distress  She has no wheezes  She has no rales  She exhibits no tenderness  Abdominal: Soft  She exhibits no distension and no mass  There is tenderness  There is guarding  There is no rebound  Musculoskeletal: Normal range of motion  She exhibits no edema, tenderness or deformity  Lymphadenopathy:     She has no cervical adenopathy  Neurological: She is alert and oriented to person, place, and time  She has normal reflexes  She displays normal reflexes  No cranial nerve deficit  She exhibits normal muscle tone  Coordination normal    Skin: Skin is warm  She is not diaphoretic  Psychiatric: She has a normal mood and affect  am)    Additional Data:     Lab Results: I have personally reviewed pertinent reports  Results from last 7 days  Lab Units 10/20/17  0416 10/19/17  1228   WBC Thousand/uL 15 30* 15 60*   HEMOGLOBIN g/dL 13 3 14 6   HEMATOCRIT % 40 0 43 5   PLATELETS Thousands/uL 212 217   NEUTROS PCT %  --  92*   LYMPHS PCT %  --  8*   MONOS PCT %  --  0*   EOS PCT %  --  0       Results from last 7 days  Lab Units 10/20/17  0416   SODIUM mmol/L 140   POTASSIUM mmol/L 3 4*   CHLORIDE mmol/L 105   CO2 mmol/L 26   BUN mg/dL 14   CREATININE mg/dL 0 68   CALCIUM mg/dL 9 4   GLUCOSE RANDOM mg/dL 145*           Imaging: I have personally reviewed pertinent reports  X-ray Chest 2 Views    Result Date: 10/19/2017  Narrative: CHEST INDICATION:  Chest pain COMPARISON:  09/05/2017 VIEWS:  Frontal and lateral projections IMAGES:  2 FINDINGS:     Cardiomediastinal silhouette appears upper limits of normal in size  The lungs are clear  No pneumothorax or pleural effusion  No evidence of heart failure  Visualized osseous structures appear within normal limits for the patient's age  Impression: No active pulmonary disease  Workstation performed: [id number]     Dxa Bone Density Spine Hip And Pelvis    Result Date: 9/26/2017  Narrative: CENTRAL  DXA SCAN CLINICAL HISTORY: 79-year-old woman  Menopause in her late 46s  OTHER RISK FACTORS: History of fracture following a low level trauma  TECHNIQUE: Bone densitometry was performed using a HoloDizzion Horizon C bone densitometer  Regions of interest appear properly placed  COMPARISON: There are no prior DXA studies performed on this unit for comparison   RESULTS: LUMBAR SPINE L1-L4 (L2): BMD  1 488  gm/cm2 / T-score 2 4 / Z score 4 2 (Lumbar levels within parentheses represent vertebrae excluded from analysis due to local structural abnormalities or artifact)  These values are artifactually elevated due to degenerative sclerosis and osteophytosis  LEFT TOTAL HIP: BMD 1 267  gm/cm2 / T-score 2 1 / Z score 4 0 LEFT FEMORAL NECK: BMD 1 298  gm/cm2 / T score 1 9 / Z score 3 9 RIGHT TOTAL HIP: BMD  1 002  gm/cm2 / T-score 0 0 / Z score 1 9 RIGHT FEMORAL NECK: BMD  0 971  gm/cm2 / T score -0 5 / Z score 1 6     Impression: 1  Normal bone density  2   The 10 year risk of hip fracture is 1 6% with the 10 year risk of major osteoporotic fracture being 13 1% as calculated by the [facility]/WHO fracture risk assessment tool (FRAX)  3   The current NOF guidelines recommend treating patients with a T-score of -2 5 or less in the lumbar spine or hips, or in post-menopausal women and men over the age of 48 with low bone mass (osteopenia) and a FRAX 10 year risk score of >3% for hip fracture and/or >20% for major osteoporotic fracture  4   A daily intake of at least 1200 mg calcium and vitamin D 800- 1000 IU, as well as weight bearing and muscle strengthening exercise, fall prevention and avoidance of tobacco and excessive alcohol as preventive measurements are suggested  5   Follow-up DXA in two years is recommended for most patients  A one year follow-up DXA is recommended after initiation or change in therapy for osteoporosis  More frequent evaluation is also appropriate for patients with conditions associated with rapid bone loss, such as glucocorticoid therapy  The FRAX tool has not been validated in patients currently or previously treated with pharmacotherapy for osteoporosis  In such patients, clinical judgment must be exercised in interpreting FRAX scores  It is not appropriate to use FRAX to monitor treatment response   WHO CLASSIFICATION: Normal (a T-score of -1 0 or higher) Low bone mineral density (a T-score of less than -1 0 but higher than -2 5) Osteoporosis (a T-score of -2 5 or less) Severe osteoporosis (a T-score of -2 5 or less with a fragility fracture) Workstation performed: [id number]     Mammo Screening Bilateral W Cad    Result Date: 9/26/2017  Narrative: Patient History: Patient is postmenopausal  Patient's BMI is 35 1  Reason for exam: screening, asymptomatic  Screening Mammo Screening Bilateral W CAD: September 26, 2017 - Check In #: [de-identified] Bilateral CC and MLO view(s) were taken  Technologist: CEDRIC Dash Prior study comparison: March 10, 2016, mammo screening bilateral W CAD performed at [facility]  November 4, 2014, bilateral screening mammogram performed at [facility]  December 8, 2011, left breast diagnostic mammogram performed at [facility]  November 22, 2011, left breast screening mammogram performed at [facility]  April 14, 2009, screening mammogram performed at [facility]  The breast tissue is almost entirely fat  Bilateral digital mammography is performed  No dominant soft tissue mass, architectural distortion or suspicious calcifications are noted  The skin and nipple contours are within normal limits  Scattered benign appearing calcifications are noted  No evidence of malignancy  No significant changes when compared to prior studies  IMPRESSION:1  No evidence of malignancy  2   No significant change when compared with the prior study  ACR BI-RADS® Assessments: BiRad:2 - Benign Recommendation: Routine screening mammogram of both breasts in 1 year  Analyzed by CAD The patient is scheduled in a reminder system for screening mammography  8-10% of cancers will be missed on mammography  Management of a palpable abnormality must be based on clinical grounds  Patients will be notified of their results via letter from our facility  Accredited by [employer] of [employer] and FDA   Transcription Location:  [address]: [id number] Risk Value(s): Ayo 10 Year: 2  300%, Tyrer-Cuzick Lifetime: 2 300%, Myriad Table: 1 5%, APOLONIA 5 Year: 1 4%, NCI Lifetime: 2 5%      EKG, Pathology, and Other Studies Reviewed on Admission:   · EKG:     Allscripts / Epic Records Reviewed: Yes     ** Please Note: This note has been constructed using a voice recognition system   **

## 2017-10-20 NOTE — EMTALA/ACUTE CARE TRANSFER
[address] [facility] [address] [id number]  Dept: [phone]      ACUTE CARE TRANSFER CONSENT    NAME Tami Ballesteros                                         1940                              MRN [id number]    I have been informed of my rights regarding examination, treatment, and transfer   by Dr Geovany Daly MD    Benefits:  Cardiac catheterization with intervention  Risks:  Worsening of condition/Death      Consent for Transfer:  I acknowledge that my medical condition has been evaluated and explained to me by the treating physician or other qualified medical person and/or my attending physician, who has recommended that I be transferred to the service of    at    The above potential benefits of such transfer, the potential risks associated with such transfer, and the probable risks of not being transferred have been explained to me, and I fully understand them  The doctor has explained that, in my case, the benefits of transfer outweigh the risks  I agree to be transferred  I authorize the performance of emergency medical procedures and treatments upon me in both transit and upon arrival at the receiving facility  Additionally, I authorize the release of any and all medical records to the receiving facility and request they be transported with me, if possible  I understand that the safest mode of transportation during a medical emergency is an ambulance and that the Hospital advocates the use of this mode of transport  Risks of traveling to the receiving facility by car, including absence of medical control, life sustaining equipment, such as oxygen, and medical personnel has been explained to me and I fully understand them  (LUIS CORRECT BOX BELOW)  [  ]  I consent to the stated transfer and to be transported by ambulance/helicopter    [  ]  I consent to the stated transfer, but refuse transportation by ambulance and accept full responsibility for my transportation by car  I understand the risks of non-ambulance transfers and I exonerate the Hospital and its staff from any deterioration in my condition that results from this refusal     X___________________________________________    DATE  10/20/17  TIME________  Signature of patient or legally responsible individual signing on patient behalf           RELATIONSHIP TO PATIENT_________________________          Provider Certification    NAME Preethi Keita                                         1940                              MRN [id number]    A medical screening exam was performed on the above named patient  Based on the examination:    Condition Necessitating Transfer ACS/procedure not done at current facility    Patient Condition:  Stable     Reason for Transfer:  Requires cardiac catheterization with intervention    Transfer Requirements: Facility     · Space available and qualified personnel available for treatment as acknowledged by    · Agreed to accept transfer and to provide appropriate medical treatment as acknowledged by          · Appropriate medical records of the examination and treatment of the patient are provided at the time of transfer   [address], [address] _______  · Transfer will be performed by qualified personnel from    and appropriate transfer equipment as required, including the use of necessary and appropriate life support measures      Provider Certification: I have examined the patient and explained the following risks and benefits of being transferred/refusing transfer to the patient/family:         Based on these reasonable risks and benefits to the patient and/or the unborn child(jessica), and based upon the information available at the time of the patients examination, I certify that the medical benefits reasonably to be expected from the provision of appropriate medical treatments at another medical facility outweigh the increasing risks, if any, to the individuals medical condition, and in the case of labor to the unborn child, from effecting the transfer      X____________________________________________ DATE 10/20/17        TIME_______      ORIGINAL - SEND TO MEDICAL RECORDS   COPY - SEND WITH PATIENT DURING TRANSFER

## 2017-10-20 NOTE — SOCIAL WORK
Script was obtained for Eliquis to have filled and determine co pay cost   The script was sent to [address] [place] [address]  Pharmacy was bale to run script and have stated co pay amount is $[phone]  Pt is not able to afford this  CM will follow up with Cardiology

## 2017-10-20 NOTE — PLAN OF CARE
Problem: DISCHARGE PLANNING - CARE MANAGEMENT  Goal: Discharge to post-acute care or home with appropriate resources  INTERVENTIONS:  - Conduct assessment to determine patient/family and health care team treatment goals, and need for post-acute services based on payer coverage, community resources, and patient preferences, and barriers to discharge  - Address psychosocial, clinical, and financial barriers to discharge as identified in assessment in conjunction with the patient/family and health care team  - Arrange appropriate level of post-acute services according to patient's   needs and preference and payer coverage in collaboration with the physician and health care team  - Communicate with and update the patient/family, physician, and health care team regarding progress on the discharge plan  - Arrange appropriate transportation to post-acute venues  Outcome: Meli Mayorga was obtained for Eliquis to have filled and determine co pay cost   The script was sent to [address] [place] [address]  Pharmacy was bale to run script and have stated co pay amount is $[phone]  Pt is not able to afford this  CM will follow up with Cardiology

## 2017-10-20 NOTE — CASE MANAGEMENT
Initial Clinical Review    Admission: Date/Time/Statement: 10/20/17 @ 0948     Orders Placed This Encounter   Procedures    Inpatient Admission     Standing Status:   Standing     Number of Occurrences:   1     Order Specific Question:   Admitting Physician     Answer:   Vaughn Gibbs [[id number]]     Order Specific Question:   Level of Care     Answer:   Med Surg [16]     Order Specific Question:   Estimated length of stay     Answer:   More than 2 Midnights     Order Specific Question:   Certification     Answer:   I certify that inpatient services are medically necessary for this patient for a duration of greater than two midnights  See H&P and MD Progress Notes for additional information about the patient's course of treatment  ED: Date/Time/Mode of Arrival:  Transfer from [facility]  ED Arrival Information     Patient not seen in ED                       Chief Complaint: chest pain  History of Illness: 65 y  o  morbidly obese non-compliant F with history of Uncontrolled T2DM with neuropathy, HTN, PVD, dyslipidemia, DJD, fibromyalgia, IBS, urinary incontinence who presents with constant left-sided substernal pressure radiating to the left arm and aggravated with exertion and associated with nausea and sob  Patient states she has been experiencing similar pain for the past 2 months and was undergoing a cardiac work up  Patient was scheduled for a cardiac cath this morning which she refused as she was told that she would be transferred out should she need any intervention  She has iodin dye allergies     Patient received lopressor 5 mg IV x 1, Nitropaste,  mg po x 1, 1mg/kg Lovenox in the ED and was seen by Dr Missy Drake  Patient continued with burning and chest pressure on my arrival to the ED   Initial troponin was 0 7  >11 35    ED Vital Signs:   ED Triage Vitals   Temperature Pulse Respirations Blood Pressure SpO2   10/20/17 0954 10/20/17 0954 10/20/17 0954 10/20/17 0954 10/20/17 0954   97 9 °F (36 6 °C) 68 18 135/63 93 %      Temp Source Heart Rate Source Patient Position - Orthostatic VS BP Location FiO2 (%)   10/20/17 0954 10/20/17 1230 10/20/17 0954 10/20/17 0954 --   Oral Monitor Lying Left arm       Pain Score       10/20/17 0954       No Pain        Wt Readings from Last 1 Encounters:   10/20/17 86 7 kg (191 lb 2 2 oz)       Vital Signs (abnormal): none    Abnormal Labs/Diagnostic Test Results:   Serial troponin 11 28; 11 35  k 3 4  Glucose 145  Wbc 15 30    ED Treatment:   Medication Administration - No Administrations Displayed (No Start Event Found)     None          Past Medical/Surgical History:    Active Ambulatory Problems     Diagnosis Date Noted    Cataract 05/16/2016    Back pain 06/26/2017    Left knee pain 06/26/2017    Fibromyalgia 06/26/2017    Non-insulin dependent type 2 diabetes mellitus ([facility] [address] ) 06/26/2017    IBS (irritable bowel syndrome) 06/26/2017    Urinary incontinence 06/26/2017    Hyperlipidemia 06/26/2017    Hypertension 06/26/2017    Decreased mobility 06/27/2017    NSTEMI (non-ST elevated myocardial infarction) ([facility] [address] ) 10/19/2017     Resolved Ambulatory Problems     Diagnosis Date Noted    Chest pain 06/25/2017    Combined forms of age-related cataract of left eye 10/11/2017     Past Medical History:   Diagnosis Date    Arthritis     Diabetes mellitus ([facility] [address] )     Fall with injury 02/15/2017    Fibromyalgia     Fibromyalgia, primary     Frequent falls 05/2017    Glaucoma     Herniated cervical disc     Hyperlipidemia     Hypertension     Irritable bowel syndrome     Liver disease     Lumbar herniated disc     Macular degeneration     Neuropathy     Obesity     Pancreas disorder     Shortness of breath     Urinary incontinence        Admitting Diagnosis: NSTEMI (non-ST elevated myocardial infarction) ([facility] [address] ) [I21 4]    Age/Sex: 65 y o  female  Assessment/Plan: Chest pain likely secondary to ACS:   -Trop: 0 7, 5 04  -EKG: Sinus tachycardia with PACs  Low voltage QRS inferior infarct indeterminate age    -Continue monitoring on telemetry, serial troponin, and EKGs  -Continue ASA, Statin, Nitro, Metoprolol, Lovenox 1mg/kg q12hr, Morphine PRN chest pain  -Plan for cardiac catheterization , will transfer out earlier if patient has EKG changes or worsening troponin or chest pain  -Dr Kristen Zimmer following     #Uncontrolled HTN:  -Received lopressor 5mg IV x 1 in ED with response  -Continue monitoring     #Uncontrolled T2DM:   -HgbA1c: 7 0 on 9/18/17  -Continue accucheks and coverage with ISS q6hr while NPO     #Fibromyalgia/DJD/Chronic pain:   -Resume home percocet     #IBS:   -No concerns for a flare at this time     #Urinary incontinence:   -stable     #Hx of cataracts  -recent cataract surgery  -patient will bring in eye drops from home     #Zoroastrian  -does not accept blood products    Admission Orders:  TELE  10/20/2017  1034 INPATIENT   Scheduled Meds:   [START ON 10/21/2017] aspirin 81 mg Oral Daily   bimatoprost 1 drop Both Eyes HS   metoprolol succinate 50 mg Oral QAM   pravastatin 40 mg Oral Daily With Dinner   ticagrelor 90 mg Oral Q12H [address]   timolol 1 drop Both Eyes Daily     Continuous Infusions:   sodium chloride 50 mL/hr Last Rate: 50 mL/hr (10/20/17 1228)   angiomax infusion iv  PRN Meds: not used:   aluminum-magnesium hydroxide-simethicone    ondansetron    oxyCODONE-acetaminophen    OTHER ORDERS:  Post cath care - puncture site care     Continuous st segment monitoring     scds  Consult cardiology  Cardiac catheterization

## 2017-10-20 NOTE — DISCHARGE INSTRUCTIONS
1  Please see the post cardiac catheterization/ angioseal closure device instructions  No heavy lifting, greater than 10 lbs  or strenuous  activity for 48 hrs  See the post cardiac catheterization/ angioseal closure device instructions  2 Remove band aid tomorrow  Shower and wash area- groin gently with soap and water- beginning tomorrow  Rinse and pat dry  Apply new water seal band aid  Repeat this process for 5 days  No powders, creams lotions or antibiotic ointments  for 5 days  No tub baths, hot tubs or swimming for 5 days  3 No driving for 2 days    4  Do not stop aspirin or Brillinta (ticagrelor) for any reason without a cardiologists consent, or the stent could block up and cause a heart attack

## 2017-10-20 NOTE — PROGRESS NOTES
Transfer Note    Preethi Keita 65 y o  female MRN: [id number]  [address]   Unit/Bed#: 4 [place] 044-10 Encounter: [id number]    Code Status: Level 1 - Full Code  POA:    POLST:      Reason for adm: Chest pain 2/2 ACS    Active problems:   Principal Problem:    NSTEMI (non-ST elevated myocardial infarction) ([address] )  Active Problems:    Cataract    Back pain    Left knee pain    Fibromyalgia    Non-insulin dependent type 2 diabetes mellitus (HCC)    IBS (irritable bowel syndrome)    Urinary incontinence    Hyperlipidemia    Hypertension    Decreased mobility  Resolved Problems:    * No resolved hospital problems  *      Consultants: Dr Fernanda Rees    History of Present Illness:    65 y o  morbidly obese non-compliant F with history of Uncontrolled T2DM with neuropathy, HTN, PVD, dyslipidemia, DJD, fibromyalgia, IBS, urinary incontinence who presents with constant left-sided substernal pressure radiating to the left arm and aggravated with exertion and associated with nausea and sob since 0930 today  Patient states she has been experiencing similar pain for the past 2 months and was undergoing a cardiac work up  Patient was scheduled for a cardiac cath this morning which she refused as she was told that she would be transferred out should she need any intervention       Patient received lopressor 5 mg IV x 1, Nitropaste,  mg po x 1, 1mg/kg Lovenox in the ED and was seen by Dr Fernanda Rees  Patient continued with burning and chest pressure on my arrival to the ED  Initial troponin was 0 7         Summary of clinical course:     #Chest pain likely secondary to ACS:   -Trop: 0 7, 5 04, 9 96, 11 08, 11 56, 11 28  -EKG: Sinus tachycardia with PACs  Low voltage QRS inferior infarct indeterminate age   Serial EKG's unchanged    -Continue monitoring on telemetry, serial troponin, and EKGs  -Continue ASA, Statin, Nitro, Metoprolol, Lovenox 1mg/kg q12hr, Morphine PRN chest pain  -Plan for cardiac catheterization today at [facility] being performed by Dr Kirk Collins     #Uncontrolled HTN:  -Received lopressor 5mg IV x 1 in ED with response  -Continue lopressor IV and po   -monitor vitals     #Uncontrolled T2DM:   -HgbA1c: 7 0 on 9/18/17  -Continue accucheks and coverage with ISS q6hr while NPO     #Fibromyalgia/DJD/Chronic pain:   -Resume home percocet     #IBS:   -No concerns for a flare at this time     #Urinary incontinence:   -stable     #Hx of cataracts  -recent cataract surgery  -patient will bring in eye drops from home     #Uatsdin  -does not accept blood products        Global: [email or web address], Lovenox 1mg/kg q12hr, NPO    Recent or scheduled procedures:   Cardiac catheterization at [facility] at 1pm 10/20/17    Outstanding/pending diagnostics: None    Cultures: None       Mobilization Plan: Bed rst    Nutrition Plan: NPO        Spoke with Dr Folrinda Bajwa  regarding transfer  Please call [phone]  with any questions or concerns  Portions of the record may have been created with voice recognition software  Occasional wrong word or "sound a like" substitutions may have occurred due to the inherent limitations of voice recognition software  Read the chart carefully and recognize, using context, where substitutions have occurred      Winsome Mendenhall MD

## 2017-10-20 NOTE — CASE MANAGEMENT
Initial Clinical Review    Admission: Date/Time/Statement: 10/19/17 @ 1356     Orders Placed This Encounter   Procedures    Inpatient Admission (expected length of stay for this patient is greater than two midnights)     Standing Status:   Standing     Number of Occurrences:   1     Order Specific Question:   Admitting Physician     Answer:   Marge Cheney     Order Specific Question:   Level of Care     Answer:   Med Surg [16]     Order Specific Question:   Estimated length of stay     Answer:   More than 2 Midnights     Order Specific Question:   Certification     Answer:   I certify that inpatient services are medically necessary for this patient for a duration of greater than two midnights  See H&P and MD Progress Notes for additional information about the patient's course of treatment  ED: Date/Time/Mode of Arrival:   ED Arrival Information     Expected Arrival Acuity Means of Arrival Escorted By Service Admission Type    - 10/19/2017 11:48 Emergent Walk-In Family Member General Medicine Emergency    Arrival Complaint    CHEST PAIN       Chief Complaint:   Chief Complaint   Patient presents with    Chest Pain     started with chest pain around 9:30am this morning, pain radiates to left arm   History of Illness:   Vidya Yap is a 65 y o  morbidly obese non-compliant F with history of Uncontrolled T2DM with neuropathy, HTN, PVD, dyslipidemia, DJD, fibromyalgia, IBS, urinary incontinence who presents with constant left-sided substernal pressure radiating to the left arm and aggravated with exertion and associated with nausea and sob since 0930 today  Patient states she has been experiencing similar pain for the past 2 months and was undergoing a cardiac work up  Patient was scheduled for a cardiac cath this morning which she refused as she was told that she would be transferred out should she need any intervention     ED Vital Signs:   ED Triage Vitals [10/19/17 1155]   Temperature Pulse Respirations Blood Pressure SpO2   98 6 °F (37 °C) (!) 114 22 144/92 100 %      Temp Source Heart Rate Source Patient Position - Orthostatic VS BP Location FiO2 (%)   Temporal Monitor Lying Right arm --      Pain Score       Worst Possible Pain        Wt Readings from Last 1 Encounters:   10/20/17 86 7 kg (191 lb 2 2 oz)   Vital Signs (abnormal):   /77  Abnormal Labs/Diagnostic Test Results:   WBC 15 60 GLUC 327   TROP 0 07, 5 04, 9 96, 11 08, 11 56  ECG 12 Lead Documentation  Date/Time: 10/19/2017 12:11 PM  Performed by: Femi Trotter  Authorized by: Marty TYLER   Comments:      Sinus tachycardia with PACs low-voltage QRS inferior infarct age indeterminate  Nonspecific changes  No can change from old ECG  CXR=No active pulmonary disease  ED Treatment:   Medication Administration from 10/19/2017 1148 to 10/19/2017 1501       Date/Time Order Dose Route Action Action by Comments     10/19/2017 1232 aspirin chewable tablet 324 mg 324 mg Oral Given Aleksey Su RN      10/19/2017 1346 nitroglycerin (NITRO-BID) 2 % TD ointment 1 inch 1 inch Topical Given Aleksey Su /85     10/19/2017 1347 enoxaparin (LOVENOX) subcutaneous injection 90 mg 90 mg Subcutaneous Given Aleksey Su RN      10/19/2017 1400 metoprolol (LOPRESSOR) injection 5 mg 5 mg Intravenous Given Aleksey Su RN       Past Medical/Surgical History:    Active Ambulatory Problems     Diagnosis Date Noted    Cataract 05/16/2016    Back pain 06/26/2017    Left knee pain 06/26/2017    Fibromyalgia 06/26/2017    Non-insulin dependent type 2 diabetes mellitus ([facility] [address] ) 06/26/2017    IBS (irritable bowel syndrome) 06/26/2017    Urinary incontinence 06/26/2017    Hyperlipidemia 06/26/2017    Hypertension 06/26/2017    Decreased mobility 06/27/2017     Resolved Ambulatory Problems     Diagnosis Date Noted    Chest pain 06/25/2017    Combined forms of age-related cataract of left eye 10/11/2017     Past Medical History:   Diagnosis Date    Arthritis     Diabetes mellitus ([facility] [address] )     Fall with injury 02/15/2017    Fibromyalgia     Fibromyalgia, primary     Frequent falls 05/2017    Glaucoma     Herniated cervical disc     Hyperlipidemia     Hypertension     Irritable bowel syndrome     Liver disease     Lumbar herniated disc     Macular degeneration     Neuropathy     Obesity     Pancreas disorder     Shortness of breath     Urinary incontinence    Admitting Diagnosis: Chest pain [R07 9]  NSTEMI (non-ST elevated myocardial infarction) ([facility] [address] ) [I21 4]  Age/Sex: 65 y o  female  Assessment/Plan:   Patient will be admitted under the service of Dr Soraya Ferrell with expected stay of <2 midnights  Plan of care and management discussed with Dr Soraya Ferrell and agreed by her  Patient is a full code  Planned discharge to home once medically stable  #Chest pain likely secondary to ACS:   -Trop: 0 7, 5 04  -EKG: Sinus tachycardia with PACs   Low voltage QRS inferior infarct indeterminate age    -Continue monitoring on telemetry, serial troponin, and EKGs  -Continue ASA, Statin, Nitro, Metoprolol, Lovenox 1mg/kg q12hr, Morphine PRN chest pain  -Plan for cardiac catheterization tomorrow at [facility], will transfer out earlier if patient has EKG changes or worsening troponin or chest pain  -Dr Venu Jo following  #Uncontrolled HTN:  -Received lopressor 5mg IV x 1 in ED with response  -Continue lopressor IV and po   -monitor vitals  #Uncontrolled T2DM:   -HgbA1c: 7 0 on 9/18/17  -Continue accucheks and coverage with ISS q6hr while NPO  #Fibromyalgia/DJD/Chronic pain:   -Resume home percocet  #IBS:   -No concerns for a flare at this time  #Urinary incontinence:   -stable  #Hx of cataracts  -recent cataract surgery  -patient will bring in eye drops from home  #Confucianist  -does not accept blood products  Global: [email or web address], Lovenox 1mg/kg q12hr, NPO   Admission Orders:  Scheduled Meds:   VIT C QD  ASA QD  LIPITOR QD  LOVENOX SQ Q12H  FISH OIL BID  LOPRESSOR IV X2 THEN PO TID  MVI QD  Continuous Infusions  IVF @ 75CC/HR   PRN Meds:   TYLENOL   TUMS  IV MORPHINE Q3H

## 2017-10-21 VITALS
HEIGHT: 62 IN | WEIGHT: 191.14 LBS | SYSTOLIC BLOOD PRESSURE: 143 MMHG | BODY MASS INDEX: 35.17 KG/M2 | TEMPERATURE: 97.6 F | DIASTOLIC BLOOD PRESSURE: 66 MMHG | HEART RATE: 72 BPM | OXYGEN SATURATION: 97 % | RESPIRATION RATE: 18 BRPM

## 2017-10-21 LAB
ANION GAP SERPL CALCULATED.3IONS-SCNC: 9 MMOL/L (ref 4–13)
ATRIAL RATE: 69 BPM
BUN SERPL-MCNC: 15 MG/DL (ref 5–25)
CALCIUM SERPL-MCNC: 8.8 MG/DL (ref 8.3–10.1)
CHLORIDE SERPL-SCNC: 107 MMOL/L (ref 100–108)
CO2 SERPL-SCNC: 21 MMOL/L (ref 21–32)
CREAT SERPL-MCNC: 0.6 MG/DL (ref 0.6–1.3)
ERYTHROCYTE [DISTWIDTH] IN BLOOD BY AUTOMATED COUNT: 12.6 % (ref 11.6–15.1)
GFR SERPL CREATININE-BSD FRML MDRD: 88 ML/MIN/1.73SQ M
GLUCOSE SERPL-MCNC: 125 MG/DL (ref 65–140)
HCT VFR BLD AUTO: 39.5 % (ref 34.8–46.1)
HGB BLD-MCNC: 12.9 G/DL (ref 11.5–15.4)
MCH RBC QN AUTO: 30 PG (ref 26.8–34.3)
MCHC RBC AUTO-ENTMCNC: 32.7 G/DL (ref 31.4–37.4)
MCV RBC AUTO: 92 FL (ref 82–98)
MRSA NOSE QL CULT: NORMAL
P AXIS: -28 DEGREES
PLATELET # BLD AUTO: 164 THOUSANDS/UL (ref 149–390)
PMV BLD AUTO: 11.6 FL (ref 8.9–12.7)
POTASSIUM SERPL-SCNC: 3.5 MMOL/L (ref 3.5–5.3)
PR INTERVAL: 160 MS
QRS AXIS: -26 DEGREES
QRSD INTERVAL: 82 MS
QT INTERVAL: 378 MS
QTC INTERVAL: 405 MS
RBC # BLD AUTO: 4.3 MILLION/UL (ref 3.81–5.12)
SODIUM SERPL-SCNC: 137 MMOL/L (ref 136–145)
T WAVE AXIS: 143 DEGREES
VENTRICULAR RATE: 69 BPM
WBC # BLD AUTO: 11.6 THOUSAND/UL (ref 4.31–10.16)

## 2017-10-21 PROCEDURE — 85027 COMPLETE CBC AUTOMATED: CPT | Performed by: NURSE PRACTITIONER

## 2017-10-21 PROCEDURE — 80048 BASIC METABOLIC PNL TOTAL CA: CPT | Performed by: NURSE PRACTITIONER

## 2017-10-21 RX ORDER — ASPIRIN 81 MG/1
81 TABLET, CHEWABLE ORAL DAILY
Qty: 30 TABLET | Refills: 0 | Status: SHIPPED | OUTPATIENT
Start: 2017-10-22 | End: 2018-06-14 | Stop reason: SDUPTHER

## 2017-10-21 RX ORDER — KETOROLAC TROMETHAMINE 5 MG/ML
1 SOLUTION OPHTHALMIC 2 TIMES DAILY
COMMUNITY
End: 2018-06-14 | Stop reason: SDUPTHER

## 2017-10-21 RX ORDER — PRAVASTATIN SODIUM 40 MG
40 TABLET ORAL
Qty: 30 TABLET | Refills: 0 | Status: SHIPPED | OUTPATIENT
Start: 2017-10-21 | End: 2018-05-15 | Stop reason: SDUPTHER

## 2017-10-21 RX ORDER — KETOROLAC TROMETHAMINE 5 MG/ML
1 SOLUTION OPHTHALMIC 4 TIMES DAILY
Status: DISCONTINUED | OUTPATIENT
Start: 2017-10-21 | End: 2017-10-21 | Stop reason: HOSPADM

## 2017-10-21 RX ORDER — PREDNISOLONE ACETATE 10 MG/ML
1 SUSPENSION/ DROPS OPHTHALMIC
COMMUNITY

## 2017-10-21 RX ORDER — CLOPIDOGREL BISULFATE 75 MG/1
75 TABLET ORAL DAILY
Qty: 30 TABLET | Refills: 0 | Status: SHIPPED | OUTPATIENT
Start: 2017-10-21 | End: 2018-06-14 | Stop reason: SDUPTHER

## 2017-10-21 RX ORDER — PREDNISOLONE ACETATE 10 MG/ML
1 SUSPENSION/ DROPS OPHTHALMIC 3 TIMES DAILY
Status: DISCONTINUED | OUTPATIENT
Start: 2017-10-21 | End: 2017-10-21 | Stop reason: HOSPADM

## 2017-10-21 RX ADMIN — METOPROLOL SUCCINATE 50 MG: 50 TABLET, EXTENDED RELEASE ORAL at 08:08

## 2017-10-21 RX ADMIN — PREDNISOLONE ACETATE 1 DROP: 10 SUSPENSION/ DROPS OPHTHALMIC at 16:06

## 2017-10-21 RX ADMIN — PRAVASTATIN SODIUM 40 MG: 40 TABLET ORAL at 16:04

## 2017-10-21 RX ADMIN — ASPIRIN 81 MG 81 MG: 81 TABLET ORAL at 08:07

## 2017-10-21 RX ADMIN — KETOROLAC TROMETHAMINE 1 DROP: 5 SOLUTION OPHTHALMIC at 16:06

## 2017-10-21 RX ADMIN — TICAGRELOR 90 MG: 90 TABLET ORAL at 08:08

## 2017-10-21 NOTE — PROGRESS NOTES
Discussed with patient  Examined her and her groin  Normal femoral pulse  No bruit, no hematoma  Dressing removed  Had brief chest pain yesterday that is non coronary  ECG without any significant changes  She did have single-vessel LAD disease and stent thrombosis would have a much more dramatic/acute presentation    She will be discharged home with outpatient follow-up with Dr Desiree Conroy

## 2017-10-21 NOTE — DISCHARGE SUMMARY
Discharge Summary - [facility] [place] [facility]    Patient Information: Dayana Soto 65 y o  female MRN: [id number]  Unit/Bed#: -01 Encounter: [id number]    Discharging Physician / Practitioner: Jewelene Gosselin, MD  PCP: Kain Tay DO  Admission Date: 10/20/2017  Discharge Date: 10/21/17    Reason for Admission:  Non ST MI  Discharge Diagnoses:     Principal Problem:    NSTEMI (non-ST elevated myocardial infarction) [facility])  Active Problems:    Non-insulin dependent type 2 diabetes mellitus ([address] )    Hyperlipidemia    Hypertension    Cataract    Fibromyalgia    IBS (irritable bowel syndrome)  Resolved Problems:    * No resolved hospital problems  *      Consultations During Hospital Stay:  · Cardiology    Procedures Performed:     · Cardiac catheterization     Significant Findings / Test Results:     Diagnostic Cardiologist:  Brice Patrick MD  Interventional Cardiologist:  Brice Patrick MD  Primary Physician: Kain Tay DO     SUMMARY     CORONARY CIRCULATION:  Mid LAD: There was a tubular 99 % stenosis at a site with no prior intervention  The lesion was associated with a small filling defect consistent with thrombus  There was IRASEMA grade 3 flow through the vessel (brisk flow)  This lesion is a  likely culprit for the patient's recent myocardial infarction  An intervention was performed  2 75 x 23 mm KASEY stent decreasing 99% stenosis to 0%  Proximal RCA: The vessel was normal sized, mildly calcified, and mildly tortuous  Angiography showed mild atherosclerosis  There was separate ostium of RV marginal branch There was a discrete 40 % stenosis      CARDIAC STRUCTURES:  EF calculated by contrast ventriculography was 40 %, with carrie apical severe Hypokinesis     HEMODYNAMICS:  Hemodynamic assessment demonstrated mildly elevated LVEDP      1ST LESION INTERVENTIONS:  A balloon angioplasty procedure was performed on the lesion in the mid LAD    A Xience Alpine Rx 2 75 x 23mm drug-eluting stent was placed across the lesion and deployed at a maximum inflation pressure of 11 naomi      2ND LESION INTERVENTIONS:  A balloon angioplasty procedure was performed on the lesion in the 1st diagonal      PROCEDURES PERFORMED     --  Left heart catheterization with ventriculography  --  Left coronary angiography  --  Right coronary angiography  --  Inpatient  --  Mod Sedation Same Physician Add 15min  --  Coronary Catheterization (w/ [facility])  --  Coronary Drug Eluting Stent w/PTCA  --  PTCA, Additional Branch of Coronary Artery  --  Intervention on mid LAD: balloon angioplasty  --  Intervention on D1: balloon angioplasty      PROCEDURE: The risks and alternatives of the procedures and conscious sedation were explained to the patient and informed consent was obtained  The patient was brought to the cath lab and placed on the table  The planned puncture sites  were prepped and draped in the usual sterile fashion      --  Right femoral artery access  The puncture site was infiltrated with local anesthetic  The vessel was accessed using the modified Seldinger technique, a wire was advanced into the vessel, and a sheath was advanced over the wire into the  vessel      --  Left heart catheterization with ventriculography  A catheter was advanced over a guidewire into the ascending aorta  After recording ascending aortic pressure, the catheter was advanced across the aortic valve and left ventricular  pressure was recorded  Ventriculography was performed  The catheter was pulled back across the aortic valve and into the ascending aorta and pullback pressures were obtained      --  Left coronary artery angiography  A catheter was advanced over a guidewire into the aorta and positioned in the left coronary artery ostium under fluoroscopic guidance  Angiography was performed      --  Right coronary artery angiography   A catheter was advanced over a guidewire into the aorta and positioned in the right coronary artery ostium under fluoroscopic guidance  Angiography was performed      --  Inpatient      --  Mod Sedation Same Physician Add 15min      --  Coronary Catheterization (w/ [facility])      LESION #1 INTERVENTION: A balloon angioplasty procedure was performed on the lesion in the mid LAD  This was a bifurcation lesion  This was an ACC/AHA type B "moderate risk" lesion for intervention  There was IRASEMA 3 flow before the  procedure and IRASEMA 3 flow after the procedure      --  Vessel setup was performed  A Launcher 6Fr Ebu 3 5 guiding catheter was used to cannulate the vessel      --  Vessel setup was performed  A Luge J 182cm wire was used to cross the lesion      --  Balloon angioplasty was performed, using a Trek Rx 2 25 x 12mm balloon, with 2 inflations and a maximum inflation pressure of 12 naomi      --  A Xience Alpine Rx 2 75 x 23mm drug-eluting stent was placed across the lesion and deployed at a maximum inflation pressure of 11 naomi      LESION #2 INTERVENTION: A balloon angioplasty procedure was performed on the lesion in the 1st diagonal  This was an ACC/AHA type A "low risk" lesion for intervention  There was IRASEMA 3 flow before the procedure and IRASEMA 3 flow after the  procedure      --  Vessel setup was performed  A Luge J 182cm wire was used to cross the lesion      --  Vessel setup was performed  A Launcher 6Fr Ebu 3 5 guiding catheter was used to cannulate the vessel      --  Balloon angioplasty was performed, using a Mini Trek Rx 2 0 x 08mm balloon, with 2 inflations and a maximum inflation pressure of 8 naomi      INTERVENTIONS:  --  Coronary Drug Eluting Stent w/PTCA      --  PTCA, Additional Branch of Coronary Artery      PROCEDURE COMPLETION: The patient tolerated the procedure well and was discharged from the cath lab  TIMING: Test started at 10:53  Test concluded at 11:48  HEMOSTASIS: The sheath was removed over a wire and the Angioseal delivery sheath  was inserted into the femoral artery   Hemostasis was obtained using a closure device ( Angioseal) deployed through the delivery sheath  MEDICATIONS GIVEN: Solucortef (100mg/2ml), 100 mg, IV, at 10:54  Benadryl (50mg/ml), 25 mg, IV, at  11:05  Fentanyl (1OOmcg/2 ml), 25 mcg, IV, at 11:09  Versed (2mg/2ml), 1 mg, IV, at 11:09  1% Lidocaine, 10 ml, subcutaneously, at 11:10  Ticagrelor, 180 mg, PO, at 11:17  Angiomax Bolus(250mg/50ml), 12 8 ml, IV, at 11:19  Angiomax Drip  (250mg/50ml), infusion rate of 30 3 ml/hr, IV, at 11:22  Nitroglycerin (200mcg/ml), 200 mcg, at 11:28  Fentanyl (1OOmcg/2 ml), 25 mcg, IV, at 11:36  Versed (2mg/2ml), 0 5 mg, IV, at 11:36  Fentanyl (1OOmcg/2 ml), 25 mcg, IV, at 11:41  Versed (2mg/2ml), 0 5 mg, IV, at 11:41  CONTRAST GIVEN: 150 ml Omnipaque (350 mg I /ml)  75 ml Omnipaque (350mg I /ml)  RADIATION EXPOSURE: Fluoroscopy time: 9 3 min      HEMODYNAMICS: Hemodynamic assessment demonstrated mildly elevated LVEDP      VENTRICLES:   --  EF calculated by contrast ventriculography was 40 %, with carrie apical severe Hypokinesis     CORONARY VESSELS:   --  The coronary circulation is right dominant  --  Distal left main: The vessel was normal sized and mildly calcified  Angiography showed mild atherosclerosis  --  Proximal LAD: The vessel was large sized and moderately calcified  Angiography showed moderate atherosclerosis  --  Mid LAD: The vessel was large sized  Angiography showed moderate atherosclerosis  There was a tubular 99 % stenosis at a site with no prior intervention  The lesion was associated with a small filling defect consistent with thrombus  There was IRASEMA grade 3 flow through the vessel (brisk flow)  This lesion is a likely culprit for the patient's recent myocardial infarction  An intervention was performed  2 75 x 23 mm KASEY stent decreasing 99% stenosis to 0%  --  Distal LAD: Angiography showed minor luminal irregularities  --  1st diagonal: The vessel was medium sized and mildly calcified  Angiography showed mild atherosclerosis  There was a discrete 30 % stenosis  --  Proximal circumflex: The vessel was medium sized and mildly calcified  Angiography showed mild atherosclerosis  There was a tubular 30 % stenosis  --  Mid circumflex: Angiography showed minor luminal irregularities  --  Distal circumflex: Angiography showed minor luminal irregularities  --  Proximal RCA: The vessel was normal sized, mildly calcified, and mildly tortuous  Angiography showed mild atherosclerosis  There was separate ostium of RV marginal branch There was a discrete 40 % stenosis  --  Distal RCA: Angiography showed minor luminal irregularities  --  Right PDA: Angiography showed minor luminal irregularities      RECOMMENDATIONS  Continue dual platelet therapy for at least one year , statins     Prepared and signed by  Starla Larson MD  Signed 10/20/2017 18:43:40     Study diagram     Angiographic findings  Native coronary lesions:  ·Mid LAD: Lesion 1: tubular, 99 % stenosis  ·D1: Lesion 1: discrete, 30 % stenosis  ·Proximal circumflex: Lesion 1: tubular, 30 % stenosis  ·Proximal RCA: Lesion 1: discrete, 40 % stenosis  Intervention results  Native coronary lesions:  · balloon angioplasty of mid LAD  Stent: Xience Alpine Rx 2 75 x 23mm drug-eluting  · balloon angioplasty of D1      Hemodynamic tables     Pressures:  Baseline  Pressures:  - HR: 77  Pressures:  - Rhythm:  Pressures:  -- Aortic Pressure (S/D/M): 181/74/111  Pressures:  -- Left Ventricle (s/edp): 174/38/--     Outputs:  Baseline  Outputs:  -- CALCULATIONS: [age]  Outputs:  -- CALCULATIONS: Body Surface Area: 1 87  Outputs:  -- CALCULATIONS: Height in cm: 157 00  Outputs:  -- CALCULATIONS: Sex: Female  Outputs:  -- CALCULATIONS: Weight in k 70    Incidental Findings:   · none    Test Results Pending at Discharge (will require follow up):   · none     Outpatient Tests Requested:  · none    Complications:  none  Hospital Course:     Barbra Catalina henriquez 65 y  o  morbidly obese non-compliant F with history of Uncontrolled T2DM with neuropathy, HTN, PVD, dyslipidemia, DJD, fibromyalgia, IBS, urinary incontinence who presents with constant left-sided substernal pressure radiating to the left arm and aggravated with exertion and associated with nausea and sob  Patient states she has been experiencing similar pain for the past 2 months and was undergoing a cardiac work up  Patient was scheduled for a cardiac cath this morning which she refused as she was told that she would be transferred out should she need any intervention  She has iodin dye allergies   Patient received lopressor 5 mg IV x 1, Nitropaste,  mg po x 1, 1mg/kg Lovenox in the ED and was seen by Dr Roya Phillip  Patient continued with burning and chest pressure on my arrival to the ED  Initial troponin was 0 7  >11 35*  Patient was taken to the OR for cardiac catheterization  A stent of the LAD  No complications reported  As per evaluation today patient denied chest pain, palpitation short of breath  Condition at Discharge: good     Discharge Day Visit / Exam:     Subjective:  I am good  Patient denies chest pain, palpitation, short of breath   Vitals: Blood Pressure: 133/62 (10/21/17 1100)  Pulse: 62 (10/21/17 1100)  Temperature: 97 8 °F (36 6 °C) (10/21/17 0700)  Temp Source: Oral (10/21/17 0700)  Respirations: 18 (10/21/17 1100)  Height: 5' 2" (157 5 cm) (10/20/17 0954)  Weight - Scale: 86 7 kg (191 lb 2 2 oz) (10/20/17 0954)  SpO2: 97 % (10/21/17 1100)  Exam:   Physical Exam   Constitutional: She is oriented to person, place, and time  No distress  HENT:   Head: Normocephalic and atraumatic  Mouth/Throat: No oropharyngeal exudate  Eyes: Conjunctivae and EOM are normal  Pupils are equal, round, and reactive to light  Left eye exhibits no discharge  No scleral icterus  Neck: Normal range of motion  Neck supple  No JVD present  No tracheal deviation present  No thyromegaly present     Cardiovascular: Normal rate and normal heart sounds  Exam reveals no friction rub  No murmur heard  Abdominal: Soft  Bowel sounds are normal  She exhibits no distension and no mass  There is no tenderness  There is no rebound and no guarding  Musculoskeletal: Normal range of motion  She exhibits no edema, tenderness or deformity  Lymphadenopathy:     She has no cervical adenopathy  Neurological: She is alert and oriented to person, place, and time  She has normal reflexes  No cranial nerve deficit  Coordination normal    Skin: Skin is warm  She is not diaphoretic  Psychiatric: She has a normal mood and affect  Discussion with Family:patient  Discharge instructions/Information to patient and family:   See after visit summary for information provided to patient and family  Provisions for Follow-Up Care:  See after visit summary for information related to follow-up care and any pertinent home health orders  Disposition:     Home    For Discharges to [facility] SNF:   · Not Applicable to this Patient - Not Applicable to this Patient    Planned Readmission: none   Discharge Statement:  I spent 30 minutes discharging the patient  This time was spent on the day of discharge  I had direct contact with the patient on the day of discharge  Greater than 50% of the total time was spent examining patient, answering all patient questions, arranging and discussing plan of care with patient as well as directly providing post-discharge instructions  Additional time then spent on discharge activities  Discharge Medications:  See after visit summary for reconciled discharge medications provided to patient and family        ** Please Note: This note has been constructed using a voice recognition system **

## 2017-10-21 NOTE — SOCIAL WORK
CM spoke with Ernestine Knutson from [facility] regarding Brilinita cost   First 30 days would be free but monthly co-payment going forward would be $[phone]  CM discussed this with patient and [facility] and this is not something she can afford  [facility] will be sending patient home with Plavix instead  No further CM discharge needs discussed or identified

## 2017-10-23 ENCOUNTER — GENERIC CONVERSION - ENCOUNTER (OUTPATIENT)
Dept: OTHER | Facility: OTHER | Age: 77
End: 2017-10-23

## 2017-10-23 LAB
ATRIAL RATE: 105 BPM
P AXIS: 67 DEGREES
PR INTERVAL: 184 MS
QRS AXIS: 8 DEGREES
QRSD INTERVAL: 92 MS
QT INTERVAL: 344 MS
QTC INTERVAL: 454 MS
T WAVE AXIS: 37 DEGREES
VENTRICULAR RATE: 105 BPM

## 2017-10-25 ENCOUNTER — ALLSCRIPTS OFFICE VISIT (OUTPATIENT)
Dept: OTHER | Facility: OTHER | Age: 77
End: 2017-10-25

## 2017-10-27 LAB
ATRIAL RATE: 81 BPM
P AXIS: 30 DEGREES
PR INTERVAL: 186 MS
QRS AXIS: -2 DEGREES
QRSD INTERVAL: 92 MS
QT INTERVAL: 384 MS
QTC INTERVAL: 446 MS
T WAVE AXIS: 37 DEGREES
VENTRICULAR RATE: 81 BPM

## 2017-10-30 ENCOUNTER — GENERIC CONVERSION - ENCOUNTER (OUTPATIENT)
Dept: OTHER | Facility: OTHER | Age: 77
End: 2017-10-30

## 2017-10-30 ENCOUNTER — ALLSCRIPTS OFFICE VISIT (OUTPATIENT)
Dept: OTHER | Facility: OTHER | Age: 77
End: 2017-10-30

## 2017-11-03 ENCOUNTER — ALLSCRIPTS OFFICE VISIT (OUTPATIENT)
Dept: OTHER | Facility: OTHER | Age: 77
End: 2017-11-03

## 2017-11-08 NOTE — PROGRESS NOTES
Assessment  1  Non-smoker (V49 89) (Z78 9)   2  Callus (700) (L84)   3  Atherosclerotic peripheral vascular disease (440 20) (I70 209)   4  Acquired ankle/foot deformity (736 70) (M21 969)   5  Pain in both feet (729 5) (M79 671,M79 672)   6  Diabetes mellitus with polyneuropathy (250 60,357 2) (E11 42)    Plan    · *VB - Foot Exam; Status:Complete;   Done: 52LBV1886 04:26PM   · Diabetes Foot Exam Performed; Status:Complete;   Done: 69QQT4381   · Inspect your feet daily ; Status:Complete;   Done: 22EHN7581        Follow-up visit in 3 months Evaluation and Treatment  Follow-up  Status: Hold For - Scheduling  Requested for: 92BDF7770  Ordered; For: Pain in both feet;  Ordered By: Rhoda Irizarry  Performed:   Due: 18MQO3745   Follow-up visit in 9 weeks Evaluation and Treatment  Follow-up  Status: Hold For - Scheduling  Requested for: 80XVF0428  Ordered; For: Diabetes mellitus with polyneuropathy;  Ordered By: Rhoda Irizarry  Performed:   Due: 48BYH3691  Diabetes mellitus with polyneuropathy (250 60) (E11 42)          Discussion/Summary    Daily foot checks monitor for signs of infection  The patient was counseled regarding instructions for management,-- risk factor reductions,-- prognosis,-- patient and family education,-- impressions,-- risks and benefits of treatment options,-- importance of compliance with treatment  The treatment plan was reviewed with the patient/guardian  The patient/guardian understands and agrees with the treatment plan      Chief Complaint  Patient has Painful calluses and thick painful nails  Pain when walking and wearing shoes  History of Present Illness  HPI: Patient is Temple medicines that she does not like medical intervention when Not necessaryhas history of fibromyalgia  diabetic female with a history of macular degeneration  recently had MI and cardiac catheterization was put on Coumadin  Active Problems   1   Acquired ankle/foot deformity (736 70) (M21 969) 2  Acute pain of left shoulder (719 41) (M25 512)   3  Anterior tibialis tendinitis of left lower extremity (726 72) (M76 812)   4  Arthralgia of left foot (719 47) (M25 572)   5  BMI 33 0-33 9,adult (V85 33) (Z68 33)   6  Callus (700) (L84)   7  Cataracts, both eyes (366 9) (H26 9)   8  Cervicalgia (723 1) (M54 2)   9  Change in bowel habits (787 99) (R19 4)   10  Fibromyalgia (729 1) (M79 7)   11  Glaucoma (365 9) (H40 9)   12  Knee pain, right (719 46) (M25 561)   13  Leg pain, right (729 5) (M79 604)   14  Multilevel degenerative disc disease (722 6) (M53 9)   15  Noncompliance of patient with other medical treatment and regimen (V15 81) (Z91 19)   16  Onychomycosis (110 1) (B35 1)   17  Pain in both feet (729 5) (M79 671,M79 672)   18  Poorly controlled type 2 diabetes mellitus (250 00) (E11 65)   19  Preoperative clearance (V72 84) (Z01 818)   20  Primary localized osteoarthritis of both knees (715 16) (M17 0)   21  Recurrent falls (V15 88) (R29 6)   22  Risk for falls (V15 88) (Z91 81)   23  Sciatica of left side (724 3) (M54 32)   24  Screening for depression (V79 0) (Z13 89)   25  Screening for diabetes mellitus (V77 1) (Z13 1)   26  Screening for osteoporosis (V82 81) (Z13 820)   27  Shortness of breath (786 05) (R06 02)   28  Tear of left supraspinatus tendon, subsequent encounter (V58 89,840 6) (S46 812D)   29  Transition of care   30   Visit for screening mammogram (V76 12) (Z12 31)    Hyperlipidemia (272 4) (E78 5)       Atherosclerotic peripheral vascular disease (440 20) (I70 209)       Diabetes mellitus with polyneuropathy (250 60) (E11 42)       Chest pain (786 50) (R07 9)          Past Medical History   · History of Abdominal pain, epigastric (789 06) (R10 13)   · History of Actinomycotic sepsis (038 8,995 91) (A42 7)   · History of Acute upper respiratory infection (465 9) (J06 9)   · History of Acute URI (465 9) (J06 9)   · History of Anxiety (300 00) (F41 9)   · History of Bee sting, accidental or unintentional, initial encounter (989 5,E905 3)  (T63 441A)   · History of BMI 34 0-34 9,adult (V85 34) (Z68 34)   · History of Diabetes Mellitus (250 00)   · History of Encounter for routine gynecological examination (V72 31) (Z01 419)   · History of Encounter for special screening examination for genitourinary disorder (V81 6)  (Z13 89)   · History of acute bronchitis (V12 69) (Z87 09)   · History of dermatitis (V13 3) (Z87 2)   · History of essential hypertension (V12 59) (Z86 79)   · History of hair or hair follicle disorder (R52 2) (G49 140)   · History of herpes zoster (V12 09) (Z86 19)   · History of hyperlipidemia (V12 29) (Z86 39)   · History of hypertension (V12 59) (Z86 79)   · History of insomnia (V13 89) (Z87 898)   · History of low back pain (V13 59) (Z87 39)   · History of urinary frequency (V13 09) (Z87 898)   · History of Impacted cerumen, unspecified laterality (380 4) (H61 20)   · History of Infective otitis externa, unspecified laterality (380 10) (H60 399)   · History of Knee contusion (924 11) (S80 00XA)   · History of Meralgia paresthetica (355 1) (G57 10)   · History of Meralgia paresthetica (355 1) (G57 10)   · History of Noncompliance of patient with other medical treatment and regimen (V15 81)  (Z91 19)   · History of Noncompliance of patient with other medical treatment and regimen (V15 81)  (Z91 19)   · History of Nonvenomous Insect Bite Of Forearm (913 4)   · History of Rib injury   · History of Screening for heart disease (V81 2) (Z13 6)   · History of Skin rash (782 1) (R21)   · History of Sprained Ribs (848 3)   · History of Trigeminal neuralgia (350 1) (G50 0)   · History of UTI (lower urinary tract infection) (599 0) (N39 0)   · History of Visit for pre-operative examination (V72 84) (Z01 818)   · History of Xerosis cutis (706 8) (L85 3)    The active problems and past medical history were reviewed and updated today       Acute bronchitis (466 0) (J20 9)             Family History  Mother    · No significant family history  Father    · No significant family history    The family history was reviewed and updated today  Social History   · Denied: History of Alcohol Use (History)   · Denied: History of Drug Use   · Never A Smoker   · Non-smoker (V49 89) (Z78 9)    Current Meds   1  Lumigan 0 01 % Ophthalmic Solution; Therapy: (Recorded:29Mar2017) to Recorded   2  MetFORMIN HCl - 850 MG Oral Tablet; take 1 tablet by mouth twice a day; Therapy: 95JYA9897 to ()  Requested for: 20QFF8946; Last   Rx:67Gsw2241 Ordered   3  Metoprolol Succinate ER 50 MG Oral Tablet Extended Release 24 Hour; take 1 tablet by   mouth once daily; Therapy: 29AUE5541 to (Evaluate:16Apr2017)  Requested for: 20Jun2016; Last   Rx:20Jun2016 Ordered   4  Omega-3 1000 MG Oral Capsule; TAKE CAPSULE; Therapy: (ONLFXUZS:89XKA8783) to Recorded   5  Oxycodone-Acetaminophen  MG Oral Tablet; TAKE 1 TABLET EVERY 6 HOURS   AS NEEDED; Last Rx:22Feh1676 Ordered   6  Pazeo 0 7 % Ophthalmic Solution; Therapy: (Houston Bourgeois) to Recorded   7  PredniSONE 50 MG Oral Tablet; TAKE 1 TABLET at 7 PM tonight, 1 Tablet at 1 AM and 1   tablet at 7 AM;   Therapy: 89GOV7439 to (Evaluate:22Oct2017)  Requested for: 56JNV7354; Last   Rx:19Oct2017 Ordered   8  ProAir  (90 Base) MCG/ACT Inhalation Aerosol Solution; INHALE 1 TO 2 PUFFS   EVERY 6 HOURS AS NEEDED; Therapy: 24UPO2675 to (Last Rx:30Mar2017)  Requested for: 43XOW3071 Ordered   9  Rosuvastatin Calcium 20 MG Oral Tablet; TAKE 1 TABLET DAILY  Requested for:   20Jun2016; Last Rx:20Jun2016 Ordered    The medication list was reviewed and updated today  Allergies  1  Combigan SOLN  2  IVP Dye   3  No Known Latex Allergies    Vitals   Recorded: 13CTA8760 04:08PM   Height 5 ft 2 in   Weight 197 lb    BMI Calculated 36 03   BSA Calculated 1 9     Physical Exam    Constitutional: no acute distress, well appearing and well nourished  Cardiovascular: abnormal dorsalis pedis pulse,-- abnormal posterior tibialis pulse,-- dependence rubor-- and-- abnormal capillary refill  Orthopedic/Biomechanical: abnormal MPJ ROM-- and-- abnormal midtarsal joint ROM  Skin: keratoses  Psychiatric: oriented to person, place, and time  Left Foot: Appearance: Normal except as noted: Dunia Franklinlikaz Second toe deformities include hammer toe  Third toe deformities include hammer toe  Forth toe deformities include hammer toe  Fifth toe deformities include hammer toe  Hyperkeratotic lesions fifth digit PIPJ bilateral  No pain on palpation of dorsal midfoot no pain on range of motion  Special Tests: left dorsal medial midfoot pain and swelling course of tibialis anterior tendon  No disruption of tendon  Mild edema  Mild pain on  Right Foot: Appearance: Normal except as noted: Sherlynette Soulier Second toe deformities include hammer toe  Third toe deformities include hammer toe  Forth toe deformities include hammer toe  Fifth toe deformities include hammer toe  All nails thick discolored dystrophic, positive subungual debris, positive pain on palpation  Special Tests: Moderate xerosis plantarly bilateral negative erythema no signs of infection  Toenails: All of the toenails were elongated,-- hypertrophied,-- discolored,-- shown to have subungual debris-- and-- tender  Both first toenails were ingrown  Socks and shoes removed, Right Foot Findings: with a(n) --Ucm3 callus  The sensory exam showed diminished vibratory sensation at the level of the toes-- and-- diminished position sense at the level of the toes  Diminished tactile sensation with monofilament testing throughout the right foot  Socks and shoes removed, Left Foot Findings: with a(n) --Ucm3 callus  The sensory exam showed diminished vibratory sensation at the level of the toes-- and-- diminished position sense at the level of the toes  Diminished tactile sensation with monofilament testing throughout the left foot  Capillary refills findings on the right were delayed in the toes  Pulses:   0 in the posterior tibialis on the right   1+ in the dorsalis pedis on the right  Capillary refills findings on the left were delayed in the toes  Pulses:   0 in the posterior tibialis on the left   1+ in the dorsalis pedis on the left  Assign Risk Category: 2: Loss of protective sensation with or without weakness, deformity, callus, pre-ulcer, or history of ulceration  High risk  Hyperkeratosis: present on both first toes-- and-- present on both fifth toes  Results/Data  *VB - Foot Exam 93FKS3602 04:26PM Brodie Villarreal     Test Name Result Flag Reference   FOOT EXAM [id number]         Procedure  Aseptic debridement and planing of nails Ã10, with nail nipper and nail , no complicationsdebridement of pre-trophic hyperkeratotic lesions Ã2 plantar feet bilateral  Debridement with #10 scalpel debrided nonviable tissue down to healthy viable tissue  No complications      Signatures   Electronically signed by :  Amena Hatch DPM; Nov 7 2017  1:31PM EST                       (Author)

## 2018-01-11 NOTE — CONSULTS
Chief Complaint  patient here for pro-op clearance having eye surgery 10/12/17      History of Present Illness  Pre-Op Visit (Brief): The patient is being seen for a preoperative visit and Cataract surgery  Surgical Risk Assessment:   Prior Anesthesia: She had no prior anesthesia, no prior adverse reaction to edidural anesthesia, no prior adverse reaction to spinal anesthesia and no prior adverse reaction to general anesthesia  Pertinent Past Medical History: Diabetes hypertension hyper lipidemia  Lifestyle Factors: denies alcohol use  Symptoms: no easy bleeding, no easy bruising, no heavy menses, no chest pain, no cough, no dyspnea, no edema, no palpitations and no wheezing  Pertinent Family History: no pertinent family history  HPI: Patient is here for a preop clearance for cataract surgery  Of note she did finally get the MRIs that I prescribed for her 6 months ago as well as the blood work  The MRIs to show progression of her bulging disk disease in the MRI of her shoulder shows a partial supraspinatus tear that is now chronic  I now advised her after the eye surgery is done and she settled she is to go to orthopedics for evaluation of injections      Active Problems    1  Acquired ankle/foot deformity (736 70) (M21 969)   2  Acute pain of left shoulder (719 41) (M25 512)   3  Anterior tibialis tendinitis of left lower extremity (726 72) (M76 812)   4  Arthralgia of left foot (719 47) (M25 572)   5  Atherosclerotic peripheral vascular disease (440 20) (I70 209)   6  BMI 33 0-33 9,adult (V85 33) (Z68 33)   7  Callus (700) (L84)   8  Cataracts, both eyes (366 9) (H26 9)   9  Cervicalgia (723 1) (M54 2)   10  Change in bowel habits (787 99) (R19 4)   11  Chest pain (786 50) (R07 9)   12  Diabetes mellitus with polyneuropathy (250 60,357 2) (E11 42)   13  Fibromyalgia (729 1) (M79 7)   14  Glaucoma (365 9) (H40 9)   15  Hyperlipidemia (272 4) (E78 5)   16  Knee pain, right (719 46) (M25 561)   17   Leg pain, right (729 5) (M79 604)   18  Noncompliance of patient with other medical treatment and regimen (V15 81) (Z91 19)   19  Onychomycosis (110 1) (B35 1)   20  Pain in both feet (729 5) (M79 671,M79 672)   21  Poorly controlled type 2 diabetes mellitus (250 00) (E11 65)   22  Primary localized osteoarthritis of both knees (715 16) (M17 0)   23  Recurrent falls (V15 88) (R29 6)   24  Risk for falls (V15 88) (Z91 81)   25  Sciatica of left side (724 3) (M54 32)   26  Screening for depression (V79 0) (Z13 89)   27  Screening for diabetes mellitus (V77 1) (Z13 1)   28  Screening for osteoporosis (V82 81) (Z13 820)   29  Shortness of breath (786 05) (R06 02)   30  Transition of care   31   Visit for screening mammogram (V76 12) (Z12 31)    Past Medical History    · History of Abdominal pain, epigastric (789 06) (R10 13)   · History of Actinomycotic sepsis (038 8,995 91) (A42 7)   · History of Acute upper respiratory infection (465 9) (J06 9)   · History of Acute URI (465 9) (J06 9)   · History of Anxiety (300 00) (F41 9)   · History of Bee sting, accidental or unintentional, initial encounter (989 5,E905 3)  (T63 441A)   · History of BMI 34 0-34 9,adult (V85 34) (Z68 34)   · History of Diabetes Mellitus (250 00)   · History of Encounter for routine gynecological examination (V72 31) (Z01 419)   · History of Encounter for special screening examination for genitourinary disorder (V81 6)  (Z13 89)   · History of acute bronchitis (V12 69) (Z87 09)   · History of dermatitis (V13 3) (Z87 2)   · History of essential hypertension (V12 59) (Z86 79)   · History of hair or hair follicle disorder (E75 1) (C59 802)   · History of herpes zoster (V12 09) (Z86 19)   · History of hyperlipidemia (V12 29) (Z86 39)   · History of hypertension (V12 59) (Z86 79)   · History of insomnia (V13 89) (Z87 898)   · History of low back pain (V13 59) (Z87 39)   · History of urinary frequency (V13 09) (A88 603)   · History of Impacted cerumen, unspecified laterality (380 4) (H61 20)   · History of Infective otitis externa, unspecified laterality (380 10) (H60 399)   · History of Knee contusion (924 11) (S80 00XA)   · History of Meralgia paresthetica (355 1) (G57 10)   · History of Meralgia paresthetica (355 1) (G57 10)   · History of Noncompliance of patient with other medical treatment and regimen (V15 81)  (Z91 19)   · History of Noncompliance of patient with other medical treatment and regimen (V15 81)  (Z91 19)   · History of Nonvenomous Insect Bite Of Forearm (913 4)   · History of Rib injury   · History of Screening for heart disease (V81 2) (Z13 6)   · History of Skin rash (782 1) (R21)   · History of Sprained Ribs (848 3)   · History of Trigeminal neuralgia (350 1) (G50 0)   · History of UTI (lower urinary tract infection) (599 0) (N39 0)   · History of Visit for pre-operative examination (V72 84) (Z01 818)   · History of Xerosis cutis (706 8) (L85 3)    The active problems and past medical history were reviewed and updated today  Family History    · No significant family history    · No significant family history    Social History    · Denied: History of Alcohol Use (History)   · Denied: History of Drug Use   · Never A Smoker    Current Meds   1  Lumigan 0 01 % Ophthalmic Solution; Therapy: (Recorded:29Mar2017) to Recorded   2  MetFORMIN HCl - 850 MG Oral Tablet; take 1 tablet by mouth twice a day; Therapy: 82LOC0364 to (776 5815)  Requested for: 02WON1600; Last   Rx:80Nyo7691 Ordered   3  Metoprolol Succinate ER 50 MG Oral Tablet Extended Release 24 Hour; take 1 tablet by   mouth once daily; Therapy: 93XVD7750 to (Evaluate:16Apr2017)  Requested for: 20Jun2016; Last   Rx:20Jun2016 Ordered   4  Oxycodone-Acetaminophen  MG Oral Tablet; TAKE 1 TABLET EVERY 6 HOURS AS   NEEDED; Last Rx:03Rzw8538 Ordered   5  Pazeo 0 7 % Ophthalmic Solution; Therapy: (Behzad Main) to Recorded   6   ProAir  (90 Base) MCG/ACT Inhalation Aerosol Solution; INHALE 1 TO 2 PUFFS   EVERY 6 HOURS AS NEEDED; Therapy: 79FTM3807 to (Last Rx:30Mar2017)  Requested for: 85MDM2624 Ordered   7  Rosuvastatin Calcium 20 MG Oral Tablet; TAKE 1 TABLET DAILY  Requested for:   20Jun2016; Last Rx:20Jun2016 Ordered    Allergies    1  Combigan SOLN    2  IVP Dye   3  No Known Latex Allergies    Vitals  Signs    Temperature: 97 3 F  Heart Rate: 66  Respiration: 18  Blood Pressure: 130 mm Hg  Blood Pressure: 80 mm Hg  Height: 5 ft 2 in  Weight: 195 lb   BMI Calculated: 35 67 kg/m2  BSA Calculated: 1 89 m2  O2 Saturation: 97    Physical Exam    Constitutional   General appearance: No acute distress, well appearing and well nourished  Head and Face   Head and face: Normal     Palpation of the face and sinuses: No sinus tenderness  Eyes   Conjunctiva and lids: No swelling, erythema or discharge  Ears, Nose, Mouth, and Throat   External inspection of ears and nose: Normal     Oropharynx: Normal with no erythema, edema, exudate or lesions  Pulmonary   Auscultation of lungs: Clear to auscultation  Cardiovascular   Palpation of heart: Normal PMI, no thrills  Assessment    1  Multilevel degenerative disc disease (722 6) (M53 9)   2  Acute pain of left shoulder (719 41) (M25 512)   3  Tear of left supraspinatus tendon, subsequent encounter (V58 89,840 6) (X08 117U)   4  Preoperative clearance (V72 84) (Z01 818)    Plan  Patient is cleared for her cataract surgery  For the multiple orthopedics complaints I advised her after her eye surgery to go to orthopedics for further evaluation for epidural injections and shoulder injection      Discussion/Summary  Surgical Clearance: She is at a LOW risk from a cardiovascular standpoint at this time without any additional cardiac testing  Reevaluation needed, if she should present with symptoms prior to surgery/procedure  End of Encounter Meds    1   MetFORMIN HCl - 850 MG Oral Tablet; take 1 tablet by mouth twice a day;   Therapy: 47BNB0464 to (Evaluate:28Jun2018)  Requested for: 55IHQ7553; Last   Rx:58Lwv8509 Ordered    2  Oxycodone-Acetaminophen  MG Oral Tablet (Percocet); TAKE 1 TABLET EVERY 6   HOURS AS NEEDED; Last Rx:73Qpz7241 Ordered    3  Rosuvastatin Calcium 20 MG Oral Tablet (Crestor); TAKE 1 TABLET DAILY  Requested   for: 20Jun2016; Last Rx:20Jun2016 Ordered    4  ProAir  (90 Base) MCG/ACT Inhalation Aerosol Solution; INHALE 1 TO 2 PUFFS   EVERY 6 HOURS AS NEEDED; Therapy: 53AOG4954 to (Last Rx:30Mar2017)  Requested for: 34IGO3673 Ordered    5  Metoprolol Succinate ER 50 MG Oral Tablet Extended Release 24 Hour; take 1 tablet by   mouth once daily; Therapy: 16MLT7878 to (Evaluate:16Apr2017)  Requested for: 20Jun2016; Last   Rx:20Jun2016 Ordered    6  Lumigan 0 01 % Ophthalmic Solution; Therapy: (Flaquita [place]) to Recorded   7  Pazeo 0 7 % Ophthalmic Solution; Therapy: (Flaquita [place]) to Recorded    Signatures   Electronically signed by :  LISA Beckford ; Oct 11 2017  2:41PM EST                       (Author)

## 2018-01-11 NOTE — MISCELLANEOUS
Assessment    1  CAD (coronary artery disease) (414 00) (I25 10)   2  Hospital discharge follow-up (V67 59) (Z09)   3  History of non-ST elevation myocardial infarction (NSTEMI) (412) (I25 2)    Plan  PMH: History of essential hypertension    · Metoprolol Succinate ER 50 MG Oral Tablet Extended Release 24 Hour; take 1  tablet by mouth once daily   Rx By: Curtis Brock; Dispense: 0 Days ; #:90 Tablet Extended Release 24 Hour; Refill: 3; For: PMH: History of essential hypertension; ARMANDO = N; Call Rx    Discussion/Summary  Discussion Summary:   23-year-old female here for follow-up of recent hospital admission:  -recent admission and hospital was for NSTEMI, with a subsequent placement of stents in LAD: Patient reports to be doing well since the cath, continue to take dual platelet therapy with aspirin and Plavix as recommended by Cardiology for at least 1 year  Blood pressure controlled at this time  Continue to f/u with Cardio   -patient does have history of NIDDM -she does not measure her glucose, as she reports that she is out of the testing strips and lancets, however recent A1c is 7 --and has been trending down over past couple months, she can continue metformin at current dose at this time, will order her lancets and strips, and advised her to check her glucose at least once daily, or if she cannot do that, once every other day  Next set of diabetic labs will be in 2 months  - refuses flu shot     History of Present Illness  TCM Communication [facility]: The patient is being contacted for follow-up after hospitalization  Hospital records were reviewed  She was hospitalized at and [address] [place] [address]  The dates of hospitalization:, date of admission: 10/19/2017, date of discharge: 10/20/2017  Diagnosis: NSTEMI , NON INSULIN DEPENDENT TYPE 2 DM, HYPERLIPIDEMIA, HYPERTENSION, BACK PAIN  She was discharged to home  Medications reviewed and updated today  She scheduled a follow up appointment     Follow-up appointments with other specialists: DR Joseph Stein 10/30/17  Referrals Needed:  will see Dr Enma Lopez on 10/30/17  Counseling was provided to the patient  Communication performed and completed by Ida Yo LPN   HPI: Patient is here to follow-up on recent hospital admission, she was admitted for a NSTEMI, she was discharged on 10/21/27  Pt got drug eluting stent placement of mid LAD for 99% stenosis  Patient reports that since that discharge, she has been doing really well, the chest pain that she had prior to the admission, when she was having a heart attack, has not recurred since then  She also denies any shortness of breath or dyspnea on exertion  She denies any acute issues  Patient reports that she recently followed up with Dr Enma Lopez, her cardiologist, on 30th of last month - reports everything is well  She is taking Plavix and aspirin at this time  No fevers or chills  Review of Systems  Complete-Female:   Constitutional: no fever, no chills and not feeling tired  Cardiovascular: no chest pain, no palpitations and no lower extremity edema  Respiratory: no shortness of breath, no cough, no wheezing and no shortness of breath during exertion  Gastrointestinal: no nausea, no vomiting and no diarrhea  ROS Reviewed:   ROS reviewed  Active Problems     1  Acute pain of left shoulder (719 41) (M25 512)   2  Anterior tibialis tendinitis of left lower extremity (726 72) (M76 812)   3  Arthralgia of left foot (719 47) (M25 572)   4  BMI 33 0-33 9,adult (V85 33) (Z68 33)   5  Cataracts, both eyes (366 9) (H26 9)   6  Cervicalgia (723 1) (M54 2)   7  Change in bowel habits (787 99) (R19 4)   8  Fibromyalgia (729 1) (M79 7)   9  Glaucoma (365 9) (H40 9)   10  Knee pain, right (719 46) (M25 561)   11  Leg pain, right (729 5) (M79 604)   12  Multilevel degenerative disc disease (722 6) (M53 9)   13  Noncompliance of patient with other medical treatment and regimen (V15 81) (Z91 19)   14   Onychomycosis (110 1) (B35 1)   15  Poorly controlled type 2 diabetes mellitus (250 00) (E11 65)   16  Preoperative clearance (V72 84) (Z01 818)   17  Primary localized osteoarthritis of both knees (715 16) (M17 0)   18  Recurrent falls (V15 88) (R29 6)   19  Risk for falls (V15 88) (Z91 81)   20  Sciatica of left side (724 3) (M54 32)   21  Screening for depression (V79 0) (Z13 89)   22  Screening for diabetes mellitus (V77 1) (Z13 1)   23  Screening for osteoporosis (V82 81) (Z13 820)   24  Shortness of breath (786 05) (R06 02)   25  Tear of left supraspinatus tendon, subsequent encounter (V58 89,840 6) (S46 812D)   26  Transition of care   27  Visit for screening mammogram (V76 12) (Z12 31)    Acquired ankle/foot deformity (736 70) (M21 969)       Atherosclerotic peripheral vascular disease (440 20) (I70 209)       Diabetes mellitus with polyneuropathy (250 60) (E11 42)       Pain in both feet (729 5) (M79 671)       Callus (700) (L84)        Hyperlipidemia (272 4) (E78 5)       Chest pain (786 50) (R07 9)          Past Medical History    1  History of Abdominal pain, epigastric (789 06) (R10 13)   2  History of Actinomycotic sepsis (038 8,995 91) (A42 7)   3  History of Acute upper respiratory infection (465 9) (J06 9)   4  History of Acute URI (465 9) (J06 9)   5  History of Anxiety (300 00) (F41 9)   6  History of Bee sting, accidental or unintentional, initial encounter (989 5,E905 3)   (T63 441A)   7  History of BMI 34 0-34 9,adult (V85 34) (Z68 34)   8  History of Diabetes Mellitus (250 00)   9  History of Encounter for routine gynecological examination (V72 31) (Z01 419)   10  History of Encounter for special screening examination for genitourinary disorder (V81 6)    (Z13 89)   11  History of acute bronchitis (V12 69) (Z87 09)   12  History of dermatitis (V13 3) (Z87 2)   13  History of essential hypertension (V12 59) (Z86 79)   14  History of hair or hair follicle disorder (Z22 9) (Z87 728)   15   History of herpes zoster (V12 09) (Z86 19)   16  History of hyperlipidemia (V12 29) (Z86 39)   17  History of hypertension (V12 59) (Z86 79)   18  History of insomnia (V13 89) (Z87 898)   19  History of low back pain (V13 59) (Z87 39)   20  History of non-ST elevation myocardial infarction (NSTEMI) (412) (I25 2)   21  History of urinary frequency (V13 09) (Z87 898)   22  History of Impacted cerumen, unspecified laterality (380 4) (H61 20)   23  History of Infective otitis externa, unspecified laterality (380 10) (H60 399)   24  History of Knee contusion (924 11) (S80 00XA)   25  History of Meralgia paresthetica (355 1) (G57 10)   26  History of Meralgia paresthetica (355 1) (G57 10)   27  History of Noncompliance of patient with other medical treatment and regimen (V15 81)    (Z91 19)   28  History of Noncompliance of patient with other medical treatment and regimen (V15 81)    (Z91 19)   29  History of Nonvenomous Insect Bite Of Forearm (913 4)   30  History of Rib injury   31  History of Screening for heart disease (V81 2) (Z13 6)   32  History of Skin rash (782 1) (R21)   33  History of Sprained Ribs (848 3)   34  History of Trigeminal neuralgia (350 1) (G50 0)   35  History of UTI (lower urinary tract infection) (599 0) (N39 0)   36  History of Visit for pre-operative examination (V72 84) (Z01 818)   37  History of Xerosis cutis (706 8) (L85 3)    Surgical History    1  History of Cath Stent Placement  Surgical History Reviewed: The surgical history was reviewed and updated today  Family History  Mother    1  No significant family history  Father    2  No significant family history  Family History Reviewed: The family history was reviewed and updated today  Social History    · Denied: History of Alcohol Use (History)   · Denied: History of Drug Use   · Never A Smoker  Social History Reviewed: The social history was reviewed and updated today  Current Meds   1  Lumigan 0 01 % Ophthalmic Solution;    Therapy: (Skylar Redprecious) to Recorded   2  MetFORMIN HCl - 850 MG Oral Tablet; take 1 tablet by mouth twice a day; Therapy: 82OVZ1121 to [phone])  Requested for: 79MCA3149; Last   Rx:47Bdz1791 Ordered   3  Metoprolol Succinate ER 50 MG Oral Tablet Extended Release 24 Hour; take 1 tablet by   mouth once daily; Therapy: 09DWS3385 to (Evaluate:28Tip3915)  Requested for: 2016; Last   Rx:2016 Ordered   4  Omega-3 1000 MG Oral Capsule; TAKE CAPSULE; Therapy: ([id number]) to Recorded   5  Oxycodone-Acetaminophen  MG Oral Tablet; TAKE 1 TABLET EVERY 6 HOURS AS   NEEDED; Last Rx:72Iew3462 Ordered   6  Pazeo 0 7 % Ophthalmic Solution; Therapy: (Skylar Redo) to Recorded   7  PredniSONE 50 MG Oral Tablet; TAKE 1 TABLET at 7 PM tonight, 1 Tablet at 1 AM and 1   tablet at 7 AM;   Therapy: 20BFF1643 to (Evaluate:2017)  Requested for: 59XRL9471; Last   Rx:2017 Ordered   8  ProAir  (90 Base) MCG/ACT Inhalation Aerosol Solution; INHALE 1 TO 2 PUFFS   EVERY 6 HOURS AS NEEDED; Therapy: 38TQA9785 to (Last Rx:2017)  Requested for: 88FHB5176 Ordered   9  Rosuvastatin Calcium 20 MG Oral Tablet; TAKE 1 TABLET DAILY  Requested for:   2016; Last Rx:2016 Ordered  Medication List Reviewed: The medication list was reviewed and updated today  Allergies    1  Combigan SOLN    2  IVP Dye   3  No Known Latex Allergies    Physical Exam    Constitutional   General appearance: No acute distress, well appearing and well nourished  Eyes   Conjunctiva and lids: No swelling, erythema or discharge  Ears, Nose, Mouth, and Throat   External inspection of ears and nose: Normal     Pulmonary   Respiratory effort: No increased work of breathing or signs of respiratory distress  Auscultation of lungs: Clear to auscultation  Cardiovascular   Auscultation of heart: Normal rate and rhythm, normal S1 and S2, without murmurs      Examination of extremities for edema and/or varicosities: Normal     Abdomen   Abdomen: Non-tender, no masses  Liver and spleen: No hepatomegaly or splenomegaly  Musculoskeletal   Gait and station: Normal     Psychiatric   Mood and affect: Normal          Health Management  Screening for diabetes mellitus   *VB - Eye Exam; every 1 year; Last 18Egv3970; Next Due: 60Mmg1132; Active  Visit for screening mammogram   Digital Bilateral Screening Mammogram With CAD; every 1 year; Last 43UUO8874; Next Due:  24LIZ9654; Overdue    Attending Note  Attending Note: Attending Note: I discussed the case with the Resident and reviewed the Resident's note, I supervised the Resident and I agree with the Resident management plan as it was presented to me  Level of Participation: I was present in clinic and examined the patient  I agree with the Resident's note  Message   Recorded as Task   Date: 10/21/2017 06:11 PM, Created By: System   Task Name: Baldomero Enriquez   Assigned To: Sandie Connor   Regarding Patient: Char Calle, Status:  In Progress   Comment:    System - 21 Oct 2017 6:11 PM     Patient discharged from hospital   Patient Name: Lizette Bagley  Patient YOB: 1940  Discharge Date: 10/21/2017  [facility] - 23 Oct 2017 8:17 AM     TASK EDITED   Nay Everett - 23 Oct 2017 12:17 PM     TASK IN PROGRESS     Signatures   Electronically signed by : Linda Gentile DO; Nov 5 2017  9:38PM EST                       (Author)    Electronically signed by : DIEGO Prabhakar ; Nov 6 2017  3:02PM EST                       (Author)

## 2018-01-12 VITALS
WEIGHT: 196 LBS | SYSTOLIC BLOOD PRESSURE: 130 MMHG | OXYGEN SATURATION: 98 % | DIASTOLIC BLOOD PRESSURE: 60 MMHG | RESPIRATION RATE: 18 BRPM | BODY MASS INDEX: 36.07 KG/M2 | HEIGHT: 62 IN | HEART RATE: 84 BPM

## 2018-01-12 VITALS
HEIGHT: 62 IN | RESPIRATION RATE: 18 BRPM | WEIGHT: 195 LBS | OXYGEN SATURATION: 97 % | SYSTOLIC BLOOD PRESSURE: 130 MMHG | BODY MASS INDEX: 35.88 KG/M2 | TEMPERATURE: 97.3 F | DIASTOLIC BLOOD PRESSURE: 80 MMHG | HEART RATE: 66 BPM

## 2018-01-13 VITALS
WEIGHT: 183 LBS | RESPIRATION RATE: 18 BRPM | OXYGEN SATURATION: 98 % | SYSTOLIC BLOOD PRESSURE: 170 MMHG | TEMPERATURE: 98.3 F | HEART RATE: 89 BPM | BODY MASS INDEX: 33.47 KG/M2 | DIASTOLIC BLOOD PRESSURE: 60 MMHG

## 2018-01-13 VITALS
DIASTOLIC BLOOD PRESSURE: 83 MMHG | HEIGHT: 62 IN | WEIGHT: 193 LBS | SYSTOLIC BLOOD PRESSURE: 142 MMHG | BODY MASS INDEX: 35.51 KG/M2

## 2018-01-13 VITALS — WEIGHT: 197 LBS | HEIGHT: 62 IN | BODY MASS INDEX: 36.25 KG/M2

## 2018-01-13 VITALS
HEART RATE: 64 BPM | WEIGHT: 193 LBS | TEMPERATURE: 97.2 F | DIASTOLIC BLOOD PRESSURE: 84 MMHG | SYSTOLIC BLOOD PRESSURE: 144 MMHG | HEIGHT: 62 IN | OXYGEN SATURATION: 98 % | BODY MASS INDEX: 35.51 KG/M2 | RESPIRATION RATE: 18 BRPM

## 2018-01-13 NOTE — MISCELLANEOUS
Assessment    1  Transition of care   2  Chest pain (786 50) (R07 9)    Chest pain in the hospital was determined to be of noncardiac origin     Plan  Fibromyalgia    · Oxycodone-Acetaminophen  MG Oral Tablet (Percocet); TAKE 1 TABLET  EVERY 6 HOURS AS NEEDED   Rx By: Benja Villafuerte; Dispense: 30 Days ; #:120 Tablet; Refill: 0; For: Fibromyalgia; ARMANDO = N; Print Rx  PMH: Anxiety    · LORazepam 0 5 MG Oral Tablet (Ativan); take 1 pill   Rx By: Benja Villafuerte; Dispense: 0 Days ; #:2 Tablet; Refill: 0; For: PMH: Anxiety; ARMANDO = N; Print Rx; Msg to Pharmacy: take 1 2 hours before prcedure    She did not get the MRIs that I had previously given her she does need sedation before them she does have a ride     Chief Complaint  Chief Complaint Free Text Note Form: patient here for hospital f/u  needs refills also      History of Present Illness  TCM Communication [facility]: The patient is being contacted for follow-up after hospitalization  Hospital records were reviewed  She was hospitalized at [facility] and [address] [place] [address]  The date of admission: 6/26/17, date of discharge: 6/27/17  Diagnosis: Fall with knee injury and right shoulder and chest pain  She was discharged to home  Medications reviewed and updated today  She scheduled a follow up appointment  Follow-up appointments with other specialists: Skyla Ayala / Dr Ted Gray  Symptoms: leg pain right side  leg weakness/ difficultly ambulating Counseling was provided to the patient  Topics counseled included instructions for management and activities of daily living  Communication performed and completed by STARLA Marrero LPN   HPI: Patient is here for FANTA DO following an admission for chest pain which was ruled out as far as a cardiac source  She has had a history of multiple falls over the past year I did refer her to the balance center at one time  She has also been referred or so multiple times and has not followed up with recommendations  She was seen by the spine center yesterday and will begin physical therapy and other treatments      Review of Systems  Complete-Female:   Constitutional: No fever, no chills, feels well, no tiredness, no recent weight gain or weight loss  Eyes: No complaints of eye pain, no red eyes, no eyesight problems, no discharge, no dry eyes, no itching of eyes  ENT: no complaints of earache, no loss of hearing, no nose bleeds, no nasal discharge, no sore throat, no hoarseness  Cardiovascular: No complaints of slow heart rate, no fast heart rate, no chest pain, no palpitations, no leg claudication, no lower extremity edema  Respiratory: No complaints of shortness of breath, no wheezing, no cough, no SOB on exertion, no orthopnea, no PND  Active Problems    1  Acquired ankle/foot deformity (736 70) (M21 969)   2  Acute pain of left shoulder (719 41) (M25 512)   3  Anterior tibialis tendinitis of left lower extremity (726 72) (M76 812)   4  Arthralgia of left foot (719 47) (M25 572)   5  Atherosclerotic peripheral vascular disease (440 20) (I70 209)   6  BMI 33 0-33 9,adult (V85 33) (Z68 33)   7  Callus (700) (L84)   8  Cataracts, both eyes (366 9) (H26 9)   9  Cervicalgia (723 1) (M54 2)   10  Change in bowel habits (787 99) (R19 4)   11  Diabetes mellitus with polyneuropathy (250 60,357 2) (E11 42)   12  Fibromyalgia (729 1) (M79 7)   13  Glaucoma (365 9) (H40 9)   14  Hyperlipidemia (272 4) (E78 5)   15  Leg pain, right (729 5) (M79 604)   16  Noncompliance of patient with other medical treatment and regimen (V15 81) (Z91 19)   17  Onychomycosis (110 1) (B35 1)   18  Pain in both feet (729 5) (M79 671,M79 672)   19  Poorly controlled type 2 diabetes mellitus (250 00) (E11 65)   20  Risk for falls (V15 88) (Z91 81)   21  Sciatica of left side (724 3) (M54 32)   22  Screening for depression (V79 0) (Z13 89)   23  Screening for diabetes mellitus (V77 1) (Z13 1)   24  Visit for screening mammogram (V76 12) (Z12 31)    Past Medical History    1  History of Abdominal pain, epigastric (789 06) (R10 13)   2  History of Actinomycotic sepsis (038 8,995 91) (A42 7)   3  History of Acute upper respiratory infection (465 9) (J06 9)   4  History of Acute URI (465 9) (J06 9)   5  History of Anxiety (300 00) (F41 9)   6  History of Bee sting, accidental or unintentional, initial encounter (989 5,E905 3)   (T63 441A)   7  History of BMI 34 0-34 9,adult (V85 34) (Z68 34)   8  History of Diabetes Mellitus (250 00)   9  History of Encounter for routine gynecological examination (V72 31) (Z01 419)   10  History of Encounter for special screening examination for genitourinary disorder (V81 6)    (Z13 89)   11  History of acute bronchitis (V12 69) (Z87 09)   12  History of dermatitis (V13 3) (Z87 2)   13  History of essential hypertension (V12 59) (Z86 79)   14  History of hair or hair follicle disorder (J70 3) (Z87 898)   15  History of herpes zoster (V12 09) (Z86 19)   16  History of hyperlipidemia (V12 29) (Z86 39)   17  History of hypertension (V12 59) (Z86 79)   18  History of insomnia (V13 89) (Z87 898)   19  History of low back pain (V13 59) (Z87 39)   20  History of urinary frequency (V13 09) (Z87 898)   21  History of Impacted cerumen, unspecified laterality (380 4) (H61 20)   22  History of Infective otitis externa, unspecified laterality (380 10) (H60 399)   23  History of Knee contusion (924 11) (S80 00XA)   24  History of Meralgia paresthetica (355 1) (G57 10)   25  History of Meralgia paresthetica (355 1) (G57 10)   26  History of Noncompliance of patient with other medical treatment and regimen (V15 81)    (Z91 19)   27  History of Noncompliance of patient with other medical treatment and regimen (V15 81)    (Z91 19)   28  History of Nonvenomous Insect Bite Of Forearm (913 4)   29  History of Rib injury   30  History of Screening for heart disease (V81 2) (Z13 6)   31  History of Skin rash (782 1) (R21)   32  History of Sprained Ribs (848 3)   33   History of Trigeminal neuralgia (350 1) (G50 0)   34  History of UTI (lower urinary tract infection) (599 0) (N39 0)   35  History of Visit for pre-operative examination (V72 84) (Z01 818)   36  History of Xerosis cutis (706 8) (L85 3)    Family History  Mother    1  No significant family history  Father    2  No significant family history    Social History    · Denied: History of Alcohol Use (History)   · Denied: History of Drug Use   · Never A Smoker    Current Meds   1  LORazepam 0 5 MG Oral Tablet; take 1 pill; Therapy: 44Sdi9351 to (Last Rx:22Dec2016) Ordered   2  Lumigan 0 01 % Ophthalmic Solution; Therapy: (Pari Cleveland) to Recorded   3  Lyrica 75 MG Oral Capsule; TAKE 1 CAPSULE TWICE DAILY; Therapy: 25NGJ9620 to (Evaluate:98Rmd4275); Last Rx:02Jun2017 Ordered   4  MetFORMIN HCl - 850 MG Oral Tablet; TAKE 1 TABLET BY MOUTH TWICE A DAY WITH A   MEAL; Therapy: 46DMU2146 to (Evaluate:18Oct2016)  Requested for: 20Jun2016; Last   Rx:20Jun2016 Ordered   5  Metoprolol Succinate ER 50 MG Oral Tablet Extended Release 24 Hour; take 1 tablet by   mouth once daily; Therapy: 50AWO3244 to (Evaluate:16Apr2017)  Requested for: 20Jun2016; Last   Rx:20Jun2016 Ordered   6  Oxycodone-Acetaminophen  MG Oral Tablet; TAKE 1 TABLET EVERY 6 HOURS AS   NEEDED; Last Rx:24Ojz0390 Ordered   7  Pazeo 0 7 % Ophthalmic Solution; Therapy: (Pari Cleveland) to Recorded   8  ProAir  (90 Base) MCG/ACT Inhalation Aerosol Solution; INHALE 1 TO 2 PUFFS   EVERY 6 HOURS AS NEEDED; Therapy: 40NCW4984 to (Last Rx:30Mar2017)  Requested for: 60FQI0196 Ordered   9  Rosuvastatin Calcium 20 MG Oral Tablet; TAKE 1 TABLET DAILY  Requested for:   20Jun2016; Last Rx:20Jun2016 Ordered    Allergies    1  Combigan SOLN    2  IVP Dye   3   No Known Latex Allergies    Vitals  Signs   Recorded: 20UUC1827 02:07PM   Temperature: 97 4 F  Heart Rate: 76  Respiration: 18  Systolic: 501  Diastolic: 74  Height: 5 ft 2 in  Weight: 190 lb 9 oz  BMI Calculated: 34 85  BSA Calculated: 1 87  O2 Saturation: 95  Pain Scale: 0    Physical Exam    Constitutional   General appearance: No acute distress, well appearing and well nourished  Pulmonary   Auscultation of lungs: Clear to auscultation  Cardiovascular   Auscultation of heart: Normal rate and rhythm, normal S1 and S2, without murmurs  Abdomen   Abdomen: Non-tender, no masses  Health Management  Screening for diabetes mellitus   *VB - Eye Exam; every 1 year; Last 40Eoq6630; Next Due: 45Agw4031; Active  Visit for screening mammogram   Digital Bilateral Screening Mammogram With CAD; every 1 year; Last 01AGB7648; Next Due:  14BPG7262; Overdue    Future Appointments    Date/Time Provider Specialty Site   07/26/2017 01:45 PM Mary Miguel DPM Podiatry [address] [facility]   08/17/2017 01:00 PM LISA Daily  Sports Medicine [facility] Shoulders ORTHO [address]   09/14/2017 05:15 PM LISA Zacarias  Family Medicine [facility]     Signatures   Electronically signed by :  LISA Monroy ; Jul 10 2017  4:14PM EST                       (Author)

## 2018-01-14 VITALS
BODY MASS INDEX: 34.96 KG/M2 | HEIGHT: 62 IN | SYSTOLIC BLOOD PRESSURE: 130 MMHG | WEIGHT: 190 LBS | DIASTOLIC BLOOD PRESSURE: 78 MMHG

## 2018-01-14 VITALS
RESPIRATION RATE: 16 BRPM | BODY MASS INDEX: 34.96 KG/M2 | TEMPERATURE: 98.5 F | WEIGHT: 190 LBS | SYSTOLIC BLOOD PRESSURE: 128 MMHG | HEART RATE: 76 BPM | HEIGHT: 62 IN | DIASTOLIC BLOOD PRESSURE: 74 MMHG

## 2018-01-14 VITALS
BODY MASS INDEX: 36.38 KG/M2 | HEIGHT: 62 IN | DIASTOLIC BLOOD PRESSURE: 76 MMHG | SYSTOLIC BLOOD PRESSURE: 130 MMHG | OXYGEN SATURATION: 98 % | HEART RATE: 59 BPM | WEIGHT: 197.7 LBS

## 2018-01-18 NOTE — PROGRESS NOTES
Assessment    1  CAD (coronary artery disease) (414 00) (I25 10)   2  Hospital discharge follow-up (V67 59) (Z09)    Plan  PMH: History of essential hypertension    · Metoprolol Succinate ER 50 MG Oral Tablet Extended Release 24 Hour; take 1  tablet by mouth once daily    Chief Complaint  f/u Heart      Active Problems    1  Acquired ankle/foot deformity (736 70) (M21 969)   2  Acute pain of left shoulder (719 41) (M25 512)   3  Anterior tibialis tendinitis of left lower extremity (726 72) (M76 812)   4  Arthralgia of left foot (719 47) (M25 572)   5  Atherosclerotic peripheral vascular disease (440 20) (I70 209)   6  BMI 33 0-33 9,adult (V85 33) (Z68 33)   7  CAD (coronary artery disease) (414 00) (I25 10)   8  Callus (700) (L84)   9  Cataracts, both eyes (366 9) (H26 9)   10  Cervicalgia (723 1) (M54 2)   11  Change in bowel habits (787 99) (R19 4)   12  Chest pain (786 50) (R07 9)   13  Diabetes mellitus with polyneuropathy (250 60,357 2) (E11 42)   14  Fibromyalgia (729 1) (M79 7)   15  Glaucoma (365 9) (H40 9)   16  Hyperlipidemia (272 4) (E78 5)   17  Knee pain, right (719 46) (M25 561)   18  Leg pain, right (729 5) (M79 604)   19  Multilevel degenerative disc disease (722 6) (M53 9)   20  Noncompliance of patient with other medical treatment and regimen (V15 81) (Z91 19)   21  Onychomycosis (110 1) (B35 1)   22  Pain in both feet (729 5) (M79 671,M79 672)   23  Poorly controlled type 2 diabetes mellitus (250 00) (E11 65)   24  Preoperative clearance (V72 84) (Z01 818)   25  Primary localized osteoarthritis of both knees (715 16) (M17 0)   26  Recurrent falls (V15 88) (R29 6)   27  Risk for falls (V15 88) (Z91 81)   28  Sciatica of left side (724 3) (M54 32)   29  Screening for depression (V79 0) (Z13 89)   30  Screening for diabetes mellitus (V77 1) (Z13 1)   31  Screening for osteoporosis (V82 81) (Z13 820)   32  Shortness of breath (786 05) (R06 02)   33   Tear of left supraspinatus tendon, subsequent encounter (V58 89,840 6) (S46 812D)   34  Transition of care   35  Visit for screening mammogram (V76 12) (Z12 31)    Past Medical History    1  History of Abdominal pain, epigastric (789 06) (R10 13)   2  History of Actinomycotic sepsis (038 8,995 91) (A42 7)   3  History of Acute upper respiratory infection (465 9) (J06 9)   4  History of Acute URI (465 9) (J06 9)   5  History of Anxiety (300 00) (F41 9)   6  History of Bee sting, accidental or unintentional, initial encounter (989 5,E905 3)   (T63 441A)   7  History of BMI 34 0-34 9,adult (V85 34) (Z68 34)   8  History of Diabetes Mellitus (250 00)   9  History of Encounter for routine gynecological examination (V72 31) (Z01 419)   10  History of Encounter for special screening examination for genitourinary disorder (V81 6)    (Z13 89)   11  History of acute bronchitis (V12 69) (Z87 09)   12  History of dermatitis (V13 3) (Z87 2)   13  History of essential hypertension (V12 59) (Z86 79)   14  History of hair or hair follicle disorder (T06 5) (Z87 898)   15  History of herpes zoster (V12 09) (Z86 19)   16  History of hyperlipidemia (V12 29) (Z86 39)   17  History of hypertension (V12 59) (Z86 79)   18  History of insomnia (V13 89) (Z87 898)   19  History of low back pain (V13 59) (Z87 39)   20  History of urinary frequency (V13 09) (Z87 898)   21  History of Impacted cerumen, unspecified laterality (380 4) (H61 20)   22  History of Infective otitis externa, unspecified laterality (380 10) (H60 399)   23  History of Knee contusion (924 11) (S80 00XA)   24  History of Meralgia paresthetica (355 1) (G57 10)   25  History of Meralgia paresthetica (355 1) (G57 10)   26  History of Noncompliance of patient with other medical treatment and regimen (V15 81)    (Z91 19)   27  History of Noncompliance of patient with other medical treatment and regimen (V15 81)    (Z91 19)   28  History of Nonvenomous Insect Bite Of Forearm (913 4)   29  History of Rib injury   30   History of Screening for heart disease (V81 2) (Z13 6)   31  History of Skin rash (782 1) (R21)   32  History of Sprained Ribs (848 3)   33  History of Trigeminal neuralgia (350 1) (G50 0)   34  History of UTI (lower urinary tract infection) (599 0) (N39 0)   35  History of Visit for pre-operative examination (V72 84) (Z01 818)   36  History of Xerosis cutis (706 8) (L85 3)    Family History  Mother    1  No significant family history  Father    2  No significant family history    Social History    · Denied: History of Alcohol Use (History)   · Denied: History of Drug Use   · Never A Smoker   · Non-smoker (V49 89) (Z78 9)    Current Meds   1  Aspirin 81 MG Oral Tablet Chewable; CHEW AND SWALLOW 1 TABLET DAILY; Therapy: (Recorded:30Oct2017) to Recorded   2  Clopidogrel Bisulfate 75 MG Oral Tablet; TAKE 1 TABLET DAILY; Therapy: (Recorded:30Oct2017) to Recorded   3  Lumigan 0 01 % Ophthalmic Solution; Therapy: (Ever Sales) to Recorded   4  MetFORMIN HCl - 850 MG Oral Tablet; take 1 tablet by mouth twice a day; Therapy: 78NQD0472 to [phone])  Requested for: 06ERI1871; Last   Rx:12Kyg3032 Ordered   5  Metoprolol Succinate ER 50 MG Oral Tablet Extended Release 24 Hour; take 1 tablet by   mouth once daily; Therapy: 99HZF6633 to (Evaluate:16Apr2017)  Requested for: 20Jun2016; Last   Rx:20Jun2016 Ordered   6  Omega-3 1000 MG Oral Capsule; TAKE CAPSULE; Therapy: (KCTRRMQY:46PTF9773) to Recorded   7  Oxycodone-Acetaminophen  MG Oral Tablet; TAKE 1 TABLET EVERY 6 HOURS AS   NEEDED; Last Rx:44Smp8576 Ordered   8  Pravastatin Sodium 40 MG Oral Tablet; TAKE 1 TABLET AT BEDTIME; Therapy: (Recorded:30Oct2017) to Recorded   9  PreserVision AREDS Oral Capsule; Therapy: (Recorded:30Oct2017) to Recorded   10  ProAir  (90 Base) MCG/ACT Inhalation Aerosol Solution; INHALE 1 TO 2 PUFFS    EVERY 6 HOURS AS NEEDED; Therapy: 82XDT2257 to (Last Rx:30Mar2017)  Requested for: 79UST6617 Ordered   11  Timolol Maleate 0 25 % Ophthalmic Solution; Therapy: (Recorded:30Oct2017) to Recorded    Allergies    1  Aspir-81 TBEC   2  Combigan SOLN   3  Fish Oil CAPS   4  Plavix TABS   5  Pravastatin Sodium TABS   6  Vitamin C TABS    7  IVP Dye   8  No Known Latex Allergies    Health Management  Screening for diabetes mellitus   *VB - Eye Exam; every 1 year; Last 22Fnw8865; Next Due: 80Msm1717; Active  Visit for screening mammogram   Digital Bilateral Screening Mammogram With CAD; every 1 year; Last 89MZQ9693; Next Due:  10BIB9145;  Overdue    Signatures   Electronically signed by : Varun Martin MD; Nov  3 2017  5:32PM EST                       (Author)

## 2018-01-22 VITALS
HEART RATE: 76 BPM | WEIGHT: 197 LBS | SYSTOLIC BLOOD PRESSURE: 140 MMHG | DIASTOLIC BLOOD PRESSURE: 60 MMHG | HEIGHT: 62 IN | OXYGEN SATURATION: 96 % | BODY MASS INDEX: 36.25 KG/M2

## 2018-01-22 VITALS
RESPIRATION RATE: 18 BRPM | SYSTOLIC BLOOD PRESSURE: 136 MMHG | DIASTOLIC BLOOD PRESSURE: 80 MMHG | OXYGEN SATURATION: 95 % | HEIGHT: 62 IN | HEART RATE: 66 BPM | BODY MASS INDEX: 36.44 KG/M2 | WEIGHT: 198 LBS

## 2018-01-22 VITALS
DIASTOLIC BLOOD PRESSURE: 74 MMHG | SYSTOLIC BLOOD PRESSURE: 130 MMHG | BODY MASS INDEX: 35.07 KG/M2 | RESPIRATION RATE: 18 BRPM | OXYGEN SATURATION: 95 % | HEART RATE: 76 BPM | HEIGHT: 62 IN | TEMPERATURE: 97.4 F | WEIGHT: 190.56 LBS

## 2018-01-31 ENCOUNTER — OFFICE VISIT (OUTPATIENT)
Dept: CARDIOLOGY CLINIC | Facility: CLINIC | Age: 78
End: 2018-01-31

## 2018-02-27 ENCOUNTER — TELEPHONE (OUTPATIENT)
Dept: CARDIOLOGY CLINIC | Facility: CLINIC | Age: 78
End: 2018-02-27

## 2018-02-27 NOTE — TELEPHONE ENCOUNTER
Can you please let her know she may NOT stop her Plavix until minimum 6 months after stent was placed  That would put her into mid April    Thanks

## 2018-03-05 ENCOUNTER — ANESTHESIA EVENT (OUTPATIENT)
Dept: GASTROENTEROLOGY | Facility: AMBULARY SURGERY CENTER | Age: 78
End: 2018-03-05
Payer: MEDICARE

## 2018-03-05 NOTE — ANESTHESIA PREPROCEDURE EVALUATION
Review of Systems/Medical History  Patient summary reviewed  Chart reviewed  No history of anesthetic complications     Cardiovascular  Hyperlipidemia, Hypertension , Past MI (2017) , CAD, , Cardiac stents  History of percutaneous transluminal coronary angioplasty,    Pulmonary       GI/Hepatic      Comment: Irritable bowel; s/p leon; s/p appy          Endo/Other  Diabetes ,   Obesity    GYN    Hysterectomy,        Hematology    Coagulation disorder (plavix stopped on 1st) currently taking oral anticoagulants,    Musculoskeletal  Back pain , lumbar pain,   Arthritis     Neurology    Neuromuscular disease (fibromyalgia; neuropathy both feet - frequent falls) ,   Comment: Glaucoma; macular degeneration both eyes Psychology           Physical Exam    Airway    Mallampati score: II  TM Distance: >3 FB  Neck ROM: full     Dental       Cardiovascular  Rhythm: regular, Rate: normal,     Pulmonary  Breath sounds clear to auscultation,     Other Findings        Anesthesia Plan  ASA Score- 3     Anesthesia Type- IV sedation with anesthesia with ASA Monitors  Additional Monitors:   Airway Plan:         Plan Factors-    Induction- intravenous  Postoperative Plan-     Informed Consent- Anesthetic plan and risks discussed with patient  I personally reviewed this patient with the CRNA  Discussed and agreed on the Anesthesia Plan with the AREN Martin

## 2018-03-06 ENCOUNTER — HOSPITAL ENCOUNTER (OUTPATIENT)
Facility: AMBULARY SURGERY CENTER | Age: 78
Setting detail: OUTPATIENT SURGERY
Discharge: HOME | End: 2018-03-06
Attending: INTERNAL MEDICINE | Admitting: INTERNAL MEDICINE
Payer: MEDICARE

## 2018-03-06 ENCOUNTER — ANESTHESIA (OUTPATIENT)
Dept: GASTROENTEROLOGY | Facility: AMBULARY SURGERY CENTER | Age: 78
End: 2018-03-06
Payer: MEDICARE

## 2018-03-06 VITALS
HEART RATE: 62 BPM | DIASTOLIC BLOOD PRESSURE: 67 MMHG | OXYGEN SATURATION: 96 % | TEMPERATURE: 98.6 F | SYSTOLIC BLOOD PRESSURE: 131 MMHG | RESPIRATION RATE: 18 BRPM

## 2018-03-06 DIAGNOSIS — Q45.3 OTHER CONGENITAL MALFORMATIONS OF PANCREAS AND PANCREATIC DUCT: ICD-10-CM

## 2018-03-06 DIAGNOSIS — G89.4 CHRONIC PAIN SYNDROME: Primary | ICD-10-CM

## 2018-03-06 DIAGNOSIS — R10.13 EPIGASTRIC PAIN: ICD-10-CM

## 2018-03-06 LAB — GLUCOSE SERPL-MCNC: 128 MG/DL (ref 65–140)

## 2018-03-06 PROCEDURE — 88342 IMHCHEM/IMCYTCHM 1ST ANTB: CPT | Performed by: INTERNAL MEDICINE

## 2018-03-06 PROCEDURE — 82948 REAGENT STRIP/BLOOD GLUCOSE: CPT

## 2018-03-06 PROCEDURE — 88305 TISSUE EXAM BY PATHOLOGIST: CPT | Performed by: INTERNAL MEDICINE

## 2018-03-06 PROCEDURE — 88313 SPECIAL STAINS GROUP 2: CPT | Performed by: INTERNAL MEDICINE

## 2018-03-06 RX ORDER — LIDOCAINE HYDROCHLORIDE 10 MG/ML
INJECTION, SOLUTION INFILTRATION; PERINEURAL AS NEEDED
Status: DISCONTINUED | OUTPATIENT
Start: 2018-03-06 | End: 2018-03-06 | Stop reason: SURG

## 2018-03-06 RX ORDER — SODIUM CHLORIDE 9 MG/ML
75 INJECTION, SOLUTION INTRAVENOUS CONTINUOUS
Status: DISCONTINUED | OUTPATIENT
Start: 2018-03-06 | End: 2018-03-06 | Stop reason: HOSPADM

## 2018-03-06 RX ORDER — PROPOFOL 10 MG/ML
INJECTION, EMULSION INTRAVENOUS AS NEEDED
Status: DISCONTINUED | OUTPATIENT
Start: 2018-03-06 | End: 2018-03-06 | Stop reason: SURG

## 2018-03-06 RX ADMIN — SODIUM CHLORIDE 75 ML/HR: 0.9 INJECTION, SOLUTION INTRAVENOUS at 07:43

## 2018-03-06 RX ADMIN — PROPOFOL 50 MG: 10 INJECTION, EMULSION INTRAVENOUS at 08:20

## 2018-03-06 RX ADMIN — PROPOFOL 100 MG: 10 INJECTION, EMULSION INTRAVENOUS at 08:19

## 2018-03-06 RX ADMIN — PROPOFOL 30 MG: 10 INJECTION, EMULSION INTRAVENOUS at 08:27

## 2018-03-06 RX ADMIN — PROPOFOL 30 MG: 10 INJECTION, EMULSION INTRAVENOUS at 08:23

## 2018-03-06 RX ADMIN — SODIUM CHLORIDE: 0.9 INJECTION, SOLUTION INTRAVENOUS at 08:17

## 2018-03-06 RX ADMIN — LIDOCAINE HYDROCHLORIDE 50 MG: 10 INJECTION, SOLUTION INFILTRATION; PERINEURAL at 08:19

## 2018-03-06 NOTE — OP NOTE
Endoscopic Gastroduodenoscopy Procedure Note    Procedure: Endoscopic Gastroduodenoscopy with biopsy    Pre-operative Diagnosis:  Chronic gastroesophageal reflux disease  History of cyst of duct of Santorini  Post-operative Diagnosis:  Small hiatal hernia and reflux  Mild gastritis  Duodenal cyst appears to be much improved  Indications:  Intermittent epigastric discomfort  Occasional heartburn  Patient has history of reflux esophagitis  She also has history of cyst of duct of Santorini noted during previous endoscopy  Her MRI was unremarkable  Endoscopy is being done for surveillance  Sedation: MAC    Pre-Procedure Physical:  See pre-procedure physical examination notes    BP (!) 212/80   Pulse 76   Temp 98 6 °F (37 °C) (Tympanic)   Resp 18   SpO2 98%         Procedure Details     Informed consent was obtained for the procedure, including conscious sedation  Risks of pancreatitis, infection, perforation, hemorrhage, adverse drug reaction and aspiration were discussed  The patient was placed in the left lateral decubitus position  Based on the pre-procedure assessment, including review of the patient's medical history, medications, allergies, and review of systems, she had been deemed to be an appropriate candidate for conscious sedation; she was therefore sedated with the medications listed below  She was monitored continuously with ECG tracing, pulse oximetry, blood pressure monitoring, and direct observation  The gastroscope was inserted into the mouth and advanced under direct vision to second portion of the duodenum  A careful inspection was made as the gastroscope was withdrawn, including a retroflexed view of the proximal stomach; findings and interventions are described below  Appropriate photodocumentation was obtained  Findings:  1  Esophagus:  Slightly foreshortened  Mild erythema  There is no ulcer or evidence of Miguel's esophagus noted    2   Stomach:  Small sliding hiatal hernia measuring 2 cm noted  There is gastritis in the antrum  Erosions also noted  Biopsies taken  Air distensibility was normal   3   Duodenum:  Mild duodenitis at the bulb  Previously seen cystic area not seen during this examination  Ampullary and periampullary area was evaluated thoroughly  Specimens:  Antrum and lower esophagus           Complications: None  Disposition:  Home           Condition: stable    Attending Attestation: I was present for the entire procedure    Impression:    1  Previously seen cystic area in the duodenum which was suggestive of cyst of duct of center any has pretty much resolved  Mild duodenitis present  2   Small hiatal hernia and reflux  Recommendations:  1  Check biopsy  2   Observe clinical course  3   Further recommendations depending on her clinical course

## 2018-03-06 NOTE — H&P
History and Physical   Gastroenterology  Zakia Tan 65 y o  female MRN: [id number]  Unit/Bed#: [facility] Encounter: [id number]  03/06/18   8:15 AM      No chief complaint on file  History of Present Illness   HPI:  Zakia Tan is a 65 y o  female who presents with history of gastroesophageal reflux disease and epigastric discomfort  Patient also has history of santorenocele which is a cyst at the opening of duct of Santorini of the lesser pancreatic duct  Patient denies any weight loss or weight gain  She has history of diabetes  Historical Information   Past Medical History:   Diagnosis Date    Arthritis     DJD both knees-has fallen on the knees several times    Diabetes mellitus ([address] )     Fall with injury 02/15/2017    fell at home secondary to macular degeneration vision issues, neck pain currently    Fibromyalgia     had for many years  may have polymyalgia    Fibromyalgia, primary     Frequent falls 05/2017    injured left shoulder-recent MRI    Glaucoma     both eyes    Herniated cervical disc     Hyperlipidemia     Hypertension     Irritable bowel syndrome     Liver disease     " fatty liver"    Lumbar herniated disc     with sciatica    Macular degeneration     both eyes, gets injections    Neuropathy     in the feet? falls on occ    Obesity     Pancreas disorder     "slow leak"    Shortness of breath     Urinary incontinence      Past Surgical History:   Procedure Laterality Date    APPENDECTOMY      CATARACT EXTRACTION      CHOLECYSTECTOMY      Lap    COLONOSCOPY      COLONOSCOPY N/A 2/21/2017    Procedure: COLONOSCOPY and biopsy, snare polypectomy;  Surgeon: Leila Simon MD;  Location: [facility] GI LAB; Service:     DILATION AND CURETTAGE, DIAGNOSTIC / THERAPEUTIC      ESOPHAGOGASTRODUODENOSCOPY N/A 2/21/2017    Procedure: ESOPHAGOGASTRODUODENOSCOPY (EGD) and biopsy ;  Surgeon: Leila Simon MD;  Location: [facility] GI LAB;   Service:    Edwin Engle SURGERY      in her 19's    HYSTERECTOMY      complete    OOPHORECTOMY Left     age 21 then hysterectomy    MD REMV CATARACT EXTRACAP,INSERT LENS Right 5/19/2016    Procedure: EXTRACTION EXTRACAPSULAR CATARACT PHACO INTRAOCULAR LENS (IOL); Surgeon: Oswald Girard MD;  Location: [facility] MAIN OR;  Service: Ophthalmology     [address] [address] Left 10/12/2017    Procedure: EXTRACTION EXTRACAPSULAR CATARACT PHACO INTRAOCULAR LENS (IOL);   Surgeon: Oswald Girard MD;  Location: [facility] MAIN OR;  Service: Ophthalmology    TONSILLECTOMY      age 24       Meds/Allergies     Prescriptions Prior to Admission   Medication    ascorbic acid (VITAMIN C) 500 mg tablet    bimatoprost (LUMIGAN) 0 01 % ophthalmic drops    ketorolac (ACULAR) 0 5 % ophthalmic solution    metoprolol succinate (TOPROL-XL) 50 mg 24 hr tablet    Multiple Vitamins-Minerals (PRESERVISION AREDS 2 PO)    Omega-3 Fatty Acids (FISH OIL PO)    oxyCODONE-acetaminophen (PERCOCET)  mg per tablet    pravastatin (PRAVACHOL) 40 mg tablet    prednisoLONE acetate (PRED FORTE) 1 % ophthalmic suspension    timolol (BETIMOL) 0 25 % ophthalmic solution    aspirin 81 mg chewable tablet    clopidogrel (PLAVIX) 75 mg tablet    metFORMIN (GLUCOPHAGE) 850 mg tablet         Current Facility-Administered Medications:     sodium chloride 0 9 % infusion, 75 mL/hr, Intravenous, Continuous, Willa Hughes MD, Last Rate: 75 mL/hr at 03/06/18 0743, 75 mL/hr at 03/06/18 0743    Allergies   Allergen Reactions    Combigan [Brimonidine Tartrate-Timolol] Other (See Comments)     Eyes became red    Iohexol Hives    Other Hives     Cat Scan Dye    Lyrica [Pregabalin]      Became "sick" so she avoids       Social History   History   Alcohol use Not on file     Comment: occ     History   Drug use: Unknown     History   Smoking Status    Never Smoker   Smokeless Tobacco    Never Used       Family History:   Family History   Problem Relation Age of Onset    Coronary artery disease Father     Cancer Father      esophagus, trach    Peripheral vascular disease Father     Heart disease Sister      MI, in her 62s    Cancer Brother      bowel CA exp age 52    Obesity Brother     Heart disease Brother 64     Massive MI    Cancer Maternal Grandmother      bowel CA    Heart disease Mother      CHF       Objective     Current Vitals:   Blood Pressure: (!) 212/80 (03/06/18 0743)  Pulse: 76 (03/06/18 0743)  Temperature: 98 6 °F (37 °C) (03/06/18 0743)  Temp Source: Tympanic (03/06/18 0743)  Respirations: 18 (03/06/18 0743)  SpO2: 98 % (03/06/18 0743)  No intake or output data in the 24 hours ending 03/06/18 0815    Physical Exam:  General:  Alert and oriented  Eyes:  There is no pallor or jaundice  Pulm:  Clear to auscultation and percussion  CV:  Normal S1-S2  Abdomen:  Soft and nontender  Extremities:  No edema    ASSESSMENT:  Katt Ferrer is a 65 y o  female who presents with history of chronic gastroesophageal reflux disease and epigastric pain after eating  The pain is about 3/10         PLAN:  EGD      Leyla Morris MD

## 2018-03-06 NOTE — DISCHARGE INSTRUCTIONS
You did really well  You have a small hiatal hernia and reflux  I did not see any ulcer  I have taken biopsies  The cyst noted in the small intestine is not visible anymore  A repeat endoscopy may be done again in 1 year  See me in office in 4-6 months

## 2018-03-06 NOTE — ANESTHESIA POSTPROCEDURE EVALUATION
Post-Op Assessment Note      CV Status:  Stable    Mental Status:  Alert and awake    Hydration Status:  Euvolemic    PONV Controlled:  Controlled    Airway Patency:  Patent    Post Op Vitals Reviewed: Yes          Staff: Anesthesiologist, CRNA           BP     Temp      Pulse     Resp      SpO2

## 2018-03-12 ENCOUNTER — TELEPHONE (OUTPATIENT)
Dept: CARDIOLOGY CLINIC | Facility: CLINIC | Age: 78
End: 2018-03-12

## 2018-03-12 RX ORDER — OXYCODONE AND ACETAMINOPHEN 10; 325 MG/1; MG/1
1 TABLET ORAL EVERY 6 HOURS PRN
Qty: 90 TABLET | Refills: 0 | Status: SHIPPED | OUTPATIENT
Start: 2018-03-12 | End: 2018-06-13 | Stop reason: SDUPTHER

## 2018-03-12 NOTE — TELEPHONE ENCOUNTER
Left v/m about pt not to stop plavix until mid-April 2018 and to contact the office with any questions

## 2018-03-16 ENCOUNTER — OFFICE VISIT (OUTPATIENT)
Dept: FAMILY MEDICINE CLINIC | Facility: CLINIC | Age: 78
End: 2018-03-16
Payer: MEDICARE

## 2018-03-16 VITALS
WEIGHT: 203 LBS | DIASTOLIC BLOOD PRESSURE: 70 MMHG | RESPIRATION RATE: 18 BRPM | OXYGEN SATURATION: 98 % | HEART RATE: 75 BPM | SYSTOLIC BLOOD PRESSURE: 140 MMHG | BODY MASS INDEX: 37.13 KG/M2

## 2018-03-16 DIAGNOSIS — M79.7 FIBROMYALGIA: ICD-10-CM

## 2018-03-16 DIAGNOSIS — E11.21 TYPE 2 DIABETES MELLITUS WITH DIABETIC NEPHROPATHY, WITHOUT LONG-TERM CURRENT USE OF INSULIN (HCC): Primary | ICD-10-CM

## 2018-03-16 DIAGNOSIS — I10 ESSENTIAL HYPERTENSION: ICD-10-CM

## 2018-03-16 PROCEDURE — 99212 OFFICE O/P EST SF 10 MIN: CPT | Performed by: FAMILY MEDICINE

## 2018-03-16 RX ORDER — AMITRIPTYLINE HYDROCHLORIDE 25 MG/1
25 TABLET, FILM COATED ORAL
Qty: 30 TABLET | Refills: 0 | Status: SHIPPED | OUTPATIENT
Start: 2018-03-16 | End: 2018-06-14 | Stop reason: SDUPTHER

## 2018-03-16 NOTE — PROGRESS NOTES
Assessment/Plan:    No problem-specific Assessment & Plan notes found for this encounter  Diagnoses and all orders for this visit:    Type 2 diabetes mellitus with diabetic nephropathy, without long-term current use of insulin (HCC)    Essential hypertension    Fibromyalgia  -     amitriptyline (ELAVIL) 25 mg tablet; Take 1 tablet (25 mg total) by mouth daily at bedtime          Subjective:      Patient ID: Jose Perea is a 65 y o  female  Patient is here now for any medical reason but to let me know this is her last visit here she is moving down to [facility] within the month to live with her son I did encourage that is initially gets to [facility] she should establish with a new primary care physician  She stated to me that she'll think about it she's not sure what meds she is going to continue and even though she had a recent heart attack she doesn't really care how many heart attacks she has as long she doesn't have a stroke so I did encourage her that it is important to maintain her chronic medical issues to decrease the risk of that    He has been a very pleasant patient over the years but has had issues with compliance and physician recommendations I'm not sure what she'll choose to do when she moves to [address] [place] [address] following portions of the patient's history were reviewed and updated as appropriate: allergies, past social history, past surgical history and problem list     Review of Systems      Objective:      /70   Pulse 75   Resp 18   Wt 92 1 kg (203 lb)   SpO2 98%   BMI 37 13 kg/m²          Physical Exam  none done

## 2018-05-15 DIAGNOSIS — E78.5 DYSLIPIDEMIA: Primary | ICD-10-CM

## 2018-05-16 RX ORDER — PRAVASTATIN SODIUM 40 MG
TABLET ORAL
Qty: 30 TABLET | Refills: 5 | Status: SHIPPED | OUTPATIENT
Start: 2018-05-16 | End: 2018-06-14 | Stop reason: SDUPTHER

## 2018-06-13 ENCOUNTER — OFFICE VISIT (OUTPATIENT)
Dept: FAMILY MEDICINE CLINIC | Facility: CLINIC | Age: 78
End: 2018-06-13
Payer: MEDICARE

## 2018-06-13 VITALS
TEMPERATURE: 98.4 F | WEIGHT: 199.13 LBS | RESPIRATION RATE: 18 BRPM | SYSTOLIC BLOOD PRESSURE: 138 MMHG | OXYGEN SATURATION: 96 % | DIASTOLIC BLOOD PRESSURE: 100 MMHG | HEART RATE: 68 BPM | HEIGHT: 62 IN | BODY MASS INDEX: 36.64 KG/M2

## 2018-06-13 DIAGNOSIS — E11.42 TYPE 2 DIABETES MELLITUS WITH DIABETIC POLYNEUROPATHY, WITHOUT LONG-TERM CURRENT USE OF INSULIN (HCC): ICD-10-CM

## 2018-06-13 DIAGNOSIS — M79.7 FIBROMYALGIA: ICD-10-CM

## 2018-06-13 DIAGNOSIS — H40.9 GLAUCOMA OF BOTH EYES, UNSPECIFIED GLAUCOMA TYPE: Primary | ICD-10-CM

## 2018-06-13 DIAGNOSIS — I25.2 MI, OLD: ICD-10-CM

## 2018-06-13 DIAGNOSIS — G89.4 CHRONIC PAIN SYNDROME: ICD-10-CM

## 2018-06-13 DIAGNOSIS — E78.5 DYSLIPIDEMIA: ICD-10-CM

## 2018-06-13 PROCEDURE — 99213 OFFICE O/P EST LOW 20 MIN: CPT | Performed by: FAMILY MEDICINE

## 2018-06-13 RX ORDER — ALBUTEROL SULFATE 90 UG/1
1-2 AEROSOL, METERED RESPIRATORY (INHALATION) EVERY 6 HOURS PRN
COMMUNITY
Start: 2017-03-30

## 2018-06-13 RX ORDER — TIMOLOL MALEATE 5 MG/ML
SOLUTION/ DROPS OPHTHALMIC
Refills: 0 | COMMUNITY
Start: 2018-04-12 | End: 2018-06-14 | Stop reason: SDUPTHER

## 2018-06-13 RX ORDER — GLIMEPIRIDE 2 MG/1
TABLET ORAL
COMMUNITY
End: 2018-06-13 | Stop reason: DRUGHIGH

## 2018-06-13 RX ORDER — OXYCODONE AND ACETAMINOPHEN 10; 325 MG/1; MG/1
1 TABLET ORAL EVERY 6 HOURS PRN
Qty: 90 TABLET | Refills: 0 | Status: SHIPPED | OUTPATIENT
Start: 2018-06-13

## 2018-06-14 RX ORDER — TIMOLOL MALEATE 5 MG/ML
1 SOLUTION/ DROPS OPHTHALMIC 2 TIMES DAILY
Qty: 10 ML | Refills: 0 | Status: SHIPPED | OUTPATIENT
Start: 2018-06-14

## 2018-06-14 RX ORDER — KETOROLAC TROMETHAMINE 5 MG/ML
1 SOLUTION OPHTHALMIC 2 TIMES DAILY
Qty: 5 ML | Refills: 0 | Status: SHIPPED | OUTPATIENT
Start: 2018-06-14

## 2018-06-14 RX ORDER — PRAVASTATIN SODIUM 40 MG
40 TABLET ORAL
Qty: 30 TABLET | Refills: 0 | Status: SHIPPED | OUTPATIENT
Start: 2018-06-14 | End: 2018-07-24 | Stop reason: SDUPTHER

## 2018-06-14 RX ORDER — ASPIRIN 81 MG/1
81 TABLET, CHEWABLE ORAL DAILY
Qty: 30 TABLET | Refills: 0 | Status: SHIPPED | OUTPATIENT
Start: 2018-06-14

## 2018-06-14 RX ORDER — CLOPIDOGREL BISULFATE 75 MG/1
75 TABLET ORAL DAILY
Qty: 30 TABLET | Refills: 0 | Status: SHIPPED | OUTPATIENT
Start: 2018-06-14

## 2018-06-14 RX ORDER — METOPROLOL SUCCINATE 50 MG/1
50 TABLET, EXTENDED RELEASE ORAL EVERY MORNING
Qty: 60 TABLET | Refills: 0 | Status: SHIPPED | OUTPATIENT
Start: 2018-06-14

## 2018-06-14 RX ORDER — AMITRIPTYLINE HYDROCHLORIDE 25 MG/1
25 TABLET, FILM COATED ORAL
Qty: 30 TABLET | Refills: 0 | Status: SHIPPED | OUTPATIENT
Start: 2018-06-14

## 2018-06-14 NOTE — PROGRESS NOTES
Assessment/Plan:    No problem-specific Assessment & Plan notes found for this encounter  Diagnoses and all orders for this visit:    Chronic pain syndrome  -     oxyCODONE-acetaminophen (PERCOCET)  mg per tablet; Take 1 tablet by mouth every 6 (six) hours as needed for severe pain May take 1/2 tab when at home  If going out will take 10/325 mg Max Daily Amount: 4 tablets    Other orders  -     ONE TOUCH LANCETS MISC; by Does not apply route daily  -     Glucose Blood (ONETOUCH ULTRA BLUE VI); 1 Squirt by In Vitro route daily  -     albuterol (PROAIR HFA) 90 mcg/act inhaler; Inhale 1-2 puffs every 6 (six) hours as needed  -     timolol (TIMOPTIC) 0 5 % ophthalmic solution;   -     Discontinue: timolol (TIMOPTIC) 0 25 % ophthalmic solution; Apply to eye          Subjective:      Patient ID: Michal Sacks is a 77 y o  female      Edita Moraes is here today with her son and she has officially moved down to the [facility] of her son is currently trying to establish her with a new primary care physician she is here today to have meds renewed in her move to [facility] medications have gotten lost and she has no idea what she is due for and when they're due and readily admits to being noncompliant with taking them anyway which was a long discussion in the office her son is very well aware of her noncompliance so at this point I will just send everything over and the pharmacist can decide what she is due for and what her insurance will pay for at this time we did not address any real medical issues at this visit since she will not be following up with me I did tell her it is important to establish with a cardiologist down there she did have an MI within the past year  Line she does not want to but she was strongly encouraged by me and her son to be compliant with recommendations and she basically showed she'll did decide whether or not she'll want to follow with one        The following portions of the patient's history were reviewed and updated as appropriate: past family history, past social history, past surgical history and problem list     Review of Systems      Objective:      /100 (BP Location: Left arm, Patient Position: Sitting)   Pulse 68   Temp 98 4 °F (36 9 °C) (Tympanic)   Resp 18   Ht 5' 2" (1 575 m)   Wt 90 3 kg (199 lb 2 oz)   SpO2 96%   BMI 36 42 kg/m²          Physical Exam   Constitutional: She appears well-developed  Psychiatric: She has a normal mood and affect

## 2018-07-24 DIAGNOSIS — E78.5 DYSLIPIDEMIA: ICD-10-CM

## 2018-07-24 RX ORDER — PRAVASTATIN SODIUM 40 MG
TABLET ORAL
Qty: 30 TABLET | Refills: 0 | Status: SHIPPED | OUTPATIENT
Start: 2018-07-24

## 2018-08-13 DIAGNOSIS — H40.9 GLAUCOMA OF BOTH EYES, UNSPECIFIED GLAUCOMA TYPE: ICD-10-CM

## 2018-08-14 NOTE — TELEPHONE ENCOUNTER
She is  no longer a patient here she moved to [facility] and she was supposed to get a doc there I cannot keep refilling when she has moved away

## 2018-08-29 RX ORDER — TIMOLOL MALEATE 5 MG/ML
SOLUTION/ DROPS OPHTHALMIC
Qty: 10 ML | Refills: 0 | OUTPATIENT
Start: 2018-08-29

## 2018-09-03 DIAGNOSIS — H40.9 GLAUCOMA OF BOTH EYES, UNSPECIFIED GLAUCOMA TYPE: ICD-10-CM

## 2018-09-05 DIAGNOSIS — H40.9 GLAUCOMA OF BOTH EYES, UNSPECIFIED GLAUCOMA TYPE: ICD-10-CM

## 2018-09-06 RX ORDER — BIMATOPROST 0.01 %
DROPS OPHTHALMIC (EYE)
Qty: 10 ML | Refills: 0 | Status: CANCELLED | OUTPATIENT
Start: 2018-09-06

## 2018-09-06 NOTE — TELEPHONE ENCOUNTER
She has moved away now for over 3 months I told her I cannot keep prescribing to her   She was supposed to have found a primary by now

## 2018-09-08 DIAGNOSIS — E78.5 DYSLIPIDEMIA: ICD-10-CM

## 2018-09-08 DIAGNOSIS — M79.7 FIBROMYALGIA: ICD-10-CM

## 2018-09-10 RX ORDER — PRAVASTATIN SODIUM 40 MG
TABLET ORAL
Qty: 30 TABLET | Refills: 0 | OUTPATIENT
Start: 2018-09-10

## 2018-09-10 RX ORDER — AMITRIPTYLINE HYDROCHLORIDE 25 MG/1
TABLET, FILM COATED ORAL
Qty: 30 TABLET | Refills: 0 | OUTPATIENT
Start: 2018-09-10

## 2018-09-13 DIAGNOSIS — H40.9 GLAUCOMA OF BOTH EYES, UNSPECIFIED GLAUCOMA TYPE: ICD-10-CM

## 2018-09-17 RX ORDER — TIMOLOL MALEATE 5 MG/ML
SOLUTION/ DROPS OPHTHALMIC
Qty: 10 ML | Refills: 0 | OUTPATIENT
Start: 2018-09-17

## 2018-09-22 DIAGNOSIS — M79.7 FIBROMYALGIA: ICD-10-CM

## 2018-09-22 DIAGNOSIS — H40.9 GLAUCOMA OF BOTH EYES, UNSPECIFIED GLAUCOMA TYPE: ICD-10-CM

## 2018-09-24 RX ORDER — BIMATOPROST 0.01 %
DROPS OPHTHALMIC (EYE)
Qty: 10 ML | Refills: 0 | OUTPATIENT
Start: 2018-09-24

## 2018-09-24 RX ORDER — AMITRIPTYLINE HYDROCHLORIDE 25 MG/1
TABLET, FILM COATED ORAL
Qty: 30 TABLET | Refills: 0 | OUTPATIENT
Start: 2018-09-24

## 2018-09-30 DIAGNOSIS — M79.7 FIBROMYALGIA: ICD-10-CM

## 2018-10-05 DIAGNOSIS — E11.42 TYPE 2 DIABETES MELLITUS WITH DIABETIC POLYNEUROPATHY, WITHOUT LONG-TERM CURRENT USE OF INSULIN (HCC): ICD-10-CM

## 2018-10-06 DIAGNOSIS — H40.9 GLAUCOMA OF BOTH EYES, UNSPECIFIED GLAUCOMA TYPE: ICD-10-CM

## 2018-10-08 RX ORDER — AMITRIPTYLINE HYDROCHLORIDE 25 MG/1
TABLET, FILM COATED ORAL
Qty: 30 TABLET | Refills: 0 | OUTPATIENT
Start: 2018-10-08

## 2018-10-08 RX ORDER — BIMATOPROST 0.01 %
DROPS OPHTHALMIC (EYE)
Qty: 10 ML | Refills: 0 | OUTPATIENT
Start: 2018-10-08

## 2018-11-02 DIAGNOSIS — I25.2 MI, OLD: ICD-10-CM

## 2018-11-02 RX ORDER — METOPROLOL SUCCINATE 50 MG/1
TABLET, EXTENDED RELEASE ORAL
Qty: 60 TABLET | Refills: 0 | OUTPATIENT
Start: 2018-11-02

## 2019-12-21 LAB
LEFT EYE DIABETIC RETINOPATHY: NORMAL
RIGHT EYE DIABETIC RETINOPATHY: NORMAL

## 2022-09-09 ENCOUNTER — TELEPHONE (OUTPATIENT)
Dept: FAMILY MEDICINE CLINIC | Facility: CLINIC | Age: 82
End: 2022-09-09

## 2024-09-03 ENCOUNTER — TELEPHONE (OUTPATIENT)
Dept: CARDIOLOGY CLINIC | Facility: CLINIC | Age: 84
End: 2024-09-03

## 2024-09-03 NOTE — TELEPHONE ENCOUNTER
We are in receipt of fax for cardiac clearance for patient to have EUA and hemorrhoidectomy on 09/23/24  Patient not seen since 2018.    Please assist in scheduling appointment for clearance.   Also, I will scan clearance to media for future reference.

## (undated) DEVICE — TUBING BUBBLE CLEAR 5MM X 100 FT NS

## (undated) DEVICE — 30° KELMAN®, 0.9 MM TURBOSONICS® ABS® MICROTIP™ TIP: Brand: ALCON, TURBOSONICS, ABS, MICROTIP

## (undated) DEVICE — BASIC ULTRASOUND: Brand: ALCON, INFINITI

## (undated) DEVICE — DISPOSABLE BIOPSY VALVE MAJ-1555: Brand: SINGLE USE BIOPSY VALVE (STERILE)

## (undated) DEVICE — "MB-142 MOUTHPIECE": Brand: MOUTHPIECE

## (undated) DEVICE — BRUSH ENDO CLEANING DBL-HEADER

## (undated) DEVICE — 60 ML SYRINGE,REGULAR TIP: Brand: MONOJECT

## (undated) DEVICE — EYE PACK CUSTOM -FINNEGAN

## (undated) DEVICE — SUT VICRYL 10-0 CS140-6 4 IN V960G

## (undated) DEVICE — CANNULA HYDRODISSECTION NUCLEUS 25G X 7/8IN 35DEG 8MM FROM END SINGLE-USE VISITEC

## (undated) DEVICE — LUBRICANT SURGILUBE TUBE 4 OZ  FLIP TOP

## (undated) DEVICE — MEDI-VAC YANKAUER SUCTION HANDLE: Brand: CARDINAL HEALTH

## (undated) DEVICE — "MH-438 A/W VLVE F/140 EVIS-140": Brand: AIR/WATER VALVE

## (undated) DEVICE — TRAVELKIT CONTAINS FIRST STEP KIT (200ML EP-4 KIT) AND SOILED SCOPE BAG - 1 KIT: Brand: TRAVELKIT CONTAINS FIRST STEP KIT AND SOILED SCOPE BAG

## (undated) DEVICE — THE MONARCH® "D" CARTRIDGE IS A SINGLE-USE POLYPROPYLENE CARTRIDGE FOR POSTERIOR CHAMBER IOL DELIVERY: Brand: MONARCH® III

## (undated) DEVICE — "MH-443 SUCTION VALVE F/EVIS140 EVIS160": Brand: SUCTION VALVE

## (undated) DEVICE — TRAP POLY

## (undated) DEVICE — SOLIDIFIER FLUID WASTE CONTROL 1500ML

## (undated) DEVICE — 45° KELMAN®, 0.9 MM TURBOSONICS® MINI-FLARED ABS® TIP: Brand: ALCON, KELMAN, TURBOSONICS, MINI-FLARED ABS

## (undated) DEVICE — 1200CC GUARDIAN II: Brand: GUARDIAN

## (undated) DEVICE — CLEARCUT® SLIT KNIFE 2.75MM ANGLED: Brand: CLEARCUT®

## (undated) DEVICE — GLOVE EXAM NON-STRL NTRL PLUS LRG PF

## (undated) DEVICE — TURBOSONICS MICROSMOOTH MICROTIP PARTS KIT: Brand: TURBOSONICS, MICROSMOOTH, MICROTIP, ALCON

## (undated) DEVICE — SINGLE-USE BIOPSY FORCEPS: Brand: RADIAL JAW 4

## (undated) DEVICE — BITE BLOCK MAXI 60FR LF STRAP

## (undated) DEVICE — "MAJ-901 WATER CONTAINER SET CV-160/140": Brand: WATER CONTAINER

## (undated) DEVICE — AIRLIFE™  ADULT CUSHION NASAL CANNULA WITH 7 FOOT (2.1 M) CRUSH-RESISTANT OXYGEN TUBING, AND U/CONNECT-IT ADAPTER: Brand: AIRLIFE™

## (undated) DEVICE — GLOVE SRG BIOGEL 7

## (undated) DEVICE — BITE BLOCK ENDO 60FR ADLT MAXI  DISP W/STRAP

## (undated) DEVICE — AIR INJECT CANNULA 27GA: Brand: OPHTHALMIC CANNULA

## (undated) DEVICE — TUBING AUX CHANNEL

## (undated) DEVICE — NEEDLE PERIBULBAR 25G X 7/8 IN

## (undated) DEVICE — SINGLE-USE POLYPECTOMY SNARE: Brand: SENSATION SHORT THROW

## (undated) DEVICE — 3M™ TEGADERM™ TRANSPARENT FILM DRESSING FRAME STYLE, 1624W, 2-3/8 IN X 2-3/4 IN (6 CM X 7 CM), 100/CT 4CT/CASE: Brand: 3M™ TEGADERM™

## (undated) DEVICE — B-H IRRIGATING CAN 19GA FLAT ANGLED 8MM: Brand: OPHTHALMIC CANNULA

## (undated) DEVICE — GAUZE SPONGES,16 PLY: Brand: CURITY

## (undated) DEVICE — EYE PADS 1 5/8"X2 5/8": Brand: MCKESSON